# Patient Record
Sex: MALE | Race: WHITE | Employment: OTHER | URBAN - METROPOLITAN AREA
[De-identification: names, ages, dates, MRNs, and addresses within clinical notes are randomized per-mention and may not be internally consistent; named-entity substitution may affect disease eponyms.]

---

## 2017-01-03 ENCOUNTER — OFFICE VISIT (OUTPATIENT)
Dept: ENT CLINIC | Age: 67
End: 2017-01-03

## 2017-01-03 DIAGNOSIS — Z98.890 STATUS POST NASAL SURGERY: Primary | ICD-10-CM

## 2017-01-03 PROCEDURE — 99024 POSTOP FOLLOW-UP VISIT: CPT | Performed by: OTOLARYNGOLOGY

## 2017-01-06 ENCOUNTER — OFFICE VISIT (OUTPATIENT)
Dept: ENT CLINIC | Age: 67
End: 2017-01-06

## 2017-01-06 DIAGNOSIS — Z98.890 STATUS POST NASAL SURGERY: Primary | ICD-10-CM

## 2017-01-06 PROCEDURE — 99024 POSTOP FOLLOW-UP VISIT: CPT | Performed by: OTOLARYNGOLOGY

## 2017-01-10 ENCOUNTER — OFFICE VISIT (OUTPATIENT)
Dept: ENT CLINIC | Age: 67
End: 2017-01-10

## 2017-01-10 DIAGNOSIS — Z98.890 STATUS POST NASAL SURGERY: Primary | ICD-10-CM

## 2017-01-10 PROCEDURE — 99024 POSTOP FOLLOW-UP VISIT: CPT | Performed by: OTOLARYNGOLOGY

## 2017-01-27 ENCOUNTER — OFFICE VISIT (OUTPATIENT)
Dept: ENT CLINIC | Age: 67
End: 2017-01-27

## 2017-01-27 VITALS
HEIGHT: 76 IN | TEMPERATURE: 98.1 F | SYSTOLIC BLOOD PRESSURE: 104 MMHG | BODY MASS INDEX: 28.98 KG/M2 | WEIGHT: 238 LBS | DIASTOLIC BLOOD PRESSURE: 62 MMHG

## 2017-01-27 DIAGNOSIS — Z98.890 STATUS POST NASAL SURGERY: Primary | ICD-10-CM

## 2017-01-27 PROCEDURE — 99024 POSTOP FOLLOW-UP VISIT: CPT | Performed by: OTOLARYNGOLOGY

## 2017-06-16 ENCOUNTER — INITIAL CONSULT (OUTPATIENT)
Dept: SURGERY | Age: 67
End: 2017-06-16

## 2017-06-16 VITALS
TEMPERATURE: 97.4 F | DIASTOLIC BLOOD PRESSURE: 62 MMHG | WEIGHT: 248 LBS | RESPIRATION RATE: 20 BRPM | SYSTOLIC BLOOD PRESSURE: 107 MMHG | OXYGEN SATURATION: 99 % | BODY MASS INDEX: 30.2 KG/M2 | HEART RATE: 86 BPM | HEIGHT: 76 IN

## 2017-06-16 DIAGNOSIS — I87.303 CHRONIC VENOUS HYPERTENSION, BILATERAL: Primary | ICD-10-CM

## 2017-06-16 DIAGNOSIS — I87.323 CHRONIC VENOUS HYPERTENSION WITH INFLAMMATION INVOLVING BOTH SIDES: ICD-10-CM

## 2017-06-16 DIAGNOSIS — E11.8 TYPE 2 DIABETES MELLITUS WITH COMPLICATION, UNSPECIFIED LONG TERM INSULIN USE STATUS: ICD-10-CM

## 2017-06-16 DIAGNOSIS — E78.5 HYPERLIPIDEMIA, UNSPECIFIED HYPERLIPIDEMIA TYPE: ICD-10-CM

## 2017-06-16 DIAGNOSIS — I70.213 ATHEROSCLEROSIS OF NATIVE ARTERY OF BOTH LOWER EXTREMITIES WITH INTERMITTENT CLAUDICATION (HCC): ICD-10-CM

## 2017-06-16 DIAGNOSIS — I10 ESSENTIAL HYPERTENSION: ICD-10-CM

## 2017-06-16 DIAGNOSIS — I70.219 ATHEROSCLEROTIC PVD WITH INTERMITTENT CLAUDICATION (HCC): ICD-10-CM

## 2017-06-16 DIAGNOSIS — I87.2 VENOUS INSUFFICIENCY (CHRONIC) (PERIPHERAL): ICD-10-CM

## 2017-06-16 PROCEDURE — G8427 DOCREV CUR MEDS BY ELIG CLIN: HCPCS | Performed by: SURGERY

## 2017-06-16 PROCEDURE — G8417 CALC BMI ABV UP PARAM F/U: HCPCS | Performed by: SURGERY

## 2017-06-16 PROCEDURE — 99204 OFFICE O/P NEW MOD 45 MIN: CPT | Performed by: SURGERY

## 2017-06-16 PROCEDURE — 4040F PNEUMOC VAC/ADMIN/RCVD: CPT | Performed by: SURGERY

## 2017-06-16 PROCEDURE — 3017F COLORECTAL CA SCREEN DOC REV: CPT | Performed by: SURGERY

## 2017-06-16 PROCEDURE — G8598 ASA/ANTIPLAT THER USED: HCPCS | Performed by: SURGERY

## 2017-06-16 PROCEDURE — 1036F TOBACCO NON-USER: CPT | Performed by: SURGERY

## 2017-06-16 ASSESSMENT — ENCOUNTER SYMPTOMS
RESPIRATORY NEGATIVE: 1
GASTROINTESTINAL NEGATIVE: 1
ALLERGIC/IMMUNOLOGIC NEGATIVE: 1

## 2017-06-30 ENCOUNTER — PROCEDURE VISIT (OUTPATIENT)
Dept: SURGERY | Age: 67
End: 2017-06-30

## 2017-06-30 ENCOUNTER — OFFICE VISIT (OUTPATIENT)
Dept: SURGERY | Age: 67
End: 2017-06-30

## 2017-06-30 VITALS
HEIGHT: 76 IN | WEIGHT: 247 LBS | DIASTOLIC BLOOD PRESSURE: 51 MMHG | BODY MASS INDEX: 30.08 KG/M2 | HEART RATE: 67 BPM | SYSTOLIC BLOOD PRESSURE: 115 MMHG

## 2017-06-30 DIAGNOSIS — I70.213 ATHEROSCLEROSIS OF NATIVE ARTERY OF BOTH LOWER EXTREMITIES WITH INTERMITTENT CLAUDICATION (HCC): ICD-10-CM

## 2017-06-30 DIAGNOSIS — E11.8 TYPE 2 DIABETES MELLITUS WITH COMPLICATION, UNSPECIFIED LONG TERM INSULIN USE STATUS: ICD-10-CM

## 2017-06-30 DIAGNOSIS — I87.2 VENOUS INSUFFICIENCY (CHRONIC) (PERIPHERAL): ICD-10-CM

## 2017-06-30 DIAGNOSIS — I70.219 ATHEROSCLEROTIC PVD WITH INTERMITTENT CLAUDICATION (HCC): ICD-10-CM

## 2017-06-30 DIAGNOSIS — E78.5 HYPERLIPIDEMIA, UNSPECIFIED HYPERLIPIDEMIA TYPE: ICD-10-CM

## 2017-06-30 DIAGNOSIS — I87.323 CHRONIC VENOUS HYPERTENSION WITH INFLAMMATION INVOLVING BOTH SIDES: Primary | ICD-10-CM

## 2017-06-30 DIAGNOSIS — I87.323 CHRONIC VENOUS HYPERTENSION WITH INFLAMMATION INVOLVING BOTH SIDES: ICD-10-CM

## 2017-06-30 DIAGNOSIS — I87.303 CHRONIC VENOUS HYPERTENSION, BILATERAL: ICD-10-CM

## 2017-06-30 DIAGNOSIS — I10 ESSENTIAL HYPERTENSION: ICD-10-CM

## 2017-06-30 PROCEDURE — 99213 OFFICE O/P EST LOW 20 MIN: CPT | Performed by: SURGERY

## 2017-06-30 PROCEDURE — 1036F TOBACCO NON-USER: CPT | Performed by: SURGERY

## 2017-06-30 PROCEDURE — 1123F ACP DISCUSS/DSCN MKR DOCD: CPT | Performed by: SURGERY

## 2017-06-30 PROCEDURE — 93970 EXTREMITY STUDY: CPT | Performed by: SURGERY

## 2017-06-30 PROCEDURE — 4040F PNEUMOC VAC/ADMIN/RCVD: CPT | Performed by: SURGERY

## 2017-06-30 PROCEDURE — G8417 CALC BMI ABV UP PARAM F/U: HCPCS | Performed by: SURGERY

## 2017-06-30 PROCEDURE — 3017F COLORECTAL CA SCREEN DOC REV: CPT | Performed by: SURGERY

## 2017-06-30 PROCEDURE — 3046F HEMOGLOBIN A1C LEVEL >9.0%: CPT | Performed by: SURGERY

## 2017-06-30 PROCEDURE — G8427 DOCREV CUR MEDS BY ELIG CLIN: HCPCS | Performed by: SURGERY

## 2017-06-30 PROCEDURE — G8598 ASA/ANTIPLAT THER USED: HCPCS | Performed by: SURGERY

## 2017-06-30 PROCEDURE — 93925 LOWER EXTREMITY STUDY: CPT | Performed by: SURGERY

## 2017-06-30 ASSESSMENT — ENCOUNTER SYMPTOMS
RESPIRATORY NEGATIVE: 1
ALLERGIC/IMMUNOLOGIC NEGATIVE: 1
GASTROINTESTINAL NEGATIVE: 1

## 2018-07-23 ENCOUNTER — HOSPITAL ENCOUNTER (INPATIENT)
Age: 68
LOS: 1 days | Discharge: HOME HEALTH | DRG: 065 | End: 2018-07-25
Attending: EMERGENCY MEDICINE | Admitting: INTERNAL MEDICINE
Payer: MEDICARE

## 2018-07-23 ENCOUNTER — APPOINTMENT (OUTPATIENT)
Dept: CT IMAGING | Age: 68
DRG: 065 | End: 2018-07-23
Payer: MEDICARE

## 2018-07-23 DIAGNOSIS — R29.898 WEAKNESS OF HAND: ICD-10-CM

## 2018-07-23 DIAGNOSIS — I63.9 ACUTE CVA (CEREBROVASCULAR ACCIDENT) (HCC): ICD-10-CM

## 2018-07-23 DIAGNOSIS — R47.1 DYSARTHRIA: Primary | ICD-10-CM

## 2018-07-23 PROBLEM — G45.9 TIA (TRANSIENT ISCHEMIC ATTACK): Status: ACTIVE | Noted: 2018-07-23

## 2018-07-23 PROBLEM — I65.21 STENOSIS OF RIGHT CAROTID ARTERY: Status: ACTIVE | Noted: 2018-07-23

## 2018-07-23 LAB
BASE EXCESS VENOUS: 7 (ref -3–3)
BASOPHILS ABSOLUTE: 0 K/UL (ref 0–0.2)
BASOPHILS RELATIVE PERCENT: 0.6 %
CALCIUM IONIZED: 1.21 MMOL/L (ref 1.12–1.32)
CO2: 35 MMOL/L (ref 21–32)
EOSINOPHILS ABSOLUTE: 0.3 K/UL (ref 0–0.6)
EOSINOPHILS RELATIVE PERCENT: 3.7 %
GFR AFRICAN AMERICAN: >60
GFR NON-AFRICAN AMERICAN: >60
GLUCOSE BLD-MCNC: 188 MG/DL (ref 70–99)
GLUCOSE BLD-MCNC: 194 MG/DL (ref 70–99)
HCO3 VENOUS: 32.2 MMOL/L (ref 23–29)
HCT VFR BLD CALC: 41.9 % (ref 40.5–52.5)
HEMOGLOBIN: 14.2 G/DL (ref 13.5–17.5)
LACTATE: 1.25 MMOL/L (ref 0.4–2)
LYMPHOCYTES ABSOLUTE: 1.6 K/UL (ref 1–5.1)
LYMPHOCYTES RELATIVE PERCENT: 19.4 %
MCH RBC QN AUTO: 30.2 PG (ref 26–34)
MCHC RBC AUTO-ENTMCNC: 33.9 G/DL (ref 31–36)
MCV RBC AUTO: 89.2 FL (ref 80–100)
MONOCYTES ABSOLUTE: 0.7 K/UL (ref 0–1.3)
MONOCYTES RELATIVE PERCENT: 9.2 %
NEUTROPHILS ABSOLUTE: 5.4 K/UL (ref 1.7–7.7)
NEUTROPHILS RELATIVE PERCENT: 67.1 %
O2 SAT, VEN: 54 %
PCO2, VEN: 53.3 MM HG (ref 40–50)
PDW BLD-RTO: 13.4 % (ref 12.4–15.4)
PERFORMED ON: ABNORMAL
PERFORMED ON: NORMAL
PH VENOUS: 7.39 (ref 7.35–7.45)
PLATELET # BLD: 198 K/UL (ref 135–450)
PMV BLD AUTO: 8 FL (ref 5–10.5)
PO2, VEN: 29 MM HG
POC ANION GAP: 6 (ref 10–20)
POC BUN: 19 MG/DL (ref 7–18)
POC CHLORIDE: 100 MMOL/L (ref 99–110)
POC CREATININE: 1.1 MG/DL (ref 0.8–1.3)
POC POTASSIUM: 3.8 MMOL/L (ref 3.5–5.1)
POC SAMPLE TYPE: ABNORMAL
POC SAMPLE TYPE: ABNORMAL
POC SAMPLE TYPE: NORMAL
POC SODIUM: 141 MMOL/L (ref 136–145)
POC TROPONIN I: 0 NG/ML (ref 0–0.1)
RBC # BLD: 4.7 M/UL (ref 4.2–5.9)
TCO2 CALC VENOUS: 34 MMOL/L
WBC # BLD: 8 K/UL (ref 4–11)

## 2018-07-23 PROCEDURE — G0378 HOSPITAL OBSERVATION PER HR: HCPCS

## 2018-07-23 PROCEDURE — 83605 ASSAY OF LACTIC ACID: CPT

## 2018-07-23 PROCEDURE — 70450 CT HEAD/BRAIN W/O DYE: CPT

## 2018-07-23 PROCEDURE — 2580000003 HC RX 258: Performed by: INTERNAL MEDICINE

## 2018-07-23 PROCEDURE — 85025 COMPLETE CBC W/AUTO DIFF WBC: CPT

## 2018-07-23 PROCEDURE — 99285 EMERGENCY DEPT VISIT HI MDM: CPT

## 2018-07-23 PROCEDURE — 96372 THER/PROPH/DIAG INJ SC/IM: CPT

## 2018-07-23 PROCEDURE — 6360000002 HC RX W HCPCS: Performed by: INTERNAL MEDICINE

## 2018-07-23 PROCEDURE — 70498 CT ANGIOGRAPHY NECK: CPT

## 2018-07-23 PROCEDURE — 93005 ELECTROCARDIOGRAM TRACING: CPT | Performed by: EMERGENCY MEDICINE

## 2018-07-23 PROCEDURE — 84484 ASSAY OF TROPONIN QUANT: CPT

## 2018-07-23 PROCEDURE — 70496 CT ANGIOGRAPHY HEAD: CPT

## 2018-07-23 PROCEDURE — 6360000004 HC RX CONTRAST MEDICATION: Performed by: EMERGENCY MEDICINE

## 2018-07-23 PROCEDURE — 6370000000 HC RX 637 (ALT 250 FOR IP): Performed by: INTERNAL MEDICINE

## 2018-07-23 PROCEDURE — 36415 COLL VENOUS BLD VENIPUNCTURE: CPT

## 2018-07-23 PROCEDURE — 82803 BLOOD GASES ANY COMBINATION: CPT

## 2018-07-23 PROCEDURE — 80047 BASIC METABLC PNL IONIZED CA: CPT

## 2018-07-23 RX ORDER — TAMSULOSIN HYDROCHLORIDE 0.4 MG/1
0.4 CAPSULE ORAL NIGHTLY
COMMUNITY

## 2018-07-23 RX ORDER — SODIUM CHLORIDE 0.9 % (FLUSH) 0.9 %
10 SYRINGE (ML) INJECTION EVERY 12 HOURS SCHEDULED
Status: DISCONTINUED | OUTPATIENT
Start: 2018-07-23 | End: 2018-07-25 | Stop reason: HOSPADM

## 2018-07-23 RX ORDER — FLUTICASONE PROPIONATE 50 MCG
1 SPRAY, SUSPENSION (ML) NASAL DAILY
COMMUNITY

## 2018-07-23 RX ORDER — AMLODIPINE BESYLATE 5 MG/1
5 TABLET ORAL DAILY
Status: DISCONTINUED | OUTPATIENT
Start: 2018-07-24 | End: 2018-07-25 | Stop reason: HOSPADM

## 2018-07-23 RX ORDER — NAPROXEN 500 MG/1
250 TABLET ORAL 2 TIMES DAILY WITH MEALS
Status: ON HOLD | COMMUNITY
End: 2018-07-24 | Stop reason: HOSPADM

## 2018-07-23 RX ORDER — PIOGLITAZONEHYDROCHLORIDE 15 MG/1
15 TABLET ORAL DAILY
Status: DISCONTINUED | OUTPATIENT
Start: 2018-07-23 | End: 2018-07-25 | Stop reason: HOSPADM

## 2018-07-23 RX ORDER — SODIUM CHLORIDE 0.9 % (FLUSH) 0.9 %
10 SYRINGE (ML) INJECTION PRN
Status: DISCONTINUED | OUTPATIENT
Start: 2018-07-23 | End: 2018-07-25 | Stop reason: HOSPADM

## 2018-07-23 RX ORDER — LABETALOL HYDROCHLORIDE 5 MG/ML
10 INJECTION, SOLUTION INTRAVENOUS EVERY 10 MIN PRN
Status: DISCONTINUED | OUTPATIENT
Start: 2018-07-23 | End: 2018-07-25 | Stop reason: HOSPADM

## 2018-07-23 RX ORDER — BUTALBITAL, ACETAMINOPHEN AND CAFFEINE 50; 325; 40 MG/1; MG/1; MG/1
1 TABLET ORAL EVERY 4 HOURS PRN
Status: DISCONTINUED | OUTPATIENT
Start: 2018-07-23 | End: 2018-07-23

## 2018-07-23 RX ORDER — ASPIRIN 81 MG/1
81 TABLET ORAL DAILY
Status: DISCONTINUED | OUTPATIENT
Start: 2018-07-23 | End: 2018-07-25 | Stop reason: HOSPADM

## 2018-07-23 RX ORDER — HYDROCHLOROTHIAZIDE 25 MG/1
12.5 TABLET ORAL DAILY
Status: DISCONTINUED | OUTPATIENT
Start: 2018-07-24 | End: 2018-07-25 | Stop reason: HOSPADM

## 2018-07-23 RX ORDER — AZELASTINE 1 MG/ML
2 SPRAY, METERED NASAL 2 TIMES DAILY
COMMUNITY

## 2018-07-23 RX ORDER — METHOCARBAMOL 750 MG/1
750 TABLET, FILM COATED ORAL 3 TIMES DAILY
COMMUNITY
End: 2019-10-25 | Stop reason: ALTCHOICE

## 2018-07-23 RX ORDER — GUAIFENESIN 600 MG/1
1200 TABLET, EXTENDED RELEASE ORAL DAILY
COMMUNITY

## 2018-07-23 RX ORDER — LOSARTAN POTASSIUM 100 MG/1
100 TABLET ORAL DAILY
Status: DISCONTINUED | OUTPATIENT
Start: 2018-07-24 | End: 2018-07-25 | Stop reason: HOSPADM

## 2018-07-23 RX ORDER — AMLODIPINE BESYLATE 10 MG/1
10 TABLET ORAL DAILY
COMMUNITY

## 2018-07-23 RX ORDER — ALBUTEROL SULFATE 90 UG/1
1 AEROSOL, METERED RESPIRATORY (INHALATION) EVERY 4 HOURS PRN
COMMUNITY

## 2018-07-23 RX ORDER — ONDANSETRON 2 MG/ML
4 INJECTION INTRAMUSCULAR; INTRAVENOUS EVERY 6 HOURS PRN
Status: DISCONTINUED | OUTPATIENT
Start: 2018-07-23 | End: 2018-07-25 | Stop reason: HOSPADM

## 2018-07-23 RX ORDER — DIAZEPAM 2 MG/1
2 TABLET ORAL
Status: COMPLETED | OUTPATIENT
Start: 2018-07-23 | End: 2018-07-24

## 2018-07-23 RX ORDER — LORATADINE 10 MG/1
10 CAPSULE, LIQUID FILLED ORAL DAILY
COMMUNITY
End: 2019-10-25 | Stop reason: ALTCHOICE

## 2018-07-23 RX ORDER — M-VIT,TX,IRON,MINS/CALC/FOLIC 27MG-0.4MG
1 TABLET ORAL DAILY
Status: DISCONTINUED | OUTPATIENT
Start: 2018-07-23 | End: 2018-07-25 | Stop reason: HOSPADM

## 2018-07-23 RX ORDER — GLIPIZIDE 5 MG/1
5 TABLET ORAL
COMMUNITY
End: 2019-10-25 | Stop reason: ALTCHOICE

## 2018-07-23 RX ORDER — IRBESARTAN AND HYDROCHLOROTHIAZIDE 300; 12.5 MG/1; MG/1
1 TABLET, FILM COATED ORAL DAILY
Status: DISCONTINUED | OUTPATIENT
Start: 2018-07-23 | End: 2018-07-23

## 2018-07-23 RX ORDER — IRBESARTAN AND HYDROCHLOROTHIAZIDE 300; 12.5 MG/1; MG/1
1 TABLET, FILM COATED ORAL DAILY
COMMUNITY

## 2018-07-23 RX ORDER — PANTOPRAZOLE SODIUM 40 MG/1
40 TABLET, DELAYED RELEASE ORAL
Status: DISCONTINUED | OUTPATIENT
Start: 2018-07-24 | End: 2018-07-25 | Stop reason: HOSPADM

## 2018-07-23 RX ORDER — TAMSULOSIN HYDROCHLORIDE 0.4 MG/1
0.4 CAPSULE ORAL NIGHTLY
Status: DISCONTINUED | OUTPATIENT
Start: 2018-07-23 | End: 2018-07-25 | Stop reason: HOSPADM

## 2018-07-23 RX ORDER — ATORVASTATIN CALCIUM 40 MG/1
40 TABLET, FILM COATED ORAL NIGHTLY
Status: DISCONTINUED | OUTPATIENT
Start: 2018-07-23 | End: 2018-07-24

## 2018-07-23 RX ADMIN — ATORVASTATIN CALCIUM 40 MG: 40 TABLET, FILM COATED ORAL at 22:06

## 2018-07-23 RX ADMIN — TAMSULOSIN HYDROCHLORIDE 0.4 MG: 0.4 CAPSULE ORAL at 22:06

## 2018-07-23 RX ADMIN — IOPAMIDOL 80 ML: 755 INJECTION, SOLUTION INTRAVENOUS at 15:08

## 2018-07-23 RX ADMIN — MULTIPLE VITAMINS W/ MINERALS TAB 1 TABLET: TAB at 22:20

## 2018-07-23 RX ADMIN — ASPIRIN 81 MG: 81 TABLET, COATED ORAL at 22:21

## 2018-07-23 RX ADMIN — ENOXAPARIN SODIUM 40 MG: 40 INJECTION SUBCUTANEOUS at 22:06

## 2018-07-23 RX ADMIN — Medication 10 ML: at 22:21

## 2018-07-23 NOTE — ED TRIAGE NOTES
Pt reports that he has had intermittent headaches and left side weakness and slurred speech worsening over the last 3 days. Pt states that he has not felt like he was at his baseline for 10 days. Pt is alert and oriented. Skin warm and dry. Pt placed in room and RN notified of patient.

## 2018-07-23 NOTE — ED PROVIDER NOTES
file       DISPOSITION Admitted 07/23/2018 05:11:48 PM       Severo Sours, MD  Resident  07/23/18 0712

## 2018-07-24 ENCOUNTER — APPOINTMENT (OUTPATIENT)
Dept: MRI IMAGING | Age: 68
DRG: 065 | End: 2018-07-24
Payer: MEDICARE

## 2018-07-24 LAB
CHOLESTEROL, TOTAL: 139 MG/DL (ref 0–199)
GLUCOSE BLD-MCNC: 123 MG/DL (ref 70–99)
GLUCOSE BLD-MCNC: 154 MG/DL (ref 70–99)
GLUCOSE BLD-MCNC: 177 MG/DL (ref 70–99)
GLUCOSE BLD-MCNC: 200 MG/DL (ref 70–99)
GLUCOSE BLD-MCNC: 210 MG/DL (ref 70–99)
HDLC SERPL-MCNC: 27 MG/DL (ref 40–60)
LDL CHOLESTEROL CALCULATED: 65 MG/DL
LV EF: 55 %
LVEF MODALITY: NORMAL
PERFORMED ON: ABNORMAL
TRIGL SERPL-MCNC: 237 MG/DL (ref 0–150)
VLDLC SERPL CALC-MCNC: 47 MG/DL

## 2018-07-24 PROCEDURE — 36415 COLL VENOUS BLD VENIPUNCTURE: CPT

## 2018-07-24 PROCEDURE — 6370000000 HC RX 637 (ALT 250 FOR IP): Performed by: INTERNAL MEDICINE

## 2018-07-24 PROCEDURE — G8978 MOBILITY CURRENT STATUS: HCPCS

## 2018-07-24 PROCEDURE — G8987 SELF CARE CURRENT STATUS: HCPCS

## 2018-07-24 PROCEDURE — 80061 LIPID PANEL: CPT

## 2018-07-24 PROCEDURE — 6370000000 HC RX 637 (ALT 250 FOR IP): Performed by: NURSE PRACTITIONER

## 2018-07-24 PROCEDURE — 97535 SELF CARE MNGMENT TRAINING: CPT

## 2018-07-24 PROCEDURE — G8997 SWALLOW GOAL STATUS: HCPCS

## 2018-07-24 PROCEDURE — 97166 OT EVAL MOD COMPLEX 45 MIN: CPT

## 2018-07-24 PROCEDURE — 97530 THERAPEUTIC ACTIVITIES: CPT

## 2018-07-24 PROCEDURE — 6360000004 HC RX CONTRAST MEDICATION: Performed by: INTERNAL MEDICINE

## 2018-07-24 PROCEDURE — 70551 MRI BRAIN STEM W/O DYE: CPT

## 2018-07-24 PROCEDURE — G8979 MOBILITY GOAL STATUS: HCPCS

## 2018-07-24 PROCEDURE — C8929 TTE W OR WO FOL WCON,DOPPLER: HCPCS

## 2018-07-24 PROCEDURE — G8996 SWALLOW CURRENT STATUS: HCPCS

## 2018-07-24 PROCEDURE — 96372 THER/PROPH/DIAG INJ SC/IM: CPT

## 2018-07-24 PROCEDURE — 97162 PT EVAL MOD COMPLEX 30 MIN: CPT

## 2018-07-24 PROCEDURE — 97116 GAIT TRAINING THERAPY: CPT

## 2018-07-24 PROCEDURE — 2580000003 HC RX 258: Performed by: INTERNAL MEDICINE

## 2018-07-24 PROCEDURE — 92526 ORAL FUNCTION THERAPY: CPT

## 2018-07-24 PROCEDURE — G8988 SELF CARE GOAL STATUS: HCPCS

## 2018-07-24 PROCEDURE — 6360000002 HC RX W HCPCS: Performed by: INTERNAL MEDICINE

## 2018-07-24 PROCEDURE — G0378 HOSPITAL OBSERVATION PER HR: HCPCS

## 2018-07-24 PROCEDURE — 96374 THER/PROPH/DIAG INJ IV PUSH: CPT

## 2018-07-24 PROCEDURE — 92610 EVALUATE SWALLOWING FUNCTION: CPT

## 2018-07-24 RX ORDER — DIAZEPAM 5 MG/ML
5 INJECTION, SOLUTION INTRAMUSCULAR; INTRAVENOUS
Status: DISCONTINUED | OUTPATIENT
Start: 2018-07-24 | End: 2018-07-24 | Stop reason: SDUPTHER

## 2018-07-24 RX ORDER — DEXTROSE MONOHYDRATE 25 G/50ML
12.5 INJECTION, SOLUTION INTRAVENOUS PRN
Status: DISCONTINUED | OUTPATIENT
Start: 2018-07-24 | End: 2018-07-25 | Stop reason: HOSPADM

## 2018-07-24 RX ORDER — LORAZEPAM 2 MG/ML
2 INJECTION INTRAMUSCULAR
Status: DISCONTINUED | OUTPATIENT
Start: 2018-07-24 | End: 2018-07-24

## 2018-07-24 RX ORDER — NICOTINE POLACRILEX 4 MG
15 LOZENGE BUCCAL PRN
Status: DISCONTINUED | OUTPATIENT
Start: 2018-07-24 | End: 2018-07-25 | Stop reason: HOSPADM

## 2018-07-24 RX ORDER — DIAZEPAM 5 MG/1
5 TABLET ORAL
Status: COMPLETED | OUTPATIENT
Start: 2018-07-24 | End: 2018-07-24

## 2018-07-24 RX ORDER — ATORVASTATIN CALCIUM 80 MG/1
80 TABLET, FILM COATED ORAL NIGHTLY
Status: DISCONTINUED | OUTPATIENT
Start: 2018-07-24 | End: 2018-07-25 | Stop reason: HOSPADM

## 2018-07-24 RX ORDER — DEXTROSE MONOHYDRATE 50 MG/ML
100 INJECTION, SOLUTION INTRAVENOUS PRN
Status: DISCONTINUED | OUTPATIENT
Start: 2018-07-24 | End: 2018-07-25 | Stop reason: HOSPADM

## 2018-07-24 RX ORDER — ATORVASTATIN CALCIUM 80 MG/1
80 TABLET, FILM COATED ORAL NIGHTLY
Qty: 30 TABLET | Refills: 3 | Status: SHIPPED | OUTPATIENT
Start: 2018-07-24

## 2018-07-24 RX ORDER — DIAZEPAM 5 MG/ML
5 INJECTION, SOLUTION INTRAMUSCULAR; INTRAVENOUS
Status: DISCONTINUED | OUTPATIENT
Start: 2018-07-24 | End: 2018-07-24

## 2018-07-24 RX ORDER — LORAZEPAM 2 MG/ML
1 INJECTION INTRAMUSCULAR
Status: COMPLETED | OUTPATIENT
Start: 2018-07-24 | End: 2018-07-24

## 2018-07-24 RX ADMIN — INSULIN LISPRO 1 UNITS: 100 INJECTION, SOLUTION INTRAVENOUS; SUBCUTANEOUS at 00:52

## 2018-07-24 RX ADMIN — PIOGLITAZONE 15 MG: 15 TABLET ORAL at 09:57

## 2018-07-24 RX ADMIN — PANTOPRAZOLE SODIUM 40 MG: 40 TABLET, DELAYED RELEASE ORAL at 05:11

## 2018-07-24 RX ADMIN — DIAZEPAM 5 MG: 5 TABLET ORAL at 18:12

## 2018-07-24 RX ADMIN — Medication 10 ML: at 22:24

## 2018-07-24 RX ADMIN — LORAZEPAM 1 MG: 2 INJECTION INTRAMUSCULAR; INTRAVENOUS at 18:19

## 2018-07-24 RX ADMIN — INSULIN LISPRO 2 UNITS: 100 INJECTION, SOLUTION INTRAVENOUS; SUBCUTANEOUS at 18:14

## 2018-07-24 RX ADMIN — DIAZEPAM 2 MG: 2 TABLET ORAL at 07:13

## 2018-07-24 RX ADMIN — AMLODIPINE BESYLATE 5 MG: 5 TABLET ORAL at 09:51

## 2018-07-24 RX ADMIN — INSULIN LISPRO 2 UNITS: 100 INJECTION, SOLUTION INTRAVENOUS; SUBCUTANEOUS at 15:10

## 2018-07-24 RX ADMIN — ENOXAPARIN SODIUM 40 MG: 40 INJECTION SUBCUTANEOUS at 09:50

## 2018-07-24 RX ADMIN — HYDROCHLOROTHIAZIDE 12.5 MG: 25 TABLET ORAL at 09:50

## 2018-07-24 RX ADMIN — TAMSULOSIN HYDROCHLORIDE 0.4 MG: 0.4 CAPSULE ORAL at 22:23

## 2018-07-24 RX ADMIN — INSULIN LISPRO 1 UNITS: 100 INJECTION, SOLUTION INTRAVENOUS; SUBCUTANEOUS at 22:26

## 2018-07-24 RX ADMIN — ASPIRIN 81 MG: 81 TABLET, COATED ORAL at 09:51

## 2018-07-24 RX ADMIN — Medication 10 ML: at 09:53

## 2018-07-24 RX ADMIN — LOSARTAN POTASSIUM 100 MG: 100 TABLET ORAL at 09:50

## 2018-07-24 RX ADMIN — ATORVASTATIN CALCIUM 80 MG: 80 TABLET, FILM COATED ORAL at 22:23

## 2018-07-24 RX ADMIN — PERFLUTREN 2.2 MG: 6.52 INJECTION, SUSPENSION INTRAVENOUS at 14:50

## 2018-07-24 RX ADMIN — MULTIPLE VITAMINS W/ MINERALS TAB 1 TABLET: TAB at 09:51

## 2018-07-24 ASSESSMENT — PAIN SCALES - GENERAL
PAINLEVEL_OUTOF10: 0

## 2018-07-24 NOTE — PROGRESS NOTES
Staff from MRI called and stated pt is not relaxed enough to take MRI. Hospitalist on call was notified via [employer]. Awaiting response.

## 2018-07-24 NOTE — PROGRESS NOTES
Caregiver Present: Yes (wife and sister in law)  Diagnosis: TIA vs CVA  Subjective  Subjective: pt in bed, agreeable to eval. States he has been having difficulty speaking, R UE/LE weakness has been improving. states he had blurred vision but it has since resolved  Pain Assessment  Patient Currently in Pain: No    Social/Functional History  Social/Functional History  Lives With: Spouse  Type of Home: House  Home Layout: Two level, Laundry in basement, Able to Live on Main level with bedroom/bathroom  Home Access: Stairs to enter without rails (3 )  Bathroom Shower/Tub: Tub/Shower unit  Bathroom Toilet: Standard  Home Equipment:  (able to borrow cane)  ADL Assistance: Independent  Homemaking Assistance: Independent (pt does laundry; wife cooks; shares cleaning with wife)  Ambulation Assistance: Independent  Transfer Assistance: Independent  Active : Yes  Occupation: Retired  Type of occupation: payroll  Leisure & Hobbies: computer, golf  IADL Comments: babysits his 4 grandchildren daily (youngest is 6 months old)  Additional Comments: just prior to admission pt reports 6 near falls due to new R sided weakness, dizziness, veers to right       Objective   Vision: Impaired  Vision Exceptions: Wears glasses for reading  Hearing: Within functional limits    Orientation  Overall Orientation Status: Within Normal Limits     Balance  Sitting Balance: Independent  Standing Balance: Contact guard assistance (-sba)  Standing Balance  Time: 5 min  Activity: bathroom mobility, toileting, grooming  Sit to stand: Stand by assistance  Stand to sit: Stand by assistance  Functional Mobility  Functional - Mobility Device: No device  Activity: To/from bathroom  Assist Level: Stand by assistance  Functional Mobility Comments: had one LOB when approaching sink for ADLs which pt able to self correct with cga; noted decreased proprioception R LE    Toilet Transfers  Toilet - Technique: Ambulating  Equipment Used: Standard toilet (using grab bar)  Toilet Transfer: Stand by assistance (decreased eccentric control R LE during stand>Sit using grab bar for support)  Toilet Transfers Comments: has counter support by toilet at home    ADL  Grooming: Modified independent  (brush teeth, wash hands with increased time to open/manipulate ADL containers with R UE)  UE Dressing: Independent (change gown)  LE Dressing: Stand by assistance (don shorts, socks (sba for balance))  Toileting: Supervision (stance at commode to urinate, clothing mgmt)  Tone RUE  RUE Tone: Normotonic  Tone LUE  LUE Tone: Normotonic    Coordination  Movements Are Fluid And Coordinated: No  Coordination and Movement description: Fine motor impairments;Gross motor impairments;Right UE  Quality of Movement Other  Comment: impaired finger to nose testing, thumb to digit opposition R UE compared to L; decreased R UE proprioception     Bed mobility  Supine to Sit: Independent  Transfers  Sit to stand: Stand by assistance  Stand to sit: Stand by assistance     Vision - Basic Assessment  Visual History: No significant visual history  Patient Visual Report: No visual complaint reported. (able to read sign across room without difficulty)  Oculo Motor Control: WNL  Cognition  Overall Cognitive Status: WNL        Sensation  Overall Sensation Status: Impaired (neuropathy both feet; WNL BUEs)        LUE AROM (degrees)  LUE AROM : WNL  RUE AROM (degrees)  RUE AROM : WNL  LUE Strength  Gross LUE Strength: WNL (5/5)  L Hand Grasp: 5/5  RUE Strength  Gross RUE Strength: WFL (4/5)  R Hand Grasp: 4/5     Hand Dominance  Hand Dominance: Left         Treatment consisted of:  pt education on safe transfers, fall precautions, activity promotion, modified ADLs to increase indpendence/safety  Educated pt and wife on safe tub transfers with wife supervision, DME recommendations.  Discussed functional activities to address coordination and strength in R UE    Assessment   Performance deficits / Impairments: Decreased functional mobility ; Decreased ADL status; Decreased balance;Decreased coordination;Decreased fine motor control;Decreased strength  Assessment: pt presents with mild deficits in strength and coordination in R UE/LE requiring cga-sba for dynamic standing balance during ADLS and mobility and increased time to complete ADLs 2/2 R UE deficits. Pt's wife able to provide 24hr assist at home. pt would benefit from ongoing OT tx on outpatient basis to further address his deficits  Treatment Diagnosis: decreased ADLs, functional mobility, R sided motor control and strength  Prognosis: Good  Decision Making: Medium Complexity  Patient Education: OT role, d/c rec, functional use of R UE, activity promotion, DME rec- verb understanding  REQUIRES OT FOLLOW UP: Yes  Activity Tolerance  Activity Tolerance: Patient Tolerated treatment well  Safety Devices  Safety Devices in place: Yes (recommend assist of 1 and gait belt for safe pt mobility)  Type of devices: Nurse notified; Left in chair;Call light within reach; Chair alarm in place (wife present)         Plan   Plan  Times per week: 5-7x  Times per day: Daily  Current Treatment Recommendations: Strengthening, Balance Training, Functional Mobility Training, Safety Education & Training, Self-Care / ADL, Neuromuscular Re-education    G-Code  OT G-codes  Functional Limitation: Self care  Self Care Current Status (): At least 20 percent but less than 40 percent impaired, limited or restricted  Self Care Goal Status ():  At least 1 percent but less than 20 percent impaired, limited or restricted  OutComes Score                                           AM-PAC Score        AM-[facility] Inpatient Daily Activity Raw Score: 21  AM-PAC Inpatient ADL T-Scale Score : 44.27  ADL Inpatient CMS 0-100% Score: 32.79  ADL Inpatient CMS G-Code Modifier : CJ    Goals  Short term goals  Time Frame for Short term goals: by discharge  Short term goal 1: supervision standing balance during

## 2018-07-24 NOTE — PLAN OF CARE
Problem: Nutrition  Intervention: Swallowing evaluation  Intervention: Speech Evaluation/treatment  SLP completed evaluation. Please refer to notes in EMR.

## 2018-07-24 NOTE — PROGRESS NOTES
respiratory effort. Clear to auscultation, bilaterally without Rales/Wheezes/Rhonchi. Cardiovascular: Regular rate and rhythm with normal S1/S2 without murmurs, rubs or gallops. Abdomen: Soft, non-tender, non-distended with normal bowel sounds. Musculoskeletal: Notable for subtle weakness in the right arm and right leg. Minimal pronator drift, right greater than left. Mild right-sided facial droop, barely noticeable. Skin: Skin color, texture, turgor normal.  No rashes or lesions. Neurologic:  Neurovascularly intact without any focal sensory/motor deficits. Cranial nerves: II-XII intact, grossly non-focal.  Psychiatric: Alert and oriented, thought content appropriate, normal insight  Capillary Refill: Brisk,< 3 seconds   Peripheral Pulses: +2 palpable, equal bilaterally       Labs:   Recent Labs      07/23/18   1421   WBC  8.0   HGB  14.2   HCT  41.9   PLT  198     Recent Labs      07/23/18   1405   CO2  35*   CREATININE  1.1     No results for input(s): AST, ALT, BILIDIR, BILITOT, ALKPHOS in the last 72 hours. No results for input(s): INR in the last 72 hours. Recent Labs      07/23/18   1403   TROPONINI  0.00       Urinalysis:    No results found for: Delmon Samra, BACTERIA, RBCUA, BLOODU, SPECGRAV, GLUCOSEU    Radiology:  CTA HEAD W CONTRAST   Final Result      CTA NECK W CONTRAST   Final Result      CT HEAD WO CONTRAST   Final Result      MRI BRAIN WO CONTRAST    (Results Pending)           Assessment/Plan:    Active Hospital Problems    Diagnosis    Acute CVA (cerebrovascular accident) ([facility] [address] [I63.9]     Priority: High    Type 2 diabetes mellitus ([facility] [address] [E11.9]     Priority: High    Hyperlipidemia [E78.5]     Priority: High    Stenosis of right carotid artery [I65.21]    Hypertension [I10]        Continue aspirin, statin. Continue neuro checks. Echo Results reviewed and discussed with patient. MRI brain pending. We will attempt to increase Valium dose.   Appreciate neurology

## 2018-07-24 NOTE — PROGRESS NOTES
Pt will negotiate 3 steps with CGA  Patient Goals   Patient goals : return home       Therapy Time   Individual Concurrent Group Co-treatment   Time In 4899         Time Out 1243         Minutes 40                 Timed Code Treatment Minutes:  25    Total Treatment Minutes:  40    If patient is discharged prior to next treatment, this note will serve as the discharge summary.   Jean-Claude Dangelo, PT, DPT  [phone]

## 2018-07-24 NOTE — CONSULTS
and mild (less than 30% diameter) stenosis on the left. Assessment: 70year old man with PMH HTN, HLD, Type II who presented with right arm and leg weakness for three days. Plan:  -Obtain MRI of the brain wo contrast to eval for stroke  -CTA head and neck with some mod-severe ICA stenosis on the right  -Echocardiogram with bubble  -Nursing bedside swallow before PO   -ASA 81mg PO daily  -Atorvastatin 80mg PO QHS  -SCDs for DVT prophylaxis  -PT/OT: eval and treat, PMR consult if rehab appropriate   -ST: eval and treat and dysphagia screen  -Allow BP to autoregulate for first 24 hours after stroke and treat BP >220/110 with labetalol   -Q4H neuro checks  -Q4H vital signs  -Check lipid panel and hemoglobin A1C  -Telemetry     tPA given: No, out of time window  DVT prophylaxis: SQ lovenox   Dysphagia Screening: Ordered  PT/OT Rehab: Ordered  Smoking Cessation Counseling: N/A  Stroke Education: Yes  Antithrombotic by end of day 2: 81mg ASA PO daily   Atherosclerotic Stroke or TIA: LDL goal < 70mg/dl or 50% reduction in LDL from baseline.   Symptomatic atherosclerotic cardiovascular disease and ? 73yo: high-intensity statin 80mg atorvastatin PO nightly       RONDA Hou-CNP  Neurology  [phone]

## 2018-07-24 NOTE — H&P
smokeless tobacco.  ETOH:   reports that he drinks alcohol. Family History:      Reviewed in detail and Positive as follows:    Family History   Problem Relation Age of Onset    Heart Disease Father     High Blood Pressure Father     Depression Mother     Diabetes Mother        REVIEW OF SYSTEMS:   10 point ROS obtained. Pertinent positives as noted in the HPI. All other systems reviewed and negative. PHYSICAL EXAM:    BP (!) 177/73   Pulse 69   Temp 98.6 °F (37 °C) (Oral)   Resp 18   Ht 6' 4\" (1.93 m)   Wt 243 lb 13.3 oz (110.6 kg)   SpO2 95%   BMI 29.68 kg/m²       General appearance:  No apparent distress, appears stated age and cooperative. HEENT:  Normal cephalic, atraumatic without obvious deformity. Pupils equal, round, and reactive to light. Extra ocular muscles intact. Conjunctivae/corneas clear. Neck: Supple, with full range of motion. No jugular venous distention. Trachea midline. Respiratory:  Normal respiratory effort. Clear to auscultation, bilaterally without Rales/Wheezes/Rhonchi. Cardiovascular:  Regular rate and rhythm with normal S1/S2 without murmurs, rubs or gallops. Abdomen: Soft, non-tender, non-distended with normal bowel sounds. Musculoskeletal:  No clubbing, cyanosis or edema bilaterally. Full range of motion without deformity. Skin: Skin color, texture, turgor normal.  No rashes or lesions. Neurologic:  Notable for very subtle weakness in the right arm and right leg. Minimal pronator drift, right greater than left. Mild right-sided facial droop, barely noticeable.   Psychiatric:  Alert and oriented, thought content appropriate, normal insight  Capillary Refill: Brisk,< 3 seconds   Peripheral Pulses: +2 palpable, equal bilaterally       Labs:     Recent Labs      07/23/18   1421   WBC  8.0   HGB  14.2   HCT  41.9   PLT  198     Recent Labs      07/23/18   1405   CO2  35*   CREATININE  1.1     No results for input(s): AST, ALT, BILIDIR, BILITOT, ALKPHOS in the

## 2018-07-24 NOTE — PROGRESS NOTES
reach.       Doris Qiu, [place], [facility]- [address] [facility]  Pg # [phone]  This document will serve as a discharge summary if pt discharge before next treatment   session

## 2018-07-24 NOTE — PLAN OF CARE
Problem: Neurological  Intervention: OT Evaluation/treatment  Pt will increase independence with functional transfers and ADLs.

## 2018-07-25 ENCOUNTER — APPOINTMENT (OUTPATIENT)
Dept: GENERAL RADIOLOGY | Age: 68
DRG: 065 | End: 2018-07-25
Payer: MEDICARE

## 2018-07-25 VITALS
OXYGEN SATURATION: 96 % | TEMPERATURE: 98.5 F | BODY MASS INDEX: 29.69 KG/M2 | DIASTOLIC BLOOD PRESSURE: 77 MMHG | HEART RATE: 71 BPM | RESPIRATION RATE: 18 BRPM | WEIGHT: 243.83 LBS | SYSTOLIC BLOOD PRESSURE: 153 MMHG | HEIGHT: 76 IN

## 2018-07-25 PROBLEM — I63.9 STROKE OF UNKNOWN ETIOLOGY (HCC): Status: ACTIVE | Noted: 2018-07-25

## 2018-07-25 LAB
GLUCOSE BLD-MCNC: 131 MG/DL (ref 70–99)
GLUCOSE BLD-MCNC: 176 MG/DL (ref 70–99)
PERFORMED ON: ABNORMAL
PERFORMED ON: ABNORMAL

## 2018-07-25 PROCEDURE — 6370000000 HC RX 637 (ALT 250 FOR IP): Performed by: INTERNAL MEDICINE

## 2018-07-25 PROCEDURE — 97116 GAIT TRAINING THERAPY: CPT

## 2018-07-25 PROCEDURE — 97535 SELF CARE MNGMENT TRAINING: CPT

## 2018-07-25 PROCEDURE — 97530 THERAPEUTIC ACTIVITIES: CPT

## 2018-07-25 PROCEDURE — 92611 MOTION FLUOROSCOPY/SWALLOW: CPT

## 2018-07-25 PROCEDURE — 92526 ORAL FUNCTION THERAPY: CPT

## 2018-07-25 PROCEDURE — 97110 THERAPEUTIC EXERCISES: CPT

## 2018-07-25 PROCEDURE — 96372 THER/PROPH/DIAG INJ SC/IM: CPT

## 2018-07-25 PROCEDURE — 2580000003 HC RX 258: Performed by: INTERNAL MEDICINE

## 2018-07-25 PROCEDURE — 74230 X-RAY XM SWLNG FUNCJ C+: CPT

## 2018-07-25 PROCEDURE — G0378 HOSPITAL OBSERVATION PER HR: HCPCS

## 2018-07-25 PROCEDURE — G8997 SWALLOW GOAL STATUS: HCPCS

## 2018-07-25 PROCEDURE — 6360000002 HC RX W HCPCS: Performed by: INTERNAL MEDICINE

## 2018-07-25 PROCEDURE — G8998 SWALLOW D/C STATUS: HCPCS

## 2018-07-25 PROCEDURE — G8996 SWALLOW CURRENT STATUS: HCPCS

## 2018-07-25 PROCEDURE — 1200000000 HC SEMI PRIVATE

## 2018-07-25 RX ADMIN — ENOXAPARIN SODIUM 40 MG: 40 INJECTION SUBCUTANEOUS at 08:59

## 2018-07-25 RX ADMIN — INSULIN LISPRO 1 UNITS: 100 INJECTION, SOLUTION INTRAVENOUS; SUBCUTANEOUS at 13:23

## 2018-07-25 RX ADMIN — PANTOPRAZOLE SODIUM 40 MG: 40 TABLET, DELAYED RELEASE ORAL at 05:59

## 2018-07-25 RX ADMIN — LOSARTAN POTASSIUM 100 MG: 100 TABLET ORAL at 09:00

## 2018-07-25 RX ADMIN — PIOGLITAZONE 15 MG: 15 TABLET ORAL at 08:59

## 2018-07-25 RX ADMIN — AMLODIPINE BESYLATE 5 MG: 5 TABLET ORAL at 09:04

## 2018-07-25 RX ADMIN — Medication 10 ML: at 09:00

## 2018-07-25 RX ADMIN — MULTIPLE VITAMINS W/ MINERALS TAB 1 TABLET: TAB at 09:00

## 2018-07-25 RX ADMIN — ASPIRIN 81 MG: 81 TABLET, COATED ORAL at 09:00

## 2018-07-25 RX ADMIN — HYDROCHLOROTHIAZIDE 12.5 MG: 25 TABLET ORAL at 08:59

## 2018-07-25 ASSESSMENT — PAIN SCALES - GENERAL
PAINLEVEL_OUTOF10: 0

## 2018-07-25 NOTE — PROGRESS NOTES
Spoke with therapist who recommends regular diet with thin liquids. Dr. Regla Bermudez was notified via [employer].

## 2018-07-25 NOTE — PROCEDURES
INSTRUMENTAL SWALLOW REPORT  MODIFIED BARIUM SWALLOW    NAME: August Mason   : 1950  MRN: [id number]       Date of Eval: 2018     Ordering Physician: Dr. Joseph Waldron  Radiologist: Dr. Dino Dao     Referring Diagnosis(es): Referring Diagnosis: CVA    Past Medical History:  has a past medical history of Asthma; BPH; Cataract; Depression; GERD (gastroesophageal reflux disease); Hyperlipidemia; Hypertension; and Type 2 diabetes mellitus ([facility] [address] Past Surgical History:  has a past surgical history that includes Lumbar disc surgery; Nasal septum surgery; Spine surgery (); knee surgery (); eye surgery; Colonoscopy; and other surgical history (N/A, 2016). Current Diet Solid Consistency: Regular  Current Diet Liquid Consistency: Thin    Type of Study: Initial MBS     MRI Brain (18)  1. Small patchy acute ischemic insults in the left ventral melba. No associated hemorrhage. 2. Tiny remote lacunar infarct in the left corona radiata. 3. Mild chronic small vessel ischemic white matter disease.        CT of head 18  1.  No discrete findings for acute intracranial abnormality if   there is concern for stroke, follow-up study or further evaluation   with MRI imaging recommended.       2.  Subtle low-attenuation likely reflecting remote ischemic   change with small remote lacunar infarct in the inferior left   basal ganglia region.       2.  Mild bilateral ethmoid and sphenoid sinus disease as   described. Recent CXR/CT of Chest: none    Patient Complaints/Reason for Referral:  August Mason was referred for a MBS to assess the efficiency of his/her swallow function, assess for aspiration, and to make recommendations regarding safe dietary consistencies, effective compensatory strategies, and safe eating environment.   Patient complaints: throat clearing with PO and without PO    Onset of problem: 18    Behavior/Cognition/Vision/Hearing:  Behavior/Cognition: Alert; Cooperative;Pleasant mood  Vision: Impaired  Vision Exceptions: Wears glasses for reading  Hearing: Within functional limits    Impressions:   Pt demonstrates adequate oral stage of swallow. Pt demonstrates transient penetration with thin liquids (via cup and via straw) that clears with the swallow. He has no pharyngeal or laryngeal residue. There is no penetration with nectar consistency liquids. Pt deemed appropriate for regular diet with thin liquids with adherence to General Aspiration Precautions (Upright position for all PO, Eat slowly, Small bites/sips). Treatment Dx and ICD 10: oropharyngeal dysphagia (R13.12)  Patient Position: Lateral and Patient Degrees: 90    Consistencies Administered: Thin cup; Thin straw;Nectar cup;Puree; Soft solid    Compensatory Swallowing Strategies Attempted: Upright as possible for all oral intake;Small bites/sips;Eat/Feed slowly  Oral: WFL  Pharyngeal: Transient penetration with thin liquids via cup and via straw, clears completely with the swallow. Dysphagia Outcome Severity Scale: Level 6: Within functional limits/Modified independence  Penetration-Aspiration Scale (PAS): 2 - Material enters the airway, remains above the vocal folds, and is ejected from the airway    Recommended Diet:  Solid consistency: Regular  Liquid consistency: Thin  Medication administration: PO    Safe Swallow Protocol:  Upright as possible for all oral intake  Small bites/sips  Eat/Feed slowly    Recommendations/Treatment  Requires SLP Intervention: Yes  D/C Recommendations: Outpatient tx is indicated    Recommended Exercises:   Therapeutic Interventions: Patient/Family education    Referral To: Speech Evaluation    Education: Images and recommendations were reviewed with yudithtient following this exam.   Patient Education: SLP educated pt re: MBS procedure, assessment results, and diet recommendations.   Patient Education Response: Verbalizes understanding    Prognosis  Prognosis for safe

## 2018-07-25 NOTE — PROGRESS NOTES
Occupational Therapy  Facility/Department: [place] [address] 4 PCU  Daily Treatment Note  NAME: Armando Bronson  : 1950  MRN: [id number]    Date of Service: 2018    Discharge Recommendations:  Armando Bronson scored a 21/24 on the AM-PAC ADL Inpatient form. Current research shows that an AM-PAC score of 18 or greater is typically associated with a discharge to the patient's home setting. Based on the patients AM-PAC score and their current ADL deficits, it is recommended that the patient have 2-3 sessions per week of Occupational Therapy at d/c to increase the patients independence. HOME HEALTH CARE: LEVEL 3 SAFETY     - Initial home health evaluation to occur within 24-48 hours, in patient home   - Therapy evaluations in home within 24-48 hours of discharge; including DME and home safety   - Frontload therapy 5 days, then 3x a week   - Therapy to evaluate if patient has [address] needs for personal care   -  evaluation within 24-48 hours, includes evaluation of resources and insurance to determine [place], [place], LTC, and Medicaid options        OT Equipment Recommendations  Equipment Needed: Yes  Mobility Devices: ADL Assistive Devices  ADL Assistive Devices: Shower Chair with back; Toileting - Raised Toilet Seat with arms    Patient Diagnosis(es): The primary encounter diagnosis was Dysarthria. Diagnoses of Weakness of hand and Acute CVA (cerebrovascular accident) [facility]) were also pertinent to this visit. has a past medical history of Asthma; BPH; Cataract; Depression; GERD (gastroesophageal reflux disease); Hyperlipidemia; Hypertension; and Type 2 diabetes mellitus ([facility] [address] has a past surgical history that includes Lumbar disc surgery; Nasal septum surgery; Spine surgery (); knee surgery (); eye surgery; Colonoscopy; and other surgical history (N/A, 2016).     Restrictions  Position Activity Restriction  Other position/activity restrictions: no activity restrictions technique)  Transfers  Sit to stand: Contact guard assistance  Stand to sit: Stand by assistance        Coordination  Fine Motor: provided pt with red sponge for /pinch strengthening and in hand manipulation HEP. education provided on bilateral slow AROM of B UE with focus on motor control of R UE. discussed cooridination activities for pt to complete at home           Assessment   Performance deficits / Impairments: Decreased functional mobility ; Decreased ADL status; Decreased balance;Decreased coordination;Decreased fine motor control;Decreased strength  Assessment: pt required cga for safe transfers and mobility using RW this date- limited by balance deficits and R LE weakness. pt receptive to education on HEP for R UE strengthening and DME recommendations. pt would benefit  from ongoing OT tx to maximize functional return of R UE strength/coordination. wife plans to provide 24hr assist at home, agreeable to home therapy- pt would benefit from home safety eval  Treatment Diagnosis: decreased ADLs, functional mobility, R sided motor control and strength  Prognosis: Good  Patient Education: R UE HEP, safe mobility using RW, DME rec- verb understanding  REQUIRES OT FOLLOW UP: Yes  Activity Tolerance  Activity Tolerance: Patient Tolerated treatment well  Safety Devices  Safety Devices in place: Yes (recommend RW, gait belt and assist of 1 for safe mobility- white board updated)  Type of devices: Nurse notified;Call light within reach; Bed alarm in place; Left in bed (wife present)          Plan   Plan  Times per week: 5-7x  Times per day: Daily  Current Treatment Recommendations: Strengthening, Balance Training, Functional Mobility Training, Safety Education & Training, Self-Care / ADL, Neuromuscular Re-education  G-Code     OutComes Score                                           AM-PAC Score        AM-PAC Inpatient Daily Activity Raw Score: 21  AM-PAC Inpatient ADL T-Scale Score : 44.27  ADL Inpatient CMS 0-100% Score: 32.79  ADL Inpatient CMS G-Code Modifier : CJ    Goals  Short term goals  Time Frame for Short term goals: by discharge  Short term goal 1: supervision standing balance during ADL/simple IADL task- not met  Short term goal 2: independent HEP for R UE strengthening/coordination- goal met, ongoing  Short term goal 3: SBA simulated tub transfer- not met  Patient Goals   Patient goals : return home, agreeable to home therapy       Therapy Time   Individual Concurrent Group Co-treatment   Time In 1445         Time Out 1530         Minutes 45         Timed Code Treatment Minutes:   45  Total Treatment Minutes:  39     If patient is d/c prior to next treatment session, this note will serve as the discharge summary    Tanja Ornelas, OT

## 2018-07-25 NOTE — PROGRESS NOTES
Uneventfully removed saline lock, site is unremarkable. Bandage to site. Reviewed discharge instructions with pt and his wife, who verbalized understanding and willingness to comply. Patient will have therapy and front wheeled walker is at pt's bedside. Pt is aware that prescriptions are at their outpatient pharmacy.

## 2018-07-25 NOTE — PROGRESS NOTES
Speech Language Pathology  Facility/Department: [place] [address] PCU  Dysphagia Daily Treatment Note    NAME: Morteza Kuhn  : 1950  MRN: [id number]    Patient Diagnosis(es):   Patient Active Problem List    Diagnosis Date Noted    Type 2 diabetes mellitus ([facility] 2010     Priority: High    Hyperlipidemia 2010     Priority: High    Stroke of unknown [facility] ([facility] 2018    Acute CVA (cerebrovascular accident) ([facility] 2018    Stenosis of right carotid artery 2018    Atherosclerotic PVD with intermittent claudication ([facility] 2017    Chronic venous hypertension with inflammation involving both sides 2017    Nasal obstruction     Hypertrophy of nasal turbinates     Memory difficulties 2012    Asthma 2011    Neuropathy in diabetes ([facility] 2011    Hypertension 2011    Depression 2010    Allergic rhinitis 2010    BPH (benign prostatic hyperplasia) 2010     Allergies: Allergies   Allergen Reactions    Latex Rash    Amoxicillin Nausea Only     Light headed    Amoxil [Amoxicillin Trihydrate]      rash    Adhesive Tape Rash    Cefuroxime Rash     Onset Date: 18    MRI Brain (18)  1. Small patchy acute ischemic insults in the left ventral melba. No associated hemorrhage. 2. Tiny remote lacunar infarct in the left corona radiata. 3. Mild chronic small vessel ischemic white matter disease.      CT of head 18  1.  No discrete findings for acute intracranial abnormality if   there is concern for stroke, follow-up study or further evaluation   with MRI imaging recommended.       2.  Subtle low-attenuation likely reflecting remote ischemic   change with small remote lacunar infarct in the inferior left   basal ganglia region.       2.  Mild bilateral ethmoid and sphenoid sinus disease as   described.      Pain:  Pain Assessment  Patient Currently in Pain: Denies  Pain Assessment: 0-10  Pain Level: 0    Chart reviewed. Medical Diagnosis:  TIA  Treatment Diagnosis:  Oral-pharyngeal Dysphagia (R13.12)    BSE Impression (7/24/18): Pt reports onset of difficulty with talking and walking 2-3 days ago. When asked pt if he was having any trouble swallowing, pt stated \"sometimes it doesn't feel right\". Lungs are clear per chart. Pt presents with mild dysarthria, but pt utilizing strategies to aid with intelligibility. Oral- pt with very slight right labial droop at rest. [facility]. Pharyngeal- pt with frequent throat clearing following all consistencies, which pt and wife state is his baseline. Pt able to initiate swallow in a timely manner. Voice clear after all consistencies- no coughing, only throat clearing, following all consistencies. MBS results: none    Current Diet:  Regular Texture, Thin Liquids    Treatment:  Pt seen bedside to address the following goals:  1- The patient will tolerate recommended diet without observed clinical signs of aspiration  7/25/18:  Pt demonstrates immediate and delayed throat clearing with thin liquids. Pt requires increased time and added moisture to facilitate swallow of dry cracker. (Pt also demonstrates difficulty with oral management of the eucharistic host provided by .)  Pt and wife report frequent throat clearing is typical for patient due to sinus congestion and drainage; however on this date, clinical s/s aspiration and dysphagia appear to increase in frequency and severity with PO. Recommend instrumental swallow assessment to r/o aspiration. Continue goal.    2- The pt/family will demonstrate understanding of swallowing recommendations and concerns. 7/24-  The pt and wife were educated to purpose of the visit, anatomy and physiology of the swallow, concerns for aspiration, swallowing strategies, diet recommendations and possibility of liquids being changed to nectar thick if s/s aspiration emerge. The pt stated comprehension.  con't goal  7/25/18:  SLP educated pt and wife re: role of SLP, anatomy and physiology of swallow, s/s aspiration, risks of aspiration, concern for dysphagia with a brainstem infarct, recommendation for MBS procedure. Pt and wfie verbalized comprehension. Continue goal.    In addition, pt continues to demonstrate mild dysarthria with intelligible speech at the conversation level. Recommend complete speech evaluation. Patient/Family/Caregiver Education:  see above    Compensatory Strategies:  Upright as possible for all oral intake  Small sips as a precaution      Plan:    Instrumental assessment (MBS)  Continued dysphagia treatment with goals per plan of care. Speech evaluation (to be completed at the next level of care if pt discharges today)  Diet recommendations:  TBD based on MBS  DC recommendation: pt will likely need continued Speech Therapy for speech and swallowing   Treatment: 15 minutes  D/W nursing, Dereje Dahl  Needs met prior to leaving room, call button in reach. Electronically Signed by:  Frandy Don., [id number] [facility], [place], [facility]  Speech-Language Pathologist  [place]. [address]  Pager #[phone]    If patient is discharged prior to next treatment, this note will serve as the discharge summary.

## 2018-07-25 NOTE — CARE COORDINATION
[facility]    Spoke with patient regarding [facility] services. Patient aware and agreeable to services. Faxed orders to [facility].     Rosina Chisholm LPN  Care Transition Nurse  [address]  [phone]

## 2018-07-25 NOTE — PROGRESS NOTES
Interval History:  No acute events overnight.      Current Medications:    Current Facility-Administered Medications:     insulin lispro (HUMALOG) injection pen 0-6 Units, 0-6 Units, Subcutaneous, TID WC, Lianet Keenan MD, 2 Units at 07/24/18 1814    insulin lispro (HUMALOG) injection pen 0-3 Units, 0-3 Units, Subcutaneous, Nightly, Lianet Keenan MD, 1 Units at 07/24/18 2226    glucose (GLUTOSE) 40 % oral gel 15 g, 15 g, Oral, PRN, Lianet Keenan MD    dextrose 50 % solution 12.5 g, 12.5 g, Intravenous, PRN, Lianet Keenan MD    glucagon (rDNA) injection 1 mg, 1 mg, Intramuscular, PRN, Lianet Keenan MD    dextrose 5 % solution, 100 mL/hr, Intravenous, PRN, Lianet Keenan MD    atorvastatin (LIPITOR) tablet 80 mg, 80 mg, Oral, Nightly, Rk Rodriguez, APRN - CNP, 80 mg at 07/24/18 2223    aspirin EC tablet 81 mg, 81 mg, Oral, Daily, Sumanth Murcia MD, 81 mg at 07/24/18 0951    pioglitazone (ACTOS) tablet 15 mg, 15 mg, Oral, Daily, Sumanth Murcia MD, 15 mg at 07/24/18 0957    amLODIPine (NORVASC) tablet 5 mg, 5 mg, Oral, Daily, Sumanth Murcia MD, 5 mg at 07/24/18 0951    tamsulosin (FLOMAX) capsule 0.4 mg, 0.4 mg, Oral, Nightly, Sumanth Murcia MD, 0.4 mg at 07/24/18 2223    pantoprazole (PROTONIX) tablet 40 mg, 40 mg, Oral, QAM AC, Sumanth Murcia MD, 40 mg at 07/25/18 0559    therapeutic multivitamin-minerals 1 tablet, 1 tablet, Oral, Daily, Sumanth Murcia MD, 1 tablet at 07/24/18 0951    sodium chloride flush 0.9 % injection 10 mL, 10 mL, Intravenous, 2 times per day, Sumanth Murcia MD, 10 mL at 07/24/18 2224    sodium chloride flush 0.9 % injection 10 mL, 10 mL, Intravenous, PRN, Sumanth Murcia MD    magnesium hydroxide (MILK OF MAGNESIA) 400 MG/5ML suspension 30 mL, 30 mL, Oral, Daily PRN, Sumanth Murcia MD    ondansetron (ZOFRAN) injection 4 mg, 4 mg, Intravenous, Q6H PRN, Sumanth Murcia MD    enoxaparin (LOVENOX) injection 40 mg, 40 mg, Subcutaneous, Daily, Rosita Arizmendi MD, 40 mg at 07/24/18 2880   labetalol (NORMODYNE;TRANDATE) injection 10 mg, 10 mg, Intravenous, Q10 Min PRN, Sumanth Murcia MD    losartan (COZAAR) tablet 100 mg, 100 mg, Oral, Daily, 100 mg at 07/24/18 0950 **AND** hydrochlorothiazide (HYDRODIURIL) tablet 12.5 mg, 12.5 mg, Oral, Daily, Sumanth Murcia MD, 12.5 mg at 07/24/18 0950      Physical Exam  Constitutional  BP (!) 177/83   Pulse 69   Temp 98 °F (36.7 °C) (Oral)   Resp 18   Ht 6' 4\" (1.93 m)   Wt 243 lb 13.3 oz (110.6 kg)   SpO2 95%   BMI 29.68 kg/m²     General Alert, no distress, well-nourished  Eyes: fundoscopic exam revealed no hemorrhage   Cardiovascular: Rate regular. No murmurs  Psychiatric: cooperative with examination, no  psychotic behavior noted. Neurologic  Mental status:   orientation to person and place   General fund of knowledge grossly intact   Memory grossly intact   Attention intact as able to attend well to the exam     Language fluent in conversation   Comprehension intact; follows simple commands  Cranial nerves:   CN2: Visual Fields full w/o extinction on confrontational testing,   CN 3,4,6: pupils equal and reactive to light, extraocular muscles intact,  CN5: facial sensation symmetric   CN7:face symmetric without dysarthria,   CN8: hearing symmetric to finger rub   CN9: palate elevated symmetrically  CN11: trap full strength on shoulder shrug  CN12: tongue midline with protrusion  Motor Exam:  Mild weakness in RUE, decreased fine motor and pronator drift. Hip flexion 4 on right   Motor Exam:    R  L    Deltoid 4 5   Biceps 4+ 5   Triceps 4+ 5   Interossei 5 5        R  L    Hip flexion  4  5   Knee flexion  5 5   Knee extension  5 5   Ankle dorsiflexion  5 5   Ankle plantar flexion  5 5        Sensory: light touch intact and symmetric in all 4 extremities.   No sensory extinction on double simultaneous stimulation  Cerebellar/coordination: finger nose finger normal without ataxia  Gait: normal gait, normal tandem          Images:   MRI brain:  Area of

## 2018-07-25 NOTE — PROGRESS NOTES
restrictions noted  Subjective   General  Chart Reviewed: Yes  Additional Pertinent Hx: Admit 7/23 with R side weakness and numbness and slurred speech;  CTA remarkable for severe right ICA stenosis and moderate left ICA stenosis; neuro consult; PMHx: asthma, depression, HLD, HTN, DM, knee surgery, eye surgery, spine surgery  Referring Practitioner: Renuka Jones MD  Subjective  Subjective: Pt found ambulating in clement with wife and use of RW. Slightly unsteady when turning to look back. Pt agreeable to PT session.   Pain Screening  Patient Currently in Pain: Denies       Orientation  Orientation  Overall Orientation Status: Within Normal Limits  Objective   Bed mobility  Sit to Supine: Supervision  Scooting: Supervision (cues for technique)  Transfers  Sit to Stand: Contact guard assistance  Stand to sit: Contact guard assistance (cues to keep walker close and for hand placement)  Ambulation  Ambulation?: Yes  Ambulation 1  Assistance: Contact guard assistance  Quality of Gait: increased lateral sway at times, inconsistent step length, able to clear toes on R foot ~75% of the time, multiple episodes of unsteadiness particularly with turning walker, needs cues for safety with walker during turns  Distance: 120 ft including 180 deg turn and 360 deg turn; also ambulated 10 ft x 2 with RW and CGA into bathroom with cues for use of walker  Stairs/Curb  Stairs?: Yes (negotiated 4 steps with rail (to simulate use of cane) with CGA, step to pattern when ascending and descending, R LE buckling with descending but no LOB)     Balance  Sitting - Static: Good  Sitting - Dynamic: Good  Standing - Static: Fair  Standing - Dynamic: Fair (CGA with RW)  Exercises  Comments: educated pt/wife on LE ex including APs (40-50 reps to increase endurance), LAQ and seated step taps to improve motor control of R LE                        Assessment   Body structures, Functions, Activity limitations: Decreased functional mobility   Assessment: Pt remains below his functional baseline. Requires use of RW for balance. R LE strength, endurance, and motor control deficits noted again. Rec continued PT. Would benefit from home PT initially - pt/wife aware and in agreement. Rec rolling walker. Social work notified. Treatment Diagnosis: impaired gait, decreased R LE strength and motor control  Prognosis: Good  Patient Education: role of PT, use of call light, d/c planning; pt demonstrates understanding. REQUIRES PT FOLLOW UP: Yes     G-Code     OutComes Score                                                    AM-PAC Score  AM-PAC Inpatient Mobility Raw Score : 18  AM-PAC Inpatient T-Scale Score : 43.63  Mobility Inpatient CMS 0-100% Score: 46.58  Mobility Inpatient CMS G-Code Modifier : CK          Goals  Short term goals  Time Frame for Short term goals: discharge  Short term goal 1: Pt will transfer sit <--> stand with supervision  ongoing 7/25  Short term goal 2: Pt will ambulate  150 ft with LRAD and SBA  ongoing 7/25  Short term goal 3: Pt will negotiate 3 steps with CGA  MET 7/25  Patient Goals   Patient goals : return home    Plan    Plan  Times per week: 5-7  Current Treatment Recommendations: Strengthening, Balance Training, Functional Mobility Training, Transfer Training, Endurance Training, Gait Training, Stair training, Patient/Caregiver Education & Training, Equipment Evaluation, Education, & procurement  Safety Devices  Type of devices: Call light within reach, Nurse notified, Gait belt, Bed alarm in place, Left in bed     Therapy Time   Individual Concurrent Group Co-treatment   Time In 1437         Time Out 1530         Minutes 53                 Timed Code Treatment Minutes:  53    Total Treatment Minutes:  53    If patient is discharged prior to next treatment, this note will serve as the discharge summary.   Mirela Bolivar, PT, DPT  [id number]

## 2018-07-26 LAB
EKG ATRIAL RATE: 75 BPM
EKG DIAGNOSIS: NORMAL
EKG P AXIS: 58 DEGREES
EKG P-R INTERVAL: 188 MS
EKG Q-T INTERVAL: 380 MS
EKG QRS DURATION: 84 MS
EKG QTC CALCULATION (BAZETT): 424 MS
EKG R AXIS: -5 DEGREES
EKG T AXIS: 53 DEGREES
EKG VENTRICULAR RATE: 75 BPM

## 2018-07-26 NOTE — DISCHARGE SUMMARY
Hospital Medicine Discharge Summary    Patient ID: Morteza Kuhn      Patient's PCP: Andrae Cerna MD    Admit Date: 7/23/2018     Discharge Date: 7/25/2018    Admitting Physician: Yarelis Vaughan MD     Discharge Physician: Yarelis Vaughan MD     Discharge Diagnoses:    Principal Problem:    Acute CVA (cerebrovascular accident) [facility])  Active Problems:    Type 2 diabetes mellitus ([facility]    Hyperlipidemia    Hypertension    Stenosis of right carotid artery    [facility]  Resolved Problems:    * No resolved hospital problems. *        The patient was seen and examined on day of discharge and this discharge summary is in conjunction with any daily progress note from day of discharge. Hospital Course:     Morteza Kuhn is a 70 y.o. male who was admitted for acute onset of right sided arm and leg weakness, with slurred speech and mild facial droop. He presented out of window for tPA. Initial CT head was within normal limits. CTA demonstrated R ICA > 50% stenosis, L ICA less than 50% stenosis. MRI brain was positive for small patchy acute ischemic insults in L ventral melba, and tiny remote lacunar infarct. Pt was seen by neurology, started on high intensity statin and daily low dose aspirin, he did well on PT, OT and speech evaluations and was discharged home with home health in stable condition          Exam:     BP (!) 153/77   Pulse 71   Temp 98.5 °F (36.9 °C) (Oral)   Resp 18   Ht 6' 4\" (1.93 m)   Wt 243 lb 13.3 oz (110.6 kg)   SpO2 96%   BMI 29.68 kg/m²       General appearance:  No apparent distress, appears stated age and cooperative. HEENT:  Normal cephalic, atraumatic without obvious deformity. Pupils equal, round, and reactive to light. Extra ocular muscles intact. Conjunctivae/corneas clear. Neck: Supple, with full range of motion. No jugular venous distention. Trachea midline. Respiratory:  Normal respiratory effort.  Clear to auscultation, bilaterally without IV  3-D reconstruction    HISTORY: Same    Thin section scans from skull base to cranial vertex with IV contrast. 3-D reconstruction at separate workstation with maximum intensity projection    Imaging through the brain shows no enhancing mass. Ventricles midline and normal in size. Mastoid air cell complexes and paranasal sinuses clear    Calcific plaques are located at the supraclinoid segment of each internal carotid artery, associated with mild (less than 30% diameter) stenosis bilaterally. Anterior, middle and posterior cerebral arteries show no large branch occlusion or high-grade   stenosis. With raw data axial images, both distal vertebral arteries and basilar artery are patent. 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 No aneurysm greater than 4 mm. IMPRESSION:    Calcific plaques at the supraclinoid segment of each distal internal carotid artery, each associated with mild (less than 30% diameter) stenosis. Consults:     IP CONSULT TO HOSPITALIST  IP CONSULT TO NEUROLOGY  IP CONSULT TO SOCIAL WORK  IP CONSULT TO HOME CARE NEEDS  IP CONSULT TO HOME CARE NEEDS    Disposition:  home     Condition at Discharge: Stable    Discharge Instructions/Follow-up:      Discuss with your primary care physician and neurologist about need for carotid endarterectomy      Stephanie Jordan MD  [facility] [place] [address]  [phone]    In 1 week      Gold Graf MD  [address] [address]   [facility]. [place]   [phone]    In 2 weeks      [place] [address]            Code Status:  Prior     Activity: activity as tolerated    Diet: cardiac diet      Discharge Medications:     Discharge Medication List as of 7/25/2018 10:40 AM           Details   atorvastatin (LIPITOR) 80 MG tablet Take 1 tablet by mouth nightly, Disp-30 tablet, R-3Normal              Details   metFORMIN

## 2018-07-29 ENCOUNTER — HOSPITAL ENCOUNTER (INPATIENT)
Age: 68
LOS: 1 days | Discharge: NURSING, REHAB OR LONG-TERM CARE | DRG: 057 | End: 2018-07-30
Attending: EMERGENCY MEDICINE | Admitting: INTERNAL MEDICINE
Payer: MEDICARE

## 2018-07-29 ENCOUNTER — APPOINTMENT (OUTPATIENT)
Dept: CT IMAGING | Age: 68
DRG: 057 | End: 2018-07-29
Payer: MEDICARE

## 2018-07-29 ENCOUNTER — APPOINTMENT (OUTPATIENT)
Dept: GENERAL RADIOLOGY | Age: 68
DRG: 057 | End: 2018-07-29
Payer: MEDICARE

## 2018-07-29 DIAGNOSIS — R53.1 RIGHT SIDED WEAKNESS: Primary | ICD-10-CM

## 2018-07-29 PROBLEM — G81.91 RIGHT HEMIPLEGIA (HCC): Status: ACTIVE | Noted: 2018-07-29

## 2018-07-29 PROBLEM — I69.993 CVA, OLD, ATAXIA: Status: ACTIVE | Noted: 2018-07-29

## 2018-07-29 PROBLEM — R53.81 DEBILITY: Status: ACTIVE | Noted: 2018-07-29

## 2018-07-29 LAB
ANION GAP SERPL CALCULATED.3IONS-SCNC: 10 MMOL/L (ref 3–16)
BASOPHILS ABSOLUTE: 0 K/UL (ref 0–0.2)
BASOPHILS RELATIVE PERCENT: 0.5 %
BILIRUBIN URINE: NEGATIVE MG/DL
BLOOD, URINE: NEGATIVE
BUN BLDV-MCNC: 24 MG/DL (ref 7–20)
CALCIUM SERPL-MCNC: 9.8 MG/DL (ref 8.3–10.6)
CHLORIDE BLD-SCNC: 102 MMOL/L (ref 99–110)
CLARITY: ABNORMAL
CO2: 30 MMOL/L (ref 21–32)
COLOR: ABNORMAL
CREAT SERPL-MCNC: 0.9 MG/DL (ref 0.8–1.3)
EOSINOPHILS ABSOLUTE: 0.3 K/UL (ref 0–0.6)
EOSINOPHILS RELATIVE PERCENT: 4 %
EPITHELIAL CELLS, UA: NORMAL /HPF
GFR AFRICAN AMERICAN: >60
GFR NON-AFRICAN AMERICAN: >60
GLUCOSE BLD-MCNC: 157 MG/DL (ref 70–99)
GLUCOSE URINE: NEGATIVE MG/DL
HCT VFR BLD CALC: 40.6 % (ref 40.5–52.5)
HEMOGLOBIN: 13.8 G/DL (ref 13.5–17.5)
KETONES, URINE: NEGATIVE MG/DL
LEUKOCYTE ESTERASE, URINE: NEGATIVE
LYMPHOCYTES ABSOLUTE: 1.4 K/UL (ref 1–5.1)
LYMPHOCYTES RELATIVE PERCENT: 19 %
MCH RBC QN AUTO: 30.3 PG (ref 26–34)
MCHC RBC AUTO-ENTMCNC: 34 G/DL (ref 31–36)
MCV RBC AUTO: 89.3 FL (ref 80–100)
MICROSCOPIC EXAMINATION: YES
MONOCYTES ABSOLUTE: 0.6 K/UL (ref 0–1.3)
MONOCYTES RELATIVE PERCENT: 8.1 %
NEUTROPHILS ABSOLUTE: 5.2 K/UL (ref 1.7–7.7)
NEUTROPHILS RELATIVE PERCENT: 68.4 %
NITRITE, URINE: NEGATIVE
PDW BLD-RTO: 13.5 % (ref 12.4–15.4)
PH UA: 5.5
PLATELET # BLD: 176 K/UL (ref 135–450)
PMV BLD AUTO: 7.7 FL (ref 5–10.5)
POTASSIUM SERPL-SCNC: 4.3 MMOL/L (ref 3.5–5.1)
PROTEIN UA: ABNORMAL MG/DL
RBC # BLD: 4.55 M/UL (ref 4.2–5.9)
RBC UA: NORMAL /HPF (ref 0–2)
SODIUM BLD-SCNC: 142 MMOL/L (ref 136–145)
SPECIFIC GRAVITY UA: 1.02
TROPONIN: <0.01 NG/ML
UROBILINOGEN, URINE: 0.2 E.U./DL
WBC # BLD: 7.6 K/UL (ref 4–11)
WBC UA: NORMAL /HPF (ref 0–5)

## 2018-07-29 PROCEDURE — 81001 URINALYSIS AUTO W/SCOPE: CPT

## 2018-07-29 PROCEDURE — 71046 X-RAY EXAM CHEST 2 VIEWS: CPT

## 2018-07-29 PROCEDURE — 93005 ELECTROCARDIOGRAM TRACING: CPT | Performed by: PHYSICIAN ASSISTANT

## 2018-07-29 PROCEDURE — 80048 BASIC METABOLIC PNL TOTAL CA: CPT

## 2018-07-29 PROCEDURE — 84484 ASSAY OF TROPONIN QUANT: CPT

## 2018-07-29 PROCEDURE — 96360 HYDRATION IV INFUSION INIT: CPT

## 2018-07-29 PROCEDURE — 6370000000 HC RX 637 (ALT 250 FOR IP): Performed by: INTERNAL MEDICINE

## 2018-07-29 PROCEDURE — 99285 EMERGENCY DEPT VISIT HI MDM: CPT

## 2018-07-29 PROCEDURE — 1200000000 HC SEMI PRIVATE

## 2018-07-29 PROCEDURE — 6360000002 HC RX W HCPCS: Performed by: INTERNAL MEDICINE

## 2018-07-29 PROCEDURE — 2580000003 HC RX 258: Performed by: PHYSICIAN ASSISTANT

## 2018-07-29 PROCEDURE — 2580000003 HC RX 258: Performed by: INTERNAL MEDICINE

## 2018-07-29 PROCEDURE — 94760 N-INVAS EAR/PLS OXIMETRY 1: CPT

## 2018-07-29 PROCEDURE — 85025 COMPLETE CBC W/AUTO DIFF WBC: CPT

## 2018-07-29 PROCEDURE — 94664 DEMO&/EVAL PT USE INHALER: CPT

## 2018-07-29 PROCEDURE — 70450 CT HEAD/BRAIN W/O DYE: CPT

## 2018-07-29 RX ORDER — HYDROCHLOROTHIAZIDE 25 MG/1
12.5 TABLET ORAL DAILY
Status: DISCONTINUED | OUTPATIENT
Start: 2018-07-30 | End: 2018-07-30 | Stop reason: HOSPADM

## 2018-07-29 RX ORDER — FLUTICASONE PROPIONATE 50 MCG
1 SPRAY, SUSPENSION (ML) NASAL DAILY
Status: DISCONTINUED | OUTPATIENT
Start: 2018-07-29 | End: 2018-07-30 | Stop reason: HOSPADM

## 2018-07-29 RX ORDER — ATORVASTATIN CALCIUM 80 MG/1
80 TABLET, FILM COATED ORAL NIGHTLY
Status: DISCONTINUED | OUTPATIENT
Start: 2018-07-29 | End: 2018-07-30 | Stop reason: HOSPADM

## 2018-07-29 RX ORDER — LOSARTAN POTASSIUM 100 MG/1
100 TABLET ORAL DAILY
Status: DISCONTINUED | OUTPATIENT
Start: 2018-07-30 | End: 2018-07-30 | Stop reason: HOSPADM

## 2018-07-29 RX ORDER — SODIUM CHLORIDE 0.9 % (FLUSH) 0.9 %
10 SYRINGE (ML) INJECTION EVERY 12 HOURS SCHEDULED
Status: DISCONTINUED | OUTPATIENT
Start: 2018-07-29 | End: 2018-07-30 | Stop reason: HOSPADM

## 2018-07-29 RX ORDER — ONDANSETRON 2 MG/ML
4 INJECTION INTRAMUSCULAR; INTRAVENOUS EVERY 6 HOURS PRN
Status: DISCONTINUED | OUTPATIENT
Start: 2018-07-29 | End: 2018-07-30 | Stop reason: HOSPADM

## 2018-07-29 RX ORDER — GLIPIZIDE 5 MG/1
5 TABLET ORAL
Status: DISCONTINUED | OUTPATIENT
Start: 2018-07-30 | End: 2018-07-30 | Stop reason: HOSPADM

## 2018-07-29 RX ORDER — ALBUTEROL SULFATE 90 UG/1
1 AEROSOL, METERED RESPIRATORY (INHALATION) EVERY 4 HOURS PRN
Status: DISCONTINUED | OUTPATIENT
Start: 2018-07-29 | End: 2018-07-30 | Stop reason: HOSPADM

## 2018-07-29 RX ORDER — 0.9 % SODIUM CHLORIDE 0.9 %
1000 INTRAVENOUS SOLUTION INTRAVENOUS ONCE
Status: COMPLETED | OUTPATIENT
Start: 2018-07-29 | End: 2018-07-29

## 2018-07-29 RX ORDER — METFORMIN HYDROCHLORIDE 500 MG/1
2000 TABLET, EXTENDED RELEASE ORAL
Status: DISCONTINUED | OUTPATIENT
Start: 2018-07-29 | End: 2018-07-30 | Stop reason: HOSPADM

## 2018-07-29 RX ORDER — AZELASTINE 1 MG/ML
2 SPRAY, METERED NASAL 2 TIMES DAILY
Status: DISCONTINUED | OUTPATIENT
Start: 2018-07-29 | End: 2018-07-30 | Stop reason: HOSPADM

## 2018-07-29 RX ORDER — AMLODIPINE BESYLATE 10 MG/1
10 TABLET ORAL DAILY
Status: DISCONTINUED | OUTPATIENT
Start: 2018-07-29 | End: 2018-07-30 | Stop reason: HOSPADM

## 2018-07-29 RX ORDER — GUAIFENESIN 600 MG/1
1200 TABLET, EXTENDED RELEASE ORAL DAILY
Status: DISCONTINUED | OUTPATIENT
Start: 2018-07-29 | End: 2018-07-30 | Stop reason: HOSPADM

## 2018-07-29 RX ORDER — TAMSULOSIN HYDROCHLORIDE 0.4 MG/1
0.4 CAPSULE ORAL NIGHTLY
Status: DISCONTINUED | OUTPATIENT
Start: 2018-07-29 | End: 2018-07-30 | Stop reason: HOSPADM

## 2018-07-29 RX ORDER — SODIUM CHLORIDE 0.9 % (FLUSH) 0.9 %
10 SYRINGE (ML) INJECTION PRN
Status: DISCONTINUED | OUTPATIENT
Start: 2018-07-29 | End: 2018-07-30 | Stop reason: HOSPADM

## 2018-07-29 RX ORDER — METHOCARBAMOL 750 MG/1
750 TABLET, FILM COATED ORAL 3 TIMES DAILY
Status: DISCONTINUED | OUTPATIENT
Start: 2018-07-29 | End: 2018-07-30 | Stop reason: HOSPADM

## 2018-07-29 RX ORDER — ASPIRIN 81 MG/1
81 TABLET ORAL DAILY
Status: DISCONTINUED | OUTPATIENT
Start: 2018-07-29 | End: 2018-07-30 | Stop reason: HOSPADM

## 2018-07-29 RX ORDER — PIOGLITAZONEHYDROCHLORIDE 15 MG/1
15 TABLET ORAL DAILY
Status: DISCONTINUED | OUTPATIENT
Start: 2018-07-29 | End: 2018-07-30 | Stop reason: HOSPADM

## 2018-07-29 RX ORDER — IRBESARTAN AND HYDROCHLOROTHIAZIDE 300; 12.5 MG/1; MG/1
1 TABLET, FILM COATED ORAL DAILY
Status: DISCONTINUED | OUTPATIENT
Start: 2018-07-29 | End: 2018-07-29

## 2018-07-29 RX ADMIN — ENOXAPARIN SODIUM 40 MG: 40 INJECTION SUBCUTANEOUS at 19:56

## 2018-07-29 RX ADMIN — SODIUM CHLORIDE 1000 ML: 0.9 INJECTION, SOLUTION INTRAVENOUS at 12:59

## 2018-07-29 RX ADMIN — METFORMIN HYDROCHLORIDE 2000 MG: 500 TABLET, EXTENDED RELEASE ORAL at 19:56

## 2018-07-29 RX ADMIN — Medication 10 ML: at 21:25

## 2018-07-29 RX ADMIN — METHOCARBAMOL 750 MG: 750 TABLET ORAL at 21:25

## 2018-07-29 RX ADMIN — TAMSULOSIN HYDROCHLORIDE 0.4 MG: 0.4 CAPSULE ORAL at 21:25

## 2018-07-29 RX ADMIN — ATORVASTATIN CALCIUM 80 MG: 80 TABLET, FILM COATED ORAL at 21:25

## 2018-07-29 ASSESSMENT — ENCOUNTER SYMPTOMS
NAUSEA: 0
ABDOMINAL PAIN: 0
CONSTIPATION: 0
COLOR CHANGE: 0
CHEST TIGHTNESS: 0
CHOKING: 0
VOMITING: 0
COUGH: 0
DIARRHEA: 0
SHORTNESS OF BREATH: 0

## 2018-07-29 ASSESSMENT — PAIN SCALES - GENERAL
PAINLEVEL_OUTOF10: 0
PAINLEVEL_OUTOF10: 0

## 2018-07-29 NOTE — H&P
Hospital Medicine History & Physical      PCP: Susie Page MD    Date of Admission: 7/29/2018    Date of Service: Pt seen/examined on 07/29/18 5:47 PM   and Admitted to Inpatient with expected LOS greater than two midnights due to medical therapy. Chief Complaint:  Recent CVA, Increasing right sided weakness, inability to take care for himself, need placement to IP rehab. Pt was discharged from [facility] on 7/25. AS per [place] eliud  \"Sanket Montes De Oca is a 70 y.o. male who was admitted for acute onset of right sided arm and leg weakness, with slurred speech and mild facial droop. He presented out of window for tPA. Initial CT head was within normal limits. CTA demonstrated R ICA > 50% stenosis, L ICA less than 50% stenosis. MRI brain was positive for small patchy acute ischemic insults in L ventral melba, and tiny remote lacunar infarct. Pt was seen by neurology, started on high intensity statin and daily low dose aspirin, he did well on PT, OT and speech evaluations and was discharged home with home health in stable condition'. Pt presented from home because family was not able to provide what he needed. There was risk of fall and wife was not to manage him. Pt has recent CVA with right hemiplegia. Pt think that his weakness on right side is getting worse, he was able to walk with rolling walker when he was discharged last week, now he cant do so. Pt was tearful. Denies any fever, chill, headache, blurry vision. CT scan was done in ER and was negative for new change. UA was negative. CBC,BMP stable. POC troponin was negative.        Past Medical History:        Diagnosis Date    Asthma     BPH     Cataract 1999    Removed 1999 & 2000    [facility]erebral [facility] occlusion [facility]     Depression     GERD (gastroesophageal reflux disease)     Hyperlipidemia     Hypertension     Type 2 diabetes mellitus ([facility]        Past Surgical History:        Procedure Laterality daily. Yes Historical Provider, MD       Allergies:  Latex; Amoxicillin; Amoxil [amoxicillin trihydrate]; Adhesive tape; and Cefuroxime    Social History:  The patient currently lives at home with his wife. TOBACCO:   reports that he has never smoked. He has never used smokeless tobacco.  ETOH:   reports that he drinks alcohol. Family History:  Reviewed in detail and negative for DM, Early CAD, Cancer, CVA. Positive as follows:    Family History   Problem Relation Age of Onset    Heart Disease Father     High Blood Pressure Father     Depression Mother     Diabetes Mother          REVIEW OF SYSTEMS:  CONSTITUTIONAL:  Neg   Recent weight changes,fatigue,fever,chills or night sweats  EYES:  Neg  blurriness,tearing,itching or acute change in vision  EARS:  Neg   hearing loss,tinnitus,vertigo,discharge or earache. NOSE:  Neg  rhinorrhea,sneezing,itching,allergy or epistaxis  MOUTH/THROAT:  Neg  bleeding gums,hoarseness or sore throat. RESPIRATORY:   Neg SOB,wheeze,cough,sputum,hemoptysis or brnochitis  CARDIOVASCULAR   Neg : chest pain,palpitations,dyspnea on exertion,orthopnea,paroxysmal nocturnal dyspnea or edema  GASTROINTESTINAL:  Neg   Appetite changes,nausea,vomiting,or diarrhea,indigestion,dysphagia,change in bowel movements, or abdominal pain. GENITOURINARY:  Neg  Urinary frequency,hesitancy,urgency,polyuria,dysuria,hematuria,nocturia,or incontinence. HEMATOLOGIC/LYMPHATIC:  Neg  Anemia,bleeding tendency  MUSCULOSKELETAL:    Neg myalgias,bone pain,joint pain,swelling or stiffness and has had  change in gait. NEUROLOGICAL:  Neg  Loss of Consciousness,memeory loss,forgetfulness,periods of confusion,difficulty concentrating,seizures,decline in intellect,nervousness,insomina,aphasia or dysarthria. +Right sided weakness. SKIN :  Neg  skin or hair changes,and has no itching,rashes,sores. PSYCHIATRIC:  Neg depression,personality changes,anxiety.   ENDOCRINE:  Neg polydipsia,polyuria,abnormal weight or extra-axial hemorrhage    Previous infarct left anterior melba noted on MRI examination with maturation measuring 11 x 8 mm. No midline shift mass effect or edema. Impression:       Infarct left melba noted on previous MRI examination July 24, 2018. No acute hemorrhage. Recent Labs      07/29/18   1159   TROPONINI  <0.01       U/A:    Lab Results   Component Value Date    COLORU Not Entered 07/29/2018    WBCUA 0-2 07/29/2018    RBCUA 0-2 07/29/2018    CLARITYU Not Entered 07/29/2018    SPECGRAV 1.025 07/29/2018    LEUKOCYTESUR Negative 07/29/2018    BLOODU Negative 07/29/2018    GLUCOSEU Negative 07/29/2018       ABG  No results found for: FWA3ALS, BEART, O1NYZVNI, PHART, THGBART, CHI2JAE, PO2ART, OWG7LTU        Active Hospital Problems    Diagnosis Date Noted    Type 2 diabetes mellitus ([facility] [E11.9] 06/09/2010     Priority: High    Hyperlipidemia [E78.5] 06/09/2010     Priority: High    CVA, old, ataxia [I69.993] 07/29/2018    Debility [R53.81] 07/29/2018    Stenosis of right carotid artery [I65.21] 07/23/2018    Hypertension [I10] 01/21/2011    BPH (benign prostatic hyperplasia) [N40.0] 06/09/2010         ASSESSMENT/PLAN:    Principal Problem:    Right hemiplegia ([facility] Recent CVA. H/o carotid artery stenosis. MRI was done last week. CT head today show no new change. Continue ASA/STatin. Active Problems:    Type 2 diabetes mellitus (HCC)continue home regimen. Hyperlipidemia. Statin. CVA, old, ataxia. PT/OT    Debility. PT/OT consulted. Fall precaution. BPH (benign prostatic hyperplasia)Flomax. Hypertension. Continue home medication. BP is under control upon arrival.     Stenosis of right carotid artery. Will need f/u vascular surgery as OP. Fall precaution. F/u PT/OT. IP rehab placement if appropriate. DVT Prophylaxis: Lovenox  Diet: DIET CARDIAC;  Code Status: Full Code. Advance directive discussed. Pt has a living will.  His son make decision on his behalf if

## 2018-07-29 NOTE — PLAN OF CARE
Problem: Falls - Risk of:  Goal: Will remain free from falls  Will remain free from falls   Outcome: Ongoing  Patient is A&O x4, VSS. Patient is up x1 with a walker. Patient has remained free of falls. Patient is currently in bed with the bed alarm. Bed is in the lowest position and wheels are locked. Patient calls out accordingly. Call light and bed side table are within reach. Will continue to monitor and assess.

## 2018-07-29 NOTE — ED NOTES
pto ed for c/o increased right side weakness. Pt has recent Hx of cva with right side affected.      Adriana Silva RN  07/29/18 0491

## 2018-07-30 ENCOUNTER — HOSPITAL ENCOUNTER (INPATIENT)
Age: 68
LOS: 15 days | Discharge: HOME | DRG: 057 | End: 2018-08-14
Attending: PHYSICAL MEDICINE & REHABILITATION | Admitting: PHYSICAL MEDICINE & REHABILITATION
Payer: MEDICARE

## 2018-07-30 VITALS
RESPIRATION RATE: 16 BRPM | SYSTOLIC BLOOD PRESSURE: 159 MMHG | HEART RATE: 75 BPM | HEIGHT: 76 IN | TEMPERATURE: 98.7 F | OXYGEN SATURATION: 93 % | DIASTOLIC BLOOD PRESSURE: 67 MMHG

## 2018-07-30 PROBLEM — I63.9 ACUTE ISCHEMIC STROKE (HCC): Status: ACTIVE | Noted: 2018-07-30

## 2018-07-30 LAB
ANION GAP SERPL CALCULATED.3IONS-SCNC: 10 MMOL/L (ref 3–16)
BUN BLDV-MCNC: 21 MG/DL (ref 7–20)
CALCIUM SERPL-MCNC: 9.7 MG/DL (ref 8.3–10.6)
CHLORIDE BLD-SCNC: 101 MMOL/L (ref 99–110)
CO2: 27 MMOL/L (ref 21–32)
CREAT SERPL-MCNC: 0.8 MG/DL (ref 0.8–1.3)
GFR AFRICAN AMERICAN: >60
GFR NON-AFRICAN AMERICAN: >60
GLUCOSE BLD-MCNC: 133 MG/DL (ref 70–99)
GLUCOSE BLD-MCNC: 98 MG/DL (ref 70–99)
HCT VFR BLD CALC: 39.8 % (ref 40.5–52.5)
HEMOGLOBIN: 13.4 G/DL (ref 13.5–17.5)
MAGNESIUM: 1.7 MG/DL (ref 1.8–2.4)
MCH RBC QN AUTO: 30.1 PG (ref 26–34)
MCHC RBC AUTO-ENTMCNC: 33.8 G/DL (ref 31–36)
MCV RBC AUTO: 89.3 FL (ref 80–100)
PDW BLD-RTO: 13.6 % (ref 12.4–15.4)
PERFORMED ON: ABNORMAL
PLATELET # BLD: 165 K/UL (ref 135–450)
PMV BLD AUTO: 8.3 FL (ref 5–10.5)
POTASSIUM REFLEX MAGNESIUM: 3.5 MMOL/L (ref 3.5–5.1)
RBC # BLD: 4.45 M/UL (ref 4.2–5.9)
SODIUM BLD-SCNC: 138 MMOL/L (ref 136–145)
WBC # BLD: 7.8 K/UL (ref 4–11)

## 2018-07-30 PROCEDURE — 97116 GAIT TRAINING THERAPY: CPT

## 2018-07-30 PROCEDURE — G8978 MOBILITY CURRENT STATUS: HCPCS

## 2018-07-30 PROCEDURE — 97535 SELF CARE MNGMENT TRAINING: CPT

## 2018-07-30 PROCEDURE — 36592 COLLECT BLOOD FROM PICC: CPT

## 2018-07-30 PROCEDURE — 1280000000 HC REHAB R&B

## 2018-07-30 PROCEDURE — G8987 SELF CARE CURRENT STATUS: HCPCS

## 2018-07-30 PROCEDURE — 97166 OT EVAL MOD COMPLEX 45 MIN: CPT

## 2018-07-30 PROCEDURE — 6370000000 HC RX 637 (ALT 250 FOR IP): Performed by: PHYSICAL MEDICINE & REHABILITATION

## 2018-07-30 PROCEDURE — 6370000000 HC RX 637 (ALT 250 FOR IP): Performed by: INTERNAL MEDICINE

## 2018-07-30 PROCEDURE — 36591 DRAW BLOOD OFF VENOUS DEVICE: CPT

## 2018-07-30 PROCEDURE — 6360000002 HC RX W HCPCS: Performed by: INTERNAL MEDICINE

## 2018-07-30 PROCEDURE — 83735 ASSAY OF MAGNESIUM: CPT

## 2018-07-30 PROCEDURE — G8988 SELF CARE GOAL STATUS: HCPCS

## 2018-07-30 PROCEDURE — G8979 MOBILITY GOAL STATUS: HCPCS

## 2018-07-30 PROCEDURE — 97162 PT EVAL MOD COMPLEX 30 MIN: CPT

## 2018-07-30 PROCEDURE — 97530 THERAPEUTIC ACTIVITIES: CPT

## 2018-07-30 PROCEDURE — 94760 N-INVAS EAR/PLS OXIMETRY 1: CPT

## 2018-07-30 PROCEDURE — 2580000003 HC RX 258: Performed by: PHYSICAL MEDICINE & REHABILITATION

## 2018-07-30 PROCEDURE — 80048 BASIC METABOLIC PNL TOTAL CA: CPT

## 2018-07-30 PROCEDURE — 94150 VITAL CAPACITY TEST: CPT

## 2018-07-30 PROCEDURE — 2580000003 HC RX 258: Performed by: INTERNAL MEDICINE

## 2018-07-30 PROCEDURE — 85027 COMPLETE CBC AUTOMATED: CPT

## 2018-07-30 PROCEDURE — 36415 COLL VENOUS BLD VENIPUNCTURE: CPT

## 2018-07-30 RX ORDER — TRAZODONE HYDROCHLORIDE 50 MG/1
50 TABLET ORAL NIGHTLY PRN
Status: CANCELLED | OUTPATIENT
Start: 2018-07-30

## 2018-07-30 RX ORDER — DOCUSATE SODIUM 100 MG/1
100 CAPSULE, LIQUID FILLED ORAL 2 TIMES DAILY
Status: CANCELLED | OUTPATIENT
Start: 2018-07-30

## 2018-07-30 RX ORDER — ASPIRIN 81 MG/1
81 TABLET ORAL DAILY
Status: DISCONTINUED | OUTPATIENT
Start: 2018-07-31 | End: 2018-08-14 | Stop reason: HOSPADM

## 2018-07-30 RX ORDER — SODIUM CHLORIDE 0.9 % (FLUSH) 0.9 %
10 SYRINGE (ML) INJECTION EVERY 12 HOURS SCHEDULED
Status: CANCELLED | OUTPATIENT
Start: 2018-07-30

## 2018-07-30 RX ORDER — ATORVASTATIN CALCIUM 80 MG/1
80 TABLET, FILM COATED ORAL NIGHTLY
Status: DISCONTINUED | OUTPATIENT
Start: 2018-07-30 | End: 2018-08-14 | Stop reason: HOSPADM

## 2018-07-30 RX ORDER — LOSARTAN POTASSIUM 100 MG/1
100 TABLET ORAL DAILY
Status: CANCELLED | OUTPATIENT
Start: 2018-07-31

## 2018-07-30 RX ORDER — FLUTICASONE PROPIONATE 50 MCG
1 SPRAY, SUSPENSION (ML) NASAL DAILY
Status: CANCELLED | OUTPATIENT
Start: 2018-07-31

## 2018-07-30 RX ORDER — GLIPIZIDE 5 MG/1
5 TABLET ORAL
Status: CANCELLED | OUTPATIENT
Start: 2018-07-31

## 2018-07-30 RX ORDER — DOCUSATE SODIUM 100 MG/1
100 CAPSULE, LIQUID FILLED ORAL 2 TIMES DAILY
Status: DISCONTINUED | OUTPATIENT
Start: 2018-07-30 | End: 2018-07-30

## 2018-07-30 RX ORDER — SODIUM CHLORIDE 0.9 % (FLUSH) 0.9 %
10 SYRINGE (ML) INJECTION PRN
Status: DISCONTINUED | OUTPATIENT
Start: 2018-07-30 | End: 2018-08-14 | Stop reason: HOSPADM

## 2018-07-30 RX ORDER — AMLODIPINE BESYLATE 10 MG/1
10 TABLET ORAL DAILY
Status: CANCELLED | OUTPATIENT
Start: 2018-07-31

## 2018-07-30 RX ORDER — ASPIRIN 81 MG/1
81 TABLET ORAL DAILY
Status: CANCELLED | OUTPATIENT
Start: 2018-07-31

## 2018-07-30 RX ORDER — ACETAMINOPHEN 325 MG/1
650 TABLET ORAL EVERY 4 HOURS PRN
Status: CANCELLED | OUTPATIENT
Start: 2018-07-30

## 2018-07-30 RX ORDER — GLIPIZIDE 5 MG/1
5 TABLET ORAL
Status: DISCONTINUED | OUTPATIENT
Start: 2018-07-31 | End: 2018-08-14 | Stop reason: HOSPADM

## 2018-07-30 RX ORDER — PIOGLITAZONEHYDROCHLORIDE 15 MG/1
15 TABLET ORAL DAILY
Status: DISCONTINUED | OUTPATIENT
Start: 2018-07-31 | End: 2018-08-14 | Stop reason: HOSPADM

## 2018-07-30 RX ORDER — AMLODIPINE BESYLATE 10 MG/1
10 TABLET ORAL DAILY
Status: DISCONTINUED | OUTPATIENT
Start: 2018-07-31 | End: 2018-08-14 | Stop reason: HOSPADM

## 2018-07-30 RX ORDER — METFORMIN HYDROCHLORIDE 500 MG/1
2000 TABLET, EXTENDED RELEASE ORAL
Status: CANCELLED | OUTPATIENT
Start: 2018-07-30

## 2018-07-30 RX ORDER — AZELASTINE 1 MG/ML
2 SPRAY, METERED NASAL 2 TIMES DAILY
Status: CANCELLED | OUTPATIENT
Start: 2018-07-30

## 2018-07-30 RX ORDER — PIOGLITAZONEHYDROCHLORIDE 15 MG/1
15 TABLET ORAL DAILY
Status: CANCELLED | OUTPATIENT
Start: 2018-07-31

## 2018-07-30 RX ORDER — ALBUTEROL SULFATE 90 UG/1
1 AEROSOL, METERED RESPIRATORY (INHALATION) EVERY 4 HOURS PRN
Status: DISCONTINUED | OUTPATIENT
Start: 2018-07-30 | End: 2018-08-14 | Stop reason: HOSPADM

## 2018-07-30 RX ORDER — ATORVASTATIN CALCIUM 80 MG/1
80 TABLET, FILM COATED ORAL NIGHTLY
Status: CANCELLED | OUTPATIENT
Start: 2018-07-30

## 2018-07-30 RX ORDER — METHOCARBAMOL 750 MG/1
750 TABLET, FILM COATED ORAL 3 TIMES DAILY
Status: CANCELLED | OUTPATIENT
Start: 2018-07-30

## 2018-07-30 RX ORDER — GUAIFENESIN 600 MG/1
1200 TABLET, EXTENDED RELEASE ORAL DAILY
Status: CANCELLED | OUTPATIENT
Start: 2018-07-31

## 2018-07-30 RX ORDER — GUAIFENESIN 600 MG/1
1200 TABLET, EXTENDED RELEASE ORAL DAILY
Status: DISCONTINUED | OUTPATIENT
Start: 2018-07-31 | End: 2018-08-14 | Stop reason: HOSPADM

## 2018-07-30 RX ORDER — ALBUTEROL SULFATE 90 UG/1
1 AEROSOL, METERED RESPIRATORY (INHALATION) EVERY 4 HOURS PRN
Status: CANCELLED | OUTPATIENT
Start: 2018-07-30

## 2018-07-30 RX ORDER — LOSARTAN POTASSIUM 100 MG/1
100 TABLET ORAL DAILY
Status: DISCONTINUED | OUTPATIENT
Start: 2018-07-31 | End: 2018-07-31

## 2018-07-30 RX ORDER — TAMSULOSIN HYDROCHLORIDE 0.4 MG/1
0.4 CAPSULE ORAL NIGHTLY
Status: CANCELLED | OUTPATIENT
Start: 2018-07-30

## 2018-07-30 RX ORDER — HYDROCHLOROTHIAZIDE 25 MG/1
12.5 TABLET ORAL DAILY
Status: CANCELLED | OUTPATIENT
Start: 2018-07-31

## 2018-07-30 RX ORDER — ACETAMINOPHEN 325 MG/1
650 TABLET ORAL EVERY 4 HOURS PRN
Status: DISCONTINUED | OUTPATIENT
Start: 2018-07-30 | End: 2018-08-14 | Stop reason: HOSPADM

## 2018-07-30 RX ORDER — TRAZODONE HYDROCHLORIDE 50 MG/1
50 TABLET ORAL NIGHTLY PRN
Status: DISCONTINUED | OUTPATIENT
Start: 2018-07-30 | End: 2018-08-14 | Stop reason: HOSPADM

## 2018-07-30 RX ORDER — SENNA AND DOCUSATE SODIUM 50; 8.6 MG/1; MG/1
2 TABLET, FILM COATED ORAL 2 TIMES DAILY
Status: DISCONTINUED | OUTPATIENT
Start: 2018-07-30 | End: 2018-08-14 | Stop reason: HOSPADM

## 2018-07-30 RX ORDER — METHOCARBAMOL 750 MG/1
750 TABLET, FILM COATED ORAL 3 TIMES DAILY
Status: DISCONTINUED | OUTPATIENT
Start: 2018-07-30 | End: 2018-08-14 | Stop reason: HOSPADM

## 2018-07-30 RX ORDER — FLUTICASONE PROPIONATE 50 MCG
1 SPRAY, SUSPENSION (ML) NASAL DAILY
Status: DISCONTINUED | OUTPATIENT
Start: 2018-07-31 | End: 2018-08-14 | Stop reason: HOSPADM

## 2018-07-30 RX ORDER — AZELASTINE 1 MG/ML
2 SPRAY, METERED NASAL 2 TIMES DAILY
Status: DISCONTINUED | OUTPATIENT
Start: 2018-07-30 | End: 2018-08-14 | Stop reason: HOSPADM

## 2018-07-30 RX ORDER — TAMSULOSIN HYDROCHLORIDE 0.4 MG/1
0.4 CAPSULE ORAL NIGHTLY
Status: DISCONTINUED | OUTPATIENT
Start: 2018-07-30 | End: 2018-08-14 | Stop reason: HOSPADM

## 2018-07-30 RX ORDER — ONDANSETRON 4 MG/1
8 TABLET, ORALLY DISINTEGRATING ORAL EVERY 8 HOURS PRN
Status: CANCELLED | OUTPATIENT
Start: 2018-07-30

## 2018-07-30 RX ORDER — SODIUM CHLORIDE 0.9 % (FLUSH) 0.9 %
10 SYRINGE (ML) INJECTION PRN
Status: CANCELLED | OUTPATIENT
Start: 2018-07-30

## 2018-07-30 RX ORDER — SODIUM CHLORIDE 0.9 % (FLUSH) 0.9 %
10 SYRINGE (ML) INJECTION EVERY 12 HOURS SCHEDULED
Status: DISCONTINUED | OUTPATIENT
Start: 2018-07-30 | End: 2018-07-31

## 2018-07-30 RX ORDER — HYDROCHLOROTHIAZIDE 25 MG/1
12.5 TABLET ORAL DAILY
Status: DISCONTINUED | OUTPATIENT
Start: 2018-07-31 | End: 2018-07-31

## 2018-07-30 RX ORDER — ONDANSETRON 8 MG/1
8 TABLET, ORALLY DISINTEGRATING ORAL EVERY 8 HOURS PRN
Status: DISCONTINUED | OUTPATIENT
Start: 2018-07-30 | End: 2018-08-14 | Stop reason: HOSPADM

## 2018-07-30 RX ADMIN — LOSARTAN POTASSIUM 100 MG: 100 TABLET ORAL at 09:11

## 2018-07-30 RX ADMIN — Medication 10 ML: at 22:47

## 2018-07-30 RX ADMIN — METHOCARBAMOL 750 MG: 750 TABLET ORAL at 22:41

## 2018-07-30 RX ADMIN — PIOGLITAZONE 15 MG: 15 TABLET ORAL at 09:09

## 2018-07-30 RX ADMIN — TAMSULOSIN HYDROCHLORIDE 0.4 MG: 0.4 CAPSULE ORAL at 22:41

## 2018-07-30 RX ADMIN — GLIPIZIDE 5 MG: 5 TABLET ORAL at 09:10

## 2018-07-30 RX ADMIN — AMLODIPINE BESYLATE 10 MG: 10 TABLET ORAL at 09:10

## 2018-07-30 RX ADMIN — METHOCARBAMOL 750 MG: 750 TABLET ORAL at 09:10

## 2018-07-30 RX ADMIN — GUAIFENESIN 1200 MG: 600 TABLET, EXTENDED RELEASE ORAL at 09:11

## 2018-07-30 RX ADMIN — METFORMIN HYDROCHLORIDE 2000 MG: 500 TABLET, EXTENDED RELEASE ORAL at 22:42

## 2018-07-30 RX ADMIN — ATORVASTATIN CALCIUM 80 MG: 80 TABLET, FILM COATED ORAL at 22:41

## 2018-07-30 RX ADMIN — HYDROCHLOROTHIAZIDE 12.5 MG: 25 TABLET ORAL at 09:09

## 2018-07-30 RX ADMIN — ASPIRIN 81 MG: 81 TABLET, COATED ORAL at 09:10

## 2018-07-30 RX ADMIN — FLUTICASONE PROPIONATE 1 SPRAY: 50 SPRAY, METERED NASAL at 09:08

## 2018-07-30 RX ADMIN — ENOXAPARIN SODIUM 40 MG: 40 INJECTION SUBCUTANEOUS at 09:09

## 2018-07-30 RX ADMIN — Medication 10 ML: at 10:20

## 2018-07-30 RX ADMIN — METHOCARBAMOL 750 MG: 750 TABLET ORAL at 15:44

## 2018-07-30 ASSESSMENT — PAIN SCALES - GENERAL
PAINLEVEL_OUTOF10: 0

## 2018-07-30 NOTE — PROGRESS NOTES
Pt admitted to room 3103. Pt is a/ox4. Oriented to room and call light. VSS, no distress noted. Bed locked in lowest position, non skid footwear provided, pt up in chair with alarm on. Bed extender added to bed due to pt's height of 6'4\". Admission/assessment in progress.

## 2018-07-30 NOTE — PROGRESS NOTES
Occupational Therapy   Occupational Therapy Initial Assessment and Treatment  Date: 2018   Patient Name: Asim Cooper  MRN: [id number]     : 1950    Date of Service: 2018    Discharge Recommendations: Asim Cooper scored a 16/24 on the AM-PAC ADL Inpatient form. Current research shows that an AM-PAC score of 17 or less is typically not associated with a discharge to the patient's home setting. Based on the patients AM-PAC score and their current ADL deficits, it is recommended that the patient have 5-7 sessions per week of Occupational Therapy at d/c to increase the patients independence. OT Equipment Recommendations  Equipment Needed: No (defer)      Patient Diagnosis(es): The encounter diagnosis was Right sided weakness. has a past medical history of Asthma; BPH; Cataract; Cerebral artery occlusion with cerebral infarction ([address] Depression; GERD (gastroesophageal reflux disease); Hyperlipidemia; Hypertension; and Type 2 diabetes mellitus ([address] has a past surgical history that includes Lumbar disc surgery; Nasal septum surgery; Spine surgery (); knee surgery (); eye surgery; Colonoscopy; and other surgical history (N/A, 2016). Treatment Diagnosis: Impaired ADLs/transfers and mobility       Restrictions  Position Activity Restriction  Other position/activity restrictions: Up with assist    Subjective   General  Chart Reviewed: Yes  Additional Pertinent Hx: 70 y.o. M admitted  with right sided weakness and facial droop. Head CT= Infarct left melba from scan on , no acute hemorrhage. CXR (-) for acute findings. Recent admit ~6 days prior for acute CVA, was d/c'ed home. PMHx= BPH, cataract, cerebral artery occlusion, depression, HTN, DM  Family / Caregiver Present: Yes (wife)  Diagnosis: CVA, old   Subjective  Subjective: Pt sitting up in chair on entry; agreeable to OT. Pt pleasant and cooperative with therapy.  Motivated to regain functional independence. Pain Assessment  Patient Currently in Pain: Denies  Pain Assessment: 0-10  Pain Level: 0      Social/Functional History  Social/Functional History  Lives With: Spouse  Type of Home: House  Home Layout: Two level, Able to Live on Main level with bedroom/bathroom  Home Access: Stairs to enter without rails  Entrance Stairs - Number of Steps: 3  Bathroom Shower/Tub: Tub/Shower unit (tried to use a borrowed shower chair, but tub too narrow)  Bathroom Toilet: Standard (uses towel bar for leverage; Instructed pt on safety of not using towel bar. )  Home Equipment: Rolling walker, Cane  ADL Assistance: Independent  Homemaking Assistance: Independent  Ambulation Assistance: Independent  Transfer Assistance: Independent  Active : Yes  Mode of Transportation: Car  Occupation: Retired  Type of occupation: Worked in [address] [place] [address] Road: Enjoys playing with 4 grandchildren, and playing with electronics  Additional Comments: Pt states that prior to CVA ~1week ago he was completely independent. Since then he has been at home getting around with RW and needing assistance for all mobility and ambulation. Pt states he has had multiple near falls, and feels like his symptoms are worsening. Objective  Treatment included functional transfer training, ADLs, and patient education.      Vision: Impaired  Vision Exceptions: Wears glasses for reading  Hearing: Within functional limits    Orientation  Overall Orientation Status: Within Functional Limits     Balance  Sitting Balance: Supervision (unsupported sitting on commode)  Standing Balance: Minimal assistance  Standing Balance  Activity: functional mobility in room/restroom, standing balance for toileting, LB dressing, grooming; sit/stand transfers and toilet transfer  Sit to stand: Contact guard assistance (light tactile assist at R hand to place on walker handle )  Stand to sit: Contact guard assistance  Functional Mobility  Functional - Mobility Device: Rolling Walker  Activity: To/from bathroom  Assist Level: Minimal assistance (Moderate verbal cueing for walker sequencing- i.e. right foot forward, etc. )  Functional Mobility Comments: Cues to increase step length, douglas on RLE to decrease chances for tripping  Toilet Transfers  Toilet - Technique: Ambulating  Equipment Used: Standard toilet (grab bar on L side)  Toilet Transfer: Minimal assistance  ADL  Grooming: Minimal assistance; Independent;Verbal cueing (vc for thoroughness when drying hands, CGA-min A for balance while standing at sink to wash hands)  UE Dressing: Minimal assistance (to don clean gown )  LE Dressing: Dependent/Total (to don shorts- pt's wife completed before pt able to attempt, assist to pull up over hips)  Toileting: Contact guard assistance           Transfers  Sit to stand: Contact guard assistance (light tactile assist at R hand to place on walker handle )  Stand to sit: Contact guard assistance     Cognition  Overall Cognitive Status: WFL        Sensation  Overall Sensation Status: WFL        LUE AROM (degrees)  LUE AROM : WFL  RUE AROM (degrees)  RUE General AROM: Pt able to achieve ~30 deg shoulder abduction and flexion before attempts at compensation. Elbow flexion AROM ~45 deg  LUE Strength  Gross LUE Strength: WFL  L Hand Grasp: 5/5  RUE Strength  R Hand Grasp: 4/5         Assessment   Performance deficits / Impairments: Decreased functional mobility ; Decreased ADL status; Decreased strength;Decreased ROM; Decreased balance;Decreased endurance;Decreased coordination  Assessment: Functional independence is decreased from baseline. Prior to admit for CVA ~1 week ago, pt was independent w/ all ADLs, transfers, and mobility. Pt is currently limited by weakness in RUE/RLE, requiring increased assistance for all functional tasks/mobility. Pt has good potential to meet therapy goals and would benefit from further inpt OT at d/c. Plans are for ARU at d/c.    Treatment Diagnosis:

## 2018-07-30 NOTE — PROGRESS NOTES
Subjective  Subjective: Pt states that when home therapy came out to work with him they suggested he return to the hospital for placement 2/2 the difficulty he was having at home. Pain Screening  Patient Currently in Pain: Denies  Vital Signs  Patient Currently in Pain: Denies       Orientation  Orientation  Overall Orientation Status: Within Normal Limits    Social/Functional History  Social/Functional History  Lives With: Spouse  Type of Home: House  Home Layout: Two level, Able to Live on Main level with bedroom/bathroom  Home Access: Stairs to enter without rails  Entrance Stairs - Number of Steps: 3  Bathroom Shower/Tub: Tub/Shower unit (tried to use a borrowed shower chair, but tub too narrow)  Bathroom Toilet: Standard (uses towel bar for leverage; Instructed pt on safety of not using towel bar. )  Home Equipment: Rolling walker, Cane  ADL Assistance: Independent  Homemaking Assistance: Independent  Ambulation Assistance: Independent  Transfer Assistance: Independent  Active : Yes  Mode of Transportation: Car  Occupation: Retired  Type of occupation: Worked in [address] [place] [address] Road: Enjoys playing with 4 grandchildren, and playing with electronics  Additional Comments: Pt states that prior to CVA ~1week ago he was completely independent. Since then he has been at home getting around with RW and needing assistance for all mobility and ambulation. Pt states he has had multiple near falls, and feels like his symptoms are worsening. Objective          AROM RLE (degrees)  RLE AROM: WFL  AROM LLE (degrees)  LLE AROM : WFL  Strength RLE  Strength RLE: Exception  R Hip Flexion: 2-/5  R Knee Flexion: 2-/5  R Knee Extension: 3/5  R Ankle Dorsiflexion: 1/5  R Ankle Plantar flexion: 3/5  Strength LLE  Strength LLE: WFL     Sensation  Overall Sensation Status: WFL  Bed mobility  Supine to Sit: Contact guard assistance (HOB flat with the use of side rail; Increased time needed to complete transfer. )  Transfers  Sit to Stand: Contact guard assistance (from bed and recliner; VC needed for hand placement)  Stand to sit: Contact guard assistance (VC for hand placement)  Ambulation  Ambulation?: Yes  Ambulation 1  Surface: level tile  Device: Standard Walker  Assistance: Minimal assistance  Quality of Gait: step to pattern with R foot drag 2/2 decreased DF strength. Pt with multiple LOB that required MIN A to correct  Distance: 15', 20'  Comments: VC for increased B foot clearance, upright posture with fwd gaze, walker positioning/sequencing and safety. Stairs/Curb  Stairs?: No     Balance  Sitting - Static: Good  Sitting - Dynamic: Good  Standing - Static: Fair;-  Standing - Dynamic: Poor  Comments: Pt with multiple LOB during standing dynamic reaching tasks requiring MIN A to correct. Assessment   Body structures, Functions, Activity limitations: Decreased functional mobility ; Decreased endurance;Decreased balance;Decreased strength;Decreased vision/visual deficit  Assessment: Pt is a 69 yo male who is currently functioning below his baseline following a CVA. Pt initially d/c home following CVA, but having increased difficulty as the time went on. Pt stated that he had multiple near falls where his wife had to physically assist him to prevent a fall, and he feels like he is getting weaker. Pt would benefit from continued therapy to increase his independence during all functional mobility. Treatment Diagnosis: Difficulty with functional mobility 2/2 CVA  Prognosis: Excellent  Decision Making: Medium Complexity  Patient Education: Pt educated on PT POC, goals for hospital stay, and d/c recommendations. Pt verbalized understanding.    REQUIRES PT FOLLOW UP: Yes  Activity Tolerance  Activity Tolerance: Patient limited by fatigue;Patient limited by endurance         Plan   Plan  Times per week: 5-7  Current Treatment Recommendations: Strengthening, Balance Training, Endurance Training, Functional Mobility Training, Transfer Training, Gait Training, Stair training, Patient/Caregiver Education & Training, Neuromuscular Re-education  Safety Devices  Type of devices: Left in chair, Call light within reach, Nurse notified, Chair alarm in place    G-Code     OutComes Score                                           AM-PAC Score  AM-PAC Inpatient Mobility Raw Score : 17  AM-PAC Inpatient T-Scale Score : 42.13  Mobility Inpatient CMS 0-100% Score: 50.57  Mobility Inpatient CMS G-Code Modifier : CK          Goals  Short term goals  Time Frame for Short term goals: by d/c  Short term goal 1: Pt will complete bed mobility independently  Short term goal 2: Pt will transfer sit <> stand MOD I  Short term goal 3: Pt will ambulate 150' with LRAD and supervision  Patient Goals   Patient goals : Go to rehab to get stronger       Therapy Time   Individual Concurrent Group Co-treatment   Time In 0935         Time Out 1005         Minutes 30                 Kinsey Melendez PT    If pt d/c'd prior to next treatment, this note serves as a discharge note.

## 2018-07-30 NOTE — DISCHARGE INSTR - DIET

## 2018-07-30 NOTE — PROGRESS NOTES
4 Eyes Admission Assessment     I agree as the admission nurse that 2 RN's have performed a thorough Head to Toe Skin Assessment on the patient. ALL assessment sites listed below have been assessed on admission. Areas assessed by both nurses: Durga Gamboa and carlton  [x]   Head, Face, and Ears   [x]   Shoulders, Back, and Chest  [x]   Arms, Elbows, and Hands   [x]   Coccyx, Sacrum, and Ischum  [x]   Legs, Feet, and Heels *red blanchable area to toes*      Does the Patient have Skin Breakdown? No         Buzz Prevention initiated:  Yes   Wound Care Orders initiated:  N/A      WOC nurse consulted for Pressure Injury (Stage 3,4, Unstageable, DTI, NWPT, and Complex wounds):  NA      Nurse 1 eSignature: Electronically signed by Hannah Avila RN on 7/30/18 at 7:02 PM    **SHARE this note so that the co-signing nurse is able to place an eSignature**    Nurse 2 eSignature: {Esignature:[id number]}

## 2018-07-30 NOTE — CONSULTS
Patient: Norah Moore  [id number]  Date: 7/30/2018      Chief Complaint: Recent CVA with worsening right sided weakness     History of Present Illness/Hospital Course:  Mr. Charlene Avelar is a 70yo male who with PMH of recent CVA (7/23/18), HTN, HLD, DM II, Asthma, and BPH who presented to the ED after 7/29/18 with worsening right sided weakness after being discharged home from [facility] on 7/25 after sustaining an ischemic stroke that left him with mild right sided hemiparesis (UE > LE) and slight dysarthria. Pt was home for 3 days without issue, but early in the day 7/29 he developed worsening right sided weakness to the point that walk with the walker he was discharged with or move his right hand enough to take care of himself. CT head w/o yesterday after admission redemonstrated left melba infarct that was noted on MRI from 7/24, but no other acute pathology. Pt denies any lateralized numbness. Prior to stroke on 7/23/18 patient was highly active at home, living with his wife, without any disabilities or inability to perform ADLs. When discharged on 7/25/18 pt had some right sided weakness, but was able to ambulate with walker supplied by hospital.     PT/OT were consulted and evaluated Mr. Charlene Avelar with recommendation for 5/7 sessions of therapy per week with goal of increasing patient's independence. Mr. Charlene Avelar states that he is very motivated would like inpt rehabilitation to gain functional independence at home.        has a past medical history of Asthma; BPH; Cataract; Cerebral artery occlusion with cerebral infarction ([address] Depression; GERD (gastroesophageal reflux disease); Hyperlipidemia; Hypertension; and Type 2 diabetes mellitus ([address] has a past surgical history that includes Lumbar disc surgery; Nasal septum surgery; Spine surgery (2005); knee surgery (2014); eye surgery; Colonoscopy; and other surgical history (N/A, 12/28/2016). reports that he has never smoked.  He has never used smokeless tobacco. He reports that he drinks alcohol. He reports that he does not use drugs. family history includes Depression in his mother; Diabetes in his mother; Heart Disease in his father; High Blood Pressure in his father. REVIEW OF SYSTEMS:   CONSTITUTIONAL: negative for fevers, chills, diaphoresis, activity change, appetite change, fatigue, night sweats and unexpected weight change. EYES: negative for blurred vision, eye discharge, visual disturbance and icterus  HEENT: negative for hearing loss, tinnitus, ear drainage, sinus pressure, nasal congestion, epistaxis and snoring  RESPIRATORY: Negative for hemoptysis, cough, sputum production  CARDIOVASCULAR: negative for chest pain, palpitations, exertional chest pressure/discomfort, edema, syncope  GASTROINTESTINAL: negative for nausea, vomiting, diarrhea, constipation, blood in stool and abdominal pain  GENITOURINARY: negative for frequency, dysuria, urinary incontinence, decreased urine volume, and hematuria  HEMATOLOGIC/LYMPHATIC: negative for easy bruising, bleeding and lymphadenopathy  ALLERGIC/IMMUNOLOGIC: negative for recurrent infections, angioedema, anaphylaxis and drug reactions  ENDOCRINE: negative for weight changes and diabetic symptoms including polyuria, polydipsia and polyphagia  MUSCULOSKELETAL: negative for pain, joint swelling, decreased range of motion and muscle weakness  NEUROLOGICAL: Endorses slurred speech and right sided weakness (UE > LE). Negative for headaches, or numbness  PSYCHIATRIC/BEHAVIORAL: negative for hallucinations, behavioral problems, confusion and agitation.      Physical Examination:  Vitals: Patient Vitals for the past 24 hrs:   BP Temp Temp src Pulse Resp SpO2 Height   07/30/18 1454 (!) 159/67 98.7 °F (37.1 °C) Oral 75 16 93 % -   07/30/18 1056 (!) 163/77 98.5 °F (36.9 °C) Oral 82 16 97 % -   07/30/18 0800 (!) 145/70 97.2 °F (36.2 °C) Axillary 82 16 - -   07/30/18 0243 130/77 98.7 °F (37.1 °C) Oral 74 16 96 mass effect or edema.           Impression       Infarct left melba noted on previous MRI examination July 24, 2018.       No acute hemorrhage. Assessment:  1. Right hemiplegia 2/2 recent CVA   2. HTN   3. DM II        Recommendations: Will discuss with Bebe Hamman A. Charlet Pain, MD with plan to follow. Patient seen and examined in conjunction with resident; agree with above. Good candidate for ARU; can admit today.     Sarah Love, DO  Internal Medicine, PGY1  Pager: [phone]     7/30/2018, 4:23 PM

## 2018-07-30 NOTE — PROGRESS NOTES
unassisted = 0    Respiratory Pattern: Regular Pattern; RR 8-20 = 0    Breath Sounds: Clear = 0    Sputum   ,  ,    Cough: Strong, spontaneous, non-productive = 0    Vital Signs   /70   Pulse 74   Temp 98.7 °F (37.1 °C) (Oral)   Resp 18   Ht 6' 4\" (1.93 m)   SpO2 95%   SPO2 (COPD values may differ): Greater than or equal to 92% on room air = 0    Peak Flow (asthma only): not applicable = 0    RSI: 0-4 = See once and convert to home regimen or discontinue        Plan       Goals: medication delivery    Patient/caregiver was educated on the proper method of use for Respiratory Care Devices:  Yes      Level of patient/caregiver understanding able to:   [] Verbalize understanding   [] Demonstrate understanding       [] Teach back        [] Needs reinforcement       []  No available caregiver               []  Other:     Response to education:  Excellent     Is patient being placed on Home Treatment Regimen? Yes     Does the patient have everything they need prior to discharge? Yes     Comments: admits with cva    Plan of Care: mdi albuterol prn    Electronically signed by Hoang Willams RCP on 7/29/2018 at 9:25 PM    Respiratory Protocol Guidelines     1. Assessment and treatment by Respiratory Therapy will be initiated for medication and therapeutic interventions upon initiation of aerosolized medication. 2. Physician will be contacted for respiratory rate (RR) greater than 35 breaths per minute. Therapy will be held for heart rate (HR) greater than 140 beats per minute, pending direction from physician. 3. Bronchodilators will be administered via Metered Dose Inhaler (MDI) with spacer when the following criteria are met:  a. Alert and cooperative     b. HR < 140 bpm  c. RR < 30 bpm                d. Can demonstrate a 2-3 second inspiratory hold  4. Bronchodilators will be administered via Hand Held Nebulizer OPAL [facility]) to patients when ANY of the following criteria are met  a.  Incognizant or uncooperative

## 2018-07-30 NOTE — DISCHARGE SUMMARY
[facility]    Patient Demographics    Patient. Malena Ghotra  Date of Birth. 1950  MRN. [id number]     Primary care provider. Lian Dao MD  (Tel: [phone])    Admit date: 7/29/2018    Discharge date (blank if same as Note Date): Note Date: 7/30/2018     Reason for Hospitalization. Chief Complaint   Patient presents with    Extremity Weakness           Principal Problem:    Right hemiplegia (HCC)  Active Problems:    BPH (benign prostatic hyperplasia)    Type 2 diabetes mellitus (HCC)    Hyperlipidemia    Hypertension    Stenosis of right carotid artery    CVA, old, ataxia    Debility         Problems and results from this hospitalization that need follow up. 1. Above    Significant test results and incidental findings. 1. CT of the head shows no acute process; does show infarct left melba as noted previously on MRI    Invasive procedures and treatments. 1. None     Hospital Course. Patient made it to the hospital after doing poorly at home. Patient was recently in the hospital found to have a stroke. Was discharged home but patient reports he declined. He was unable to do the activity that he was doing prior to discharge. He was unstable. His wife could not continue to assist him. He was brought back to the emergency room and admitted to the hospital.  Rehab has evaluated him and has accepted him for transfer to the rehab unit. Patient appears medically stable. Patient will be discharged to rehab today    Condition at discharge:  Stable    Consults. IP CONSULT TO HOSPITALIST    Physical examination on discharge day. BP (!) 163/77   Pulse 82   Temp 98.5 °F (36.9 °C) (Oral)   Resp 16   Ht 6' 4\" (1.93 m)   SpO2 97%   General appearance. Alert. Looks comfortable. HEENT. Moist mucus membranes. Cardiovascular. Regular rate and rhythm, normal S1, S2. No murmur.

## 2018-07-31 LAB
A/G RATIO: 1.1 (ref 1.1–2.2)
ALBUMIN SERPL-MCNC: 3.9 G/DL (ref 3.4–5)
ALP BLD-CCNC: 59 U/L (ref 40–129)
ALT SERPL-CCNC: 23 U/L (ref 10–40)
ANION GAP SERPL CALCULATED.3IONS-SCNC: 10 MMOL/L (ref 3–16)
AST SERPL-CCNC: 21 U/L (ref 15–37)
BILIRUB SERPL-MCNC: 0.7 MG/DL (ref 0–1)
BUN BLDV-MCNC: 19 MG/DL (ref 7–20)
CALCIUM SERPL-MCNC: 10 MG/DL (ref 8.3–10.6)
CHLORIDE BLD-SCNC: 101 MMOL/L (ref 99–110)
CO2: 28 MMOL/L (ref 21–32)
CREAT SERPL-MCNC: 0.7 MG/DL (ref 0.8–1.3)
GFR AFRICAN AMERICAN: >60
GFR NON-AFRICAN AMERICAN: >60
GLOBULIN: 3.6 G/DL
GLUCOSE BLD-MCNC: 111 MG/DL (ref 70–99)
HCT VFR BLD CALC: 40.9 % (ref 40.5–52.5)
HEMOGLOBIN: 13.6 G/DL (ref 13.5–17.5)
MCH RBC QN AUTO: 29.6 PG (ref 26–34)
MCHC RBC AUTO-ENTMCNC: 33.3 G/DL (ref 31–36)
MCV RBC AUTO: 88.9 FL (ref 80–100)
PDW BLD-RTO: 13.3 % (ref 12.4–15.4)
PLATELET # BLD: 186 K/UL (ref 135–450)
PMV BLD AUTO: 8.2 FL (ref 5–10.5)
POTASSIUM REFLEX MAGNESIUM: 3.8 MMOL/L (ref 3.5–5.1)
RBC # BLD: 4.6 M/UL (ref 4.2–5.9)
SODIUM BLD-SCNC: 139 MMOL/L (ref 136–145)
TOTAL PROTEIN: 7.5 G/DL (ref 6.4–8.2)
WBC # BLD: 7.7 K/UL (ref 4–11)

## 2018-07-31 PROCEDURE — 97530 THERAPEUTIC ACTIVITIES: CPT | Performed by: PHYSICAL THERAPIST

## 2018-07-31 PROCEDURE — 1280000000 HC REHAB R&B

## 2018-07-31 PROCEDURE — 85027 COMPLETE CBC AUTOMATED: CPT

## 2018-07-31 PROCEDURE — 97116 GAIT TRAINING THERAPY: CPT | Performed by: PHYSICAL THERAPIST

## 2018-07-31 PROCEDURE — 92610 EVALUATE SWALLOWING FUNCTION: CPT

## 2018-07-31 PROCEDURE — 6360000002 HC RX W HCPCS: Performed by: PHYSICAL MEDICINE & REHABILITATION

## 2018-07-31 PROCEDURE — 3E0U33Z INTRODUCTION OF ANTI-INFLAMMATORY INTO JOINTS, PERCUTANEOUS APPROACH: ICD-10-PCS | Performed by: PHYSICAL MEDICINE & REHABILITATION

## 2018-07-31 PROCEDURE — 6370000000 HC RX 637 (ALT 250 FOR IP): Performed by: PHYSICAL MEDICINE & REHABILITATION

## 2018-07-31 PROCEDURE — 97535 SELF CARE MNGMENT TRAINING: CPT

## 2018-07-31 PROCEDURE — 92526 ORAL FUNCTION THERAPY: CPT

## 2018-07-31 PROCEDURE — G8999 MOTOR SPEECH CURRENT STATUS: HCPCS

## 2018-07-31 PROCEDURE — 2500000003 HC RX 250 WO HCPCS

## 2018-07-31 PROCEDURE — 97162 PT EVAL MOD COMPLEX 30 MIN: CPT | Performed by: PHYSICAL THERAPIST

## 2018-07-31 PROCEDURE — 2500000003 HC RX 250 WO HCPCS: Performed by: PHYSICAL MEDICINE & REHABILITATION

## 2018-07-31 PROCEDURE — 92523 SPEECH SOUND LANG COMPREHEN: CPT

## 2018-07-31 PROCEDURE — 97166 OT EVAL MOD COMPLEX 45 MIN: CPT

## 2018-07-31 PROCEDURE — G9186 MOTOR SPEECH GOAL STATUS: HCPCS

## 2018-07-31 PROCEDURE — 36415 COLL VENOUS BLD VENIPUNCTURE: CPT

## 2018-07-31 PROCEDURE — 80053 COMPREHEN METABOLIC PANEL: CPT

## 2018-07-31 RX ORDER — LOSARTAN POTASSIUM 100 MG/1
100 TABLET ORAL DAILY
Status: DISCONTINUED | OUTPATIENT
Start: 2018-08-01 | End: 2018-08-14 | Stop reason: HOSPADM

## 2018-07-31 RX ORDER — HYDROCHLOROTHIAZIDE 25 MG/1
25 TABLET ORAL DAILY
Status: DISCONTINUED | OUTPATIENT
Start: 2018-08-01 | End: 2018-08-14 | Stop reason: HOSPADM

## 2018-07-31 RX ORDER — LIDOCAINE HYDROCHLORIDE 10 MG/ML
5 INJECTION, SOLUTION EPIDURAL; INFILTRATION; INTRACAUDAL; PERINEURAL ONCE
Status: COMPLETED | OUTPATIENT
Start: 2018-07-31 | End: 2018-07-31

## 2018-07-31 RX ORDER — TRIAMCINOLONE ACETONIDE 40 MG/ML
80 INJECTION, SUSPENSION INTRA-ARTICULAR; INTRAMUSCULAR ONCE
Status: COMPLETED | OUTPATIENT
Start: 2018-07-31 | End: 2018-07-31

## 2018-07-31 RX ORDER — BACLOFEN 10 MG/1
10 TABLET ORAL NIGHTLY
Status: DISCONTINUED | OUTPATIENT
Start: 2018-07-31 | End: 2018-08-14 | Stop reason: HOSPADM

## 2018-07-31 RX ORDER — LIDOCAINE HYDROCHLORIDE 10 MG/ML
INJECTION, SOLUTION EPIDURAL; INFILTRATION; INTRACAUDAL; PERINEURAL
Status: COMPLETED
Start: 2018-07-31 | End: 2018-07-31

## 2018-07-31 RX ADMIN — METHOCARBAMOL 750 MG: 750 TABLET ORAL at 21:12

## 2018-07-31 RX ADMIN — METHOCARBAMOL 750 MG: 750 TABLET ORAL at 09:50

## 2018-07-31 RX ADMIN — METHOCARBAMOL 750 MG: 750 TABLET ORAL at 13:07

## 2018-07-31 RX ADMIN — ASPIRIN 81 MG: 81 TABLET, COATED ORAL at 09:49

## 2018-07-31 RX ADMIN — ACETAMINOPHEN 650 MG: 325 TABLET, FILM COATED ORAL at 11:57

## 2018-07-31 RX ADMIN — STANDARDIZED SENNA CONCENTRATE AND DOCUSATE SODIUM 2 TABLET: 8.6; 5 TABLET, FILM COATED ORAL at 21:12

## 2018-07-31 RX ADMIN — BACLOFEN 10 MG: 10 TABLET ORAL at 21:12

## 2018-07-31 RX ADMIN — GUAIFENESIN 1200 MG: 600 TABLET, EXTENDED RELEASE ORAL at 09:48

## 2018-07-31 RX ADMIN — LIDOCAINE HYDROCHLORIDE 5 ML: 10 INJECTION, SOLUTION EPIDURAL; INFILTRATION; INTRACAUDAL; PERINEURAL at 14:00

## 2018-07-31 RX ADMIN — TAMSULOSIN HYDROCHLORIDE 0.4 MG: 0.4 CAPSULE ORAL at 21:13

## 2018-07-31 RX ADMIN — HYDROCHLOROTHIAZIDE 12.5 MG: 25 TABLET ORAL at 09:49

## 2018-07-31 RX ADMIN — TRIAMCINOLONE ACETONIDE 80 MG: 40 INJECTION, SUSPENSION INTRA-ARTICULAR; INTRAMUSCULAR at 14:00

## 2018-07-31 RX ADMIN — AMLODIPINE BESYLATE 10 MG: 10 TABLET ORAL at 09:49

## 2018-07-31 RX ADMIN — GLIPIZIDE 5 MG: 5 TABLET ORAL at 09:50

## 2018-07-31 RX ADMIN — ENOXAPARIN SODIUM 40 MG: 40 INJECTION SUBCUTANEOUS at 09:47

## 2018-07-31 RX ADMIN — LIDOCAINE HYDROCHLORIDE: 10 INJECTION, SOLUTION EPIDURAL; INFILTRATION; INTRACAUDAL; PERINEURAL at 14:00

## 2018-07-31 RX ADMIN — PIOGLITAZONE 15 MG: 15 TABLET ORAL at 09:54

## 2018-07-31 RX ADMIN — LOSARTAN POTASSIUM 100 MG: 100 TABLET ORAL at 09:50

## 2018-07-31 RX ADMIN — ATORVASTATIN CALCIUM 80 MG: 80 TABLET, FILM COATED ORAL at 21:12

## 2018-07-31 RX ADMIN — METFORMIN HYDROCHLORIDE 2000 MG: 500 TABLET, EXTENDED RELEASE ORAL at 16:52

## 2018-07-31 ASSESSMENT — PAIN DESCRIPTION - DESCRIPTORS
DESCRIPTORS: ACHING
DESCRIPTORS: DULL
DESCRIPTORS: DULL
DESCRIPTORS: ACHING;DULL
DESCRIPTORS: DULL

## 2018-07-31 ASSESSMENT — PAIN DESCRIPTION - PAIN TYPE
TYPE: ACUTE PAIN

## 2018-07-31 ASSESSMENT — PAIN DESCRIPTION - PROGRESSION
CLINICAL_PROGRESSION: GRADUALLY WORSENING
CLINICAL_PROGRESSION: NOT CHANGED
CLINICAL_PROGRESSION: GRADUALLY WORSENING
CLINICAL_PROGRESSION: NOT CHANGED
CLINICAL_PROGRESSION: NOT CHANGED

## 2018-07-31 ASSESSMENT — PAIN DESCRIPTION - FREQUENCY
FREQUENCY: CONTINUOUS
FREQUENCY: INTERMITTENT
FREQUENCY: INTERMITTENT
FREQUENCY: CONTINUOUS
FREQUENCY: CONTINUOUS

## 2018-07-31 ASSESSMENT — PAIN DESCRIPTION - LOCATION
LOCATION: KNEE
LOCATION: BACK
LOCATION: KNEE

## 2018-07-31 ASSESSMENT — PAIN DESCRIPTION - ONSET
ONSET: PROGRESSIVE
ONSET: OTHER (COMMENT)
ONSET: ON-GOING
ONSET: PROGRESSIVE

## 2018-07-31 ASSESSMENT — PAIN SCALES - GENERAL
PAINLEVEL_OUTOF10: 8
PAINLEVEL_OUTOF10: 8
PAINLEVEL_OUTOF10: 3
PAINLEVEL_OUTOF10: 3
PAINLEVEL_OUTOF10: 6
PAINLEVEL_OUTOF10: 0
PAINLEVEL_OUTOF10: 0

## 2018-07-31 ASSESSMENT — PAIN DESCRIPTION - ORIENTATION
ORIENTATION: RIGHT;MID
ORIENTATION: RIGHT

## 2018-07-31 NOTE — PROGRESS NOTES
Motor: Exceptions to [facility]  Labial ROM: Reduced right  Labial Symmetry: Abnormal symmetry right  Lingual ROM: Reduced right  Lingual Symmetry: Abnormal symmetry right (tongue deviates to R)    Auditory Comprehension  Comprehension: Within Functional Limits (complex yes/no questions: 100%)    Expression  Primary Mode of Expression: Verbal    Verbal Expression  Verbal Expression: Within functional limits    Written Expression  Dominant Hand: Left  Written Expression: To be tested in therapy sessions as needed. Motor Speech  Motor Speech: Exceptions to [facility]  Intelligibility: Mild  Dysarthria : Mild (>/= 90% intelligibile in conversation; has difficulty with multi-syllabic words and slurrs speech as he progressively talks more)    Pragmatics/Social Functioning  Pragmatics: Within functional limits    Cognition:      Orientation  Overall Orientation Status: Within Normal Limits    Attention  Attention: Exceptions to [facility] (pt reports he has attention deficis that affect his ability to complete tasks)  Divided Attention: Mild  Selective Attention: Mild    Memory  Memory: Exceptions to [facility]  Daily Routines: To be assessed in therapy  Long-term Memory: To be assessed in therapy  People Encountered: To be assessed in therapy  Prospective Memory: To be assessed in therapy  Short-term Memory: Moderate  Working Memory: Moderate    Problem Solving  Problem Solving: Exceptions to [facility]  Complex Functional Tasks: Mild  Managing Finances: To be assessed in therapy  Managing Medications: To be assessed in therapy  Performing Discharge Planning: To be assessed in therapy  Simple Functional Tasks: To be assessed in therapy  Verbal Reasoning Skills: Mild    Numeric Reasoning  Numeric Reasoning: Exceptions to [facility]   Money Management: To be assessed in therapy  Time: To be assessed in therapy    Abstract Reasoning  Abstract Reasoning: Exceptions to [facility]  Convergent Thinking: To be assessed in therapy  Divergent Thinking:  To be assessed in

## 2018-07-31 NOTE — PROGRESS NOTES
Occupational Therapy   Occupational Therapy Initial Assessment/Daily Treatment Note  Date: 2018   Patient Name: Dotty Phoenix  MRN: [id number]     : 1950    Date of Service: 2018    Discharge Recommendations:  Outpatient OT, Home with assist PRN  OT Equipment Recommendations  Equipment Needed: Yes  Other: shower chair - TBD      Patient Diagnosis(es): There were no encounter diagnoses. has a past medical history of Asthma; BPH; Cataract; Cerebral artery occlusion with cerebral infarction ([facility] [address] Depression; GERD (gastroesophageal reflux disease); Hyperlipidemia; Hypertension; and Type 2 diabetes mellitus ([facility] [address] has a past surgical history that includes Lumbar disc surgery; Nasal septum surgery; Spine surgery (); knee surgery (); eye surgery; Colonoscopy; and other surgical history (N/A, 2016). Treatment Diagnosis: Impaired ADLs/transfers and functional mobility, decrease RUE AROM, strength and coordination      Restrictions  Position Activity Restriction  Other position/activity restrictions: Up with assist    Subjective   General  Chart Reviewed: Yes  Additional Pertinent Hx: 70 y.o. M admitted  with right sided weakness and facial droop. Head CT= Infarct left melba from scan on , no acute hemorrhage. CXR (-) for acute findings. Recent admit ~6 days prior for acute CVA, was d/c'ed home. PMHx= BPH, cataract, cerebral artery occlusion, depression, HTN, DM.  Pt admitted to ARU on   Family / Caregiver Present: No  Referring Practitioner: Dr. Charo Prado  Diagnosis: CVA  Subjective  Subjective: Pt up in chair on arrival, agreeable to OT evaluation  Pain Assessment  Patient Currently in Pain: Yes  Pain Assessment: 0-10  Pain Level: 8  Pain Type: Acute pain  Pain Location: Knee  Pain Orientation: Right  Pain Descriptors: Dull  Pain Frequency: Continuous  Clinical Progression: Not changed  Patient's Stated Pain Goal: No pain  Effect of Pain on Daily Activities: pt not lifting foot as well as morning therapy session  Pain Intervention(s):  (pt reported had Tylenol. MD in after session to give injection)  Response to Pain Intervention: Patient Satisfied     Social/Functional History  Social/Functional History  Lives With: Spouse  Type of Home: House  Home Layout: Two level, Able to Live on Main level with bedroom/bathroom  Home Access: Stairs to enter without rails  Entrance Stairs - Number of Steps: 3  Bathroom Shower/Tub: Tub/Shower unit (tried various shower chairs, however, tub is too narrow)  Bathroom Toilet: Standard  Home Equipment: Rolling walker, Cane  ADL Assistance: Independent  Homemaking Assistance: Independent  Ambulation Assistance: Independent  Transfer Assistance: Independent  Active : Yes  Mode of Transportation: Car  Education: college  Occupation: Retired  Type of occupation: Worked in [address] [place] [address] Road: Enjoys playing with 4 grandchildren, and playing with electronics, golfing  Additional Comments: Pt states that prior to CVA ~1week ago he was completely independent. Since then he has been at home getting around with RW and needing assistance for all mobility and ambulation. Pt states he has had multiple near falls, and feels like his symptoms are worsening. Objective   Vision: Impaired  Vision Exceptions: Wears glasses for reading  Hearing: Within functional limits    Orientation  Overall Orientation Status: Within Functional Limits     Balance  Standing Balance: Moderate assistance - min A  Standing Balance  Sit to stand: Moderate assistance - min A  Stand to sit: Moderate assistance - min A  **Posterior lean requiring mod assist to maintain balance during dynamic activity**    Functional Mobility  Functional - Mobility Device: Rolling Walker  Activity: To/from bathroom  Assist Level:  Moderate assistance  **Fatigued from PT session requiring increase assist**    Toilet Transfers  Toilet - Technique: Ambulating  Equipment Used: Standard independence with ADLs, IADLs, functional mobility and community mobility. Pt to benefit from skilled OT in IRF setting to address above impairments to facilitate return to PLOF which was independent. Treatment Diagnosis: Impaired ADLs/transfers and functional mobility, decrease RUE AROM, strength and coordination  Prognosis: Good  Decision Making: Medium Complexity  Patient Education: role of OT in ARU setting - verb understanding  REQUIRES OT FOLLOW UP: Yes  Activity Tolerance  Activity Tolerance: Patient Tolerated treatment well  Safety Devices  Safety Devices in place: Not Applicable (left EOB with MD present to give injection. RN made aware and on the way to patient's room)         Plan   Plan  Times per week: 5x per week x75 minutes  Times per day: Daily  Plan weeks: 2-3 weeks  Current Treatment Recommendations: Strengthening, ROM, Balance Training, Functional Mobility Training, Safety Education & Training, Pain Management, Endurance Training, Neuromuscular Re-education, Patient/Caregiver Education & Training, Equipment Evaluation, Education, & procurement, Modalities (comment), Manual Therapy:  STM, Home Management Training, Self-Care / ADL, Cognitive/Perceptual Training    Goals  Short term goals  Time Frame for Short term goals: 2 weeks  Short term goal 1: Pt will be min A with toilet transfer to demo improved standing balance and R side strength  Short term goal 2: Pt will be CGA for toileting to demo improved dynamic standing balance and functional use of R hand  Short term goal 3: Pt will min A for LE dressing to demo improved use of yogesh-technique strategies.   Short term goal 4: Pt will be SBA/SPV for UE dressing to demo improved use of yogesh-technique strategies and functional use of RUE  Short term goal 5: Pt will be SBA for grooming routine in stance to demo improved dynamic standing balance/tolerance  Long term goals  Time Frame for Long term goals : 3 weeks  Long term goal 1: Pt will be MOD I with

## 2018-07-31 NOTE — PROGRESS NOTES
Pattern: Regular Pattern; RR 8-20 = 0    Breath Sounds: Clear = 0    Sputum   ,  ,    Cough: Strong, spontaneous, non-productive = 0    Vital Signs   BP (!) 149/76   Pulse 73   Temp 97.5 °F (36.4 °C) (Oral)   Resp 16   SpO2 96%   SPO2 (COPD values may differ): Greater than or equal to 92% on room air = 0    Peak Flow (asthma only): not applicable = 0    RSI: 0-4 = See once and convert to home regimen or discontinue        Plan       Goals: medication delivery and improve oxygenation    Patient/caregiver was educated on the proper method of use for Respiratory Care Devices:  Yes      Level of patient/caregiver understanding able to:   [] Verbalize understanding   [] Demonstrate understanding       [] Teach back        [] Needs reinforcement       []  No available caregiver               []  Other:     Response to education:  Excellent     Is patient being placed on Home Treatment Regimen? Yes     Does the patient have everything they need prior to discharge? Yes     Comments: admits for cva    Plan of Care: mdi albuterol prn    Electronically signed by Catherine Sebastian RCP on 7/31/2018 at 2:31 AM    Respiratory Protocol Guidelines     1. Assessment and treatment by Respiratory Therapy will be initiated for medication and therapeutic interventions upon initiation of aerosolized medication. 2. Physician will be contacted for respiratory rate (RR) greater than 35 breaths per minute. Therapy will be held for heart rate (HR) greater than 140 beats per minute, pending direction from physician. 3. Bronchodilators will be administered via Metered Dose Inhaler (MDI) with spacer when the following criteria are met:  a. Alert and cooperative     b. HR < 140 bpm  c. RR < 30 bpm                d. Can demonstrate a 2-3 second inspiratory hold  4. Bronchodilators will be administered via Hand Held Nebulizer [facility]) to patients when ANY of the following criteria are met  a. Incognizant or uncooperative          b.  Patients treated with HHN at Home        c. Unable to demonstrate proper use of MDI with spacer     d. RR > 30 bpm   5. Bronchodilators will be delivered via Metered Dose Inhaler (MDI), HHN, Aerogen to intubated patients on mechanical ventilation. 6. Inhalation medication orders will be delivered and/or substituted as outlined below. Aerosolized Medications Ordering and Administration Guidelines:    1. All Medications will be ordered by a physician, and their frequency and/or modality will be adjusted as defined by the patients Respiratory Severity Index (RSI) score. 2. If the patient does not have documented COPD, consider discontinuing anticholinergics when RSI is less than 9.  3. If the bronchospasm worsens (increased RSI), then the bronchodilator frequency can be increased to a maximum of every 4 hours. If greater than every 4 hours is required, the physician will be contacted. 4. If the bronchospasm improves, the frequency of the bronchodilator can be decreased, based on the patient's RSI, but not less than home treatment regimen frequency. 5. Bronchodilator(s) will be discontinued if patient has a RSI less than 9 and has received no scheduled or as needed treatment for 72  Hrs. Patients Ordered on a Mucolytic Agent:    1. Must always be administered with a bronchodilator. 2. Discontinue if patient experiences worsened bronchospasm, or secretions have lessened to the point that the patient is able to clear them with a cough. Anti-inflammatory and Combination Medications:    1. If the patient lacks prior history of lung disease, is not using inhaled anti-inflammatory medication at home, and lacks wheezing by examination or by history for at least 24 hours, contact physician for possible discontinuation.

## 2018-07-31 NOTE — PROGRESS NOTES
Speech Language Pathology  Facility/Department: [facility] ACUTE REHAB UNIT   BEDSIDE SWALLOW EVALUATION    NAME: Anni Chan  : 1950  MRN: [id number]    ADMISSION DATE: 2018  ADMITTING DIAGNOSIS: has Depression; Allergic rhinitis; BPH (benign prostatic hyperplasia); Type 2 diabetes mellitus ([facility] [address] Hyperlipidemia; Hypertension; Neuropathy in diabetes ([facility] [address] Asthma; Memory difficulties; Nasal obstruction; Hypertrophy of nasal turbinates; Atherosclerotic PVD with intermittent claudication ([address] Chronic venous hypertension with inflammation involving both sides; Acute CVA (cerebrovascular accident) ([facility] [address] Stenosis of right carotid artery; Stroke of unknown etiology ([facility] [address] CVA, old, ataxia; Debility; Right hemiplegia ([facility] [address] and Acute ischemic stroke [facility]) on his problem list.  ONSET DATE: 18    Recent Chest Xray: 18  Impression       No consolidation     MBS 18:  \"Pt demonstrates adequate oral stage of swallow. Pt demonstrates transient penetration with thin liquids (via cup and via straw) that clears with the swallow. He has no pharyngeal or laryngeal residue. There is no penetration with nectar consistency liquids. Pt deemed appropriate for regular diet with thin liquids with adherence to General Aspiration Precautions (Upright position for all PO, Eat slowly, Small bites/sips). \"    Date of Eval: 2018  Evaluating Therapist: Lea Kuhn    Current Diet level:  Current Diet : Regular  Current Liquid Diet : Thin    Primary Complaint  Patient Complaint: pt previously reported minor anterior spillage from R side; not occurring now. pt also reported he was having some difficulty when he ate fast, so he has forced himself to slow down when eating.     Pain:  Pain Assessment  Patient Currently in Pain: No    Reason for Referral  Anni Chan was referred for a bedside swallow evaluation to assess the efficiency of his swallow function, identify signs and symptoms of aspiration and right  Lingual Symmetry: Abnormal symmetry right    Oral Phase Dysfunction  Oral Phase  Oral Phase: Exceptions  Oral Phase Dysfunction  Spillage Right:  (pt reported previously having minor spillage from R side; however, none observed during evaluation/meal)  Lingual/Palatal Residue:  (minimal with regular/soft solids; clears with second swallow or liquid rinse)    Oral Phase - Comment: pt presents with mild oral dysphagia characterized by increased time for bolus formation/transport. minimal oral residue noted; but pt able to clear with second swallow or liquid rinse. noted with use of lingual sweep independently and chewing on \"strong\" side     Indicators of Pharyngeal Phase Dysfunction   Pharyngeal Phase  Pharyngeal Phase: Exceptions  Indicators of Pharyngeal Phase Dysfunction  Delayed Swallow:  (delayed dry swallow but recent MBS shows timely swallow response with PO trials)  Decreased Laryngeal Elevation:  (mildly decreased upon palpation)  Throat Clearing - Immediate: All (pt reports this is a habit for him; does not appear to be related to penetration/aspiration)  Pharyngeal: Pt presents with mild pharyngeal dysphagia characterized by mild delay in trigger of swallow (dry swallow). Pt clears throat often during meals but also does this when not eating and reports it is habitual.     Prognosis  Prognosis  Prognosis for safe diet advancement: good  Individuals consulted  Consulted and agree with results and recommendations: Patient    Education  Patient Education: Reviewed results of evaluation, recommendations, nature of dysphagia and possible medical complications related to swallowing impairments, swallow strategies/precautions  Patient Education Response: Verbalizes understanding    G-Code  SLP G-Codes  Functional Limitations: Motor speech  Motor Speech Current Status (): At least 1 percent but less than 20 percent impaired, limited or restricted  Motor Speech Goal Status ():  At least 1 percent

## 2018-07-31 NOTE — PROGRESS NOTES
Physical Therapy    Facility/Department: [facility] ACUTE REHAB UNIT  Initial Assessment/Daily treatment note    NAME: Malena Ghotra  : 1950  MRN: [id number]    Date of Service: 2018    Discharge Recommendations:  Home with Home health PT, Home with assist PRN   PT Equipment Recommendations  Equipment Needed: No (Ongoing assessment)    Patient Diagnosis(es): There were no encounter diagnoses. has a past medical history of Asthma; BPH; Cataract; Cerebral artery occlusion with cerebral infarction ([address] Depression; GERD (gastroesophageal reflux disease); Hyperlipidemia; Hypertension; and Type 2 diabetes mellitus ([address] has a past surgical history that includes Lumbar disc surgery; Nasal septum surgery; Spine surgery (); knee surgery (); eye surgery; Colonoscopy; and other surgical history (N/A, 2016). Restrictions  Position Activity Restriction  Other position/activity restrictions: Up with assist  Vision/Hearing  Vision: Impaired  Vision Exceptions: Wears glasses for reading  Hearing: Within functional limits     Subjective  General  Chart Reviewed: Yes  Additional Pertinent Hx: Pt is a 71 yo male who presented to the ED on  with R sided weakness. Patient was discharged from the hospital 6 days ago after being admitted following acute CVA with residual right-sided weakness, slurring of his speech and right-sided facial droop. Family / Caregiver Present: Yes (At the end of session, pt's wife was present )  Referring Practitioner: Dr. Oriana Mcdonald  Diagnosis: CVA with R hemiparesis  Follows Commands: Within Functional Limits  General Comment  Comments: Pt was sitting in BS chair upon arrival with nsg finishing up with removal of IV. Pt agreeable to PT intervention.    Pain Screening  Patient Currently in Pain: Yes  Pain Assessment  Pain Level: 6  Pain Type: Acute pain  Pain Location: Knee  Pain Orientation: Right  Pain Descriptors: Aching;Dull  Pain Frequency: Continuous  Pain Onset: Treatment Diagnosis: Difficulty with functional mobility 2/2 CVA  Prognosis: Excellent  Decision Making: Medium Complexity  Patient Education: PT instructed pt with role of PT, bed mobility skills, transf training, gt training and w/c mobility skills . Pt verbalized understanding but will need significant reinforcement   REQUIRES PT FOLLOW UP: Yes  Activity Tolerance  Activity Tolerance: Patient limited by fatigue;Patient limited by endurance         Plan   Plan  Times per week: 5 out of 7 days x 60 minutes   Times per day: Daily  Current Treatment Recommendations: Strengthening, Balance Training, Endurance Training, Functional Mobility Training, Transfer Training, Gait Training, Stair training, Patient/Caregiver Education & Training, Neuromuscular Re-education  Safety Devices  Type of devices: Left in chair, Call light within reach, Nurse notified, Chair alarm in place    G-Code     OutComes Score                                           AM-PAC Score             Goals  Short term goals  Time Frame for Short term goals: 10 days   Short term goal 1: MI with all bed mobility skills  Short term goal 2: Pt will transfer sit <> stand  with Mod I  Short term goal 3: Pt will ambulate 150' with LRAD and supervision on level surfaces  Long term goals  Time Frame for Long term goals : 3 weeks   Long term goal 1: Ind with all bed mobility skills  Long term goal 2: MI with amb on level surfaces > nj=804' at a time  Long term goal 3: MI with amb up and down 12 steps with use of a hand rail   Patient Goals   Patient goals : To get both the Rarm annd leg stronger. (especially the R arm to assist with maneuvering the RW more effectively       Therapy Time   Individual Concurrent Group Co-treatment   Time In 1005         Time Out 1115         Minutes 70         Timed Code Treatment Minutes: [address]upe Flores, PT

## 2018-07-31 NOTE — H&P
for blurred vision, eye discharge, visual disturbance and icterus. HEENT: negative for hearing loss, tinnitus, ear drainage, sinus pressure, nasal congestion, epistaxis and snoring. RESPIRATORY: Negative for hemoptysis, cough, sputum production. CARDIOVASCULAR: negative for chest pain, palpitations, exertional chest pressure/discomfort, edema, syncope. GASTROINTESTINAL: negative for nausea, vomiting, diarrhea, constipation, blood in stool and abdominal pain. GENITOURINARY: negative for frequency, dysuria, urinary incontinence, decreased urine volume, and hematuria. HEMATOLOGIC/LYMPHATIC: negative for easy bruising, bleeding and lymphadenopathy. ALLERGIC/IMMUNOLOGIC: negative for recurrent infections, angioedema, anaphylaxis and drug reactions. ENDOCRINE: negative for weight changes and diabetic symptoms including polyuria, polydipsia and polyphagia. MUSCULOSKELETAL: negative for pain, joint swelling, decreased range of motion and muscle weakness. NEUROLOGICAL: negative for headaches, slurred speech, unilateral weakness. PSYCHIATRIC/BEHAVIORAL: negative for hallucinations, behavioral problems, confusion and agitation. All pertinent positives are noted in the HPI. Physical Examination:  Vitals: Patient Vitals for the past 24 hrs:   BP Temp Temp src Pulse Resp SpO2 Height Weight   07/31/18 0737 (!) 159/78 98.2 °F (36.8 °C) Oral 80 16 97 % - -   07/31/18 0409 - - - - - - 6' 4\" (1.93 m) 246 lb 7.6 oz (111.8 kg)   07/30/18 2228 (!) 149/76 97.5 °F (36.4 °C) Oral 73 16 96 % - -     Psych: Stable mood, normal judgement, normal affect   Const: No distress  Eyes: Conjunctiva noninjected, no icterus noted; pupils equal, round, and reactive to light. HENT: Atraumatic, normocephalic; Oral mucosa moist  Neck: Trachea midline, neck supple. No thyromegaly noted.   CV: Regular rate and rhythm, no murmur rub or gallop noted  Resp: Lungs clear to auscultation bilaterally, no rales wheezes or ronchi, no minutes of therapy a day,  5 out of 7 days a week. The patient/family has a good understanding of our discharge process. The  patient has potential to make improvement and is in need of at least two of  the following multidisciplinary therapies including but not limited to  physical, occupational, respiratory, and speech, nutritional services, wound care, and prosthetics and orthotics. Given the patients complex condition  and risk of further medical complications, rehabilitation services cannot be  safely provided at a lower level of care such as a skilled nursing facility. I have compared the patients medical and functional status at the time of the  preadmission screening and the same on this date, and there are no significant changes. By signing this document, I acknowledge that I have personally performed a  full physical examination on this patient within 24 hours of admission to  this inpatient rehabilitation facility and have determined the patient to be  able to tolerate the above course of treatment at an intensive level for a  reasonable period of time. I will be completing a detailed individualized  Plan of Care for this patient by day four of the patients stay based upon the  Preadmission Screen, this Post-Admission Evaluation, and the therapy  evaluations. Rehabilitation Diagnosis:  Stroke, 1.2, Right Body (L Brain)    Assessment and Plan:  Acute left pontine CVA with right hemiplegia. PT/OT/SLP. Secondary prevention with aspirin, statin, BP control. HTN. Norvasc, cozaar, hctz. Follow BP. Not at goal; increase regimen. DM. Actos, glipizide metformin; follow blood sugars. Urinary retention. Flomax. Bowels: Schedule colace + senna. Follow bowel movements. Enema or suppository if needed. Bladder: Check PVR x 3. [address] if PVR > 200ml or if any volume is > 500 ml. Sleep: Trazodone provided prn. Addendum: Patient complained of R knee pain and requested steroid injection.

## 2018-07-31 NOTE — PROGRESS NOTES
Nutrition Assessment    Type and Reason for Visit: Consult, Initial    Nutrition Recommendations:   1. PO Diet: Continue current diet Cardiac diet   2. Monitor, record and encourage PO intake at all meals through admission. Malnutrition Assessment:  · Malnutrition Status: No malnutrition  · Context: Acute illness or injury    Nutrition Diagnosis:   · Problem: No nutrition diagnosis at this time  · Etiology: related to       Signs and symptoms:  as evidenced by      Nutrition Assessment:  · Subjective Assessment: RD consult for new ARU. Acute left pontine CVA with right hemiplegia. Currently on cardiac diet. Po intake through admission so far % of meals. · Nutrition-Focused Physical Findings: lbm 7/29  · Wound Type: None  · Current Nutrition Therapies:  · Oral Diet Orders: Cardiac   · Oral Diet intake: %  · Oral Nutrition Supplement (ONS) Orders: None  · ONS intake: NPO  · Anthropometric Measures:  · Ht: 6' 4\" (193 cm)   · Current Body Wt: 246 lb 7.6 oz (111.8 kg)  · BMI Classification: BMI 30.0 - 34.9 Obese Class I    Estimated Intake vs Estimated Needs: Intake Meets Needs    Nutrition Risk Level: Low    Nutrition Interventions:   Continue current diet  Continued Inpatient Monitoring    Nutrition Evaluation:   · Evaluation: Goals set   · Goals: pt will tolerate diet to consume greater than 75% of meals offered through admission    · Monitoring: Meal Intake    See Adult Nutrition Doc Flowsheet for more detail.      Electronically signed by Jossue Ibrahim RD, DENISE on 7/31/18 at 4:32 PM    Contact Number: [phone]

## 2018-07-31 NOTE — PROGRESS NOTES
Pt still c/o of R knee pain, tylenol did not help. Dr. Susie Ba in to see patient and stated they would try a knee injection and a dose of baclofen at HS.

## 2018-08-01 PROCEDURE — 97535 SELF CARE MNGMENT TRAINING: CPT

## 2018-08-01 PROCEDURE — 6360000002 HC RX W HCPCS: Performed by: PHYSICAL MEDICINE & REHABILITATION

## 2018-08-01 PROCEDURE — 1280000000 HC REHAB R&B

## 2018-08-01 PROCEDURE — 97110 THERAPEUTIC EXERCISES: CPT

## 2018-08-01 PROCEDURE — 94799 UNLISTED PULMONARY SVC/PX: CPT

## 2018-08-01 PROCEDURE — 6370000000 HC RX 637 (ALT 250 FOR IP): Performed by: PHYSICAL MEDICINE & REHABILITATION

## 2018-08-01 PROCEDURE — 6370000000 HC RX 637 (ALT 250 FOR IP): Performed by: STUDENT IN AN ORGANIZED HEALTH CARE EDUCATION/TRAINING PROGRAM

## 2018-08-01 PROCEDURE — 97127 HC SP THER IVNTJ W/FOCUS COG FUNCJ: CPT

## 2018-08-01 PROCEDURE — 92507 TX SP LANG VOICE COMM INDIV: CPT

## 2018-08-01 PROCEDURE — 51798 US URINE CAPACITY MEASURE: CPT

## 2018-08-01 PROCEDURE — 97116 GAIT TRAINING THERAPY: CPT

## 2018-08-01 PROCEDURE — 97112 NEUROMUSCULAR REEDUCATION: CPT

## 2018-08-01 PROCEDURE — 97530 THERAPEUTIC ACTIVITIES: CPT

## 2018-08-01 RX ORDER — TRAMADOL HYDROCHLORIDE 50 MG/1
50 TABLET ORAL EVERY 4 HOURS PRN
Status: DISCONTINUED | OUTPATIENT
Start: 2018-08-01 | End: 2018-08-14 | Stop reason: HOSPADM

## 2018-08-01 RX ADMIN — ENOXAPARIN SODIUM 40 MG: 40 INJECTION SUBCUTANEOUS at 09:57

## 2018-08-01 RX ADMIN — STANDARDIZED SENNA CONCENTRATE AND DOCUSATE SODIUM 2 TABLET: 8.6; 5 TABLET, FILM COATED ORAL at 22:07

## 2018-08-01 RX ADMIN — HYDROCHLOROTHIAZIDE 25 MG: 25 TABLET ORAL at 10:10

## 2018-08-01 RX ADMIN — METHOCARBAMOL 750 MG: 750 TABLET ORAL at 22:06

## 2018-08-01 RX ADMIN — GLIPIZIDE 5 MG: 5 TABLET ORAL at 08:29

## 2018-08-01 RX ADMIN — ASPIRIN 81 MG: 81 TABLET, COATED ORAL at 10:07

## 2018-08-01 RX ADMIN — LOSARTAN POTASSIUM 100 MG: 100 TABLET ORAL at 10:07

## 2018-08-01 RX ADMIN — METFORMIN HYDROCHLORIDE 2000 MG: 500 TABLET, EXTENDED RELEASE ORAL at 17:40

## 2018-08-01 RX ADMIN — BACLOFEN 10 MG: 10 TABLET ORAL at 22:06

## 2018-08-01 RX ADMIN — METHOCARBAMOL 750 MG: 750 TABLET ORAL at 13:31

## 2018-08-01 RX ADMIN — TAMSULOSIN HYDROCHLORIDE 0.4 MG: 0.4 CAPSULE ORAL at 22:07

## 2018-08-01 RX ADMIN — METOPROLOL TARTRATE 25 MG: 25 TABLET ORAL at 22:06

## 2018-08-01 RX ADMIN — METOPROLOL TARTRATE 25 MG: 25 TABLET ORAL at 10:23

## 2018-08-01 RX ADMIN — GUAIFENESIN 1200 MG: 600 TABLET, EXTENDED RELEASE ORAL at 10:10

## 2018-08-01 RX ADMIN — STANDARDIZED SENNA CONCENTRATE AND DOCUSATE SODIUM 2 TABLET: 8.6; 5 TABLET, FILM COATED ORAL at 10:12

## 2018-08-01 RX ADMIN — METHOCARBAMOL 750 MG: 750 TABLET ORAL at 10:05

## 2018-08-01 RX ADMIN — FLUTICASONE PROPIONATE 1 SPRAY: 50 SPRAY, METERED NASAL at 10:12

## 2018-08-01 RX ADMIN — PIOGLITAZONE 15 MG: 15 TABLET ORAL at 10:09

## 2018-08-01 RX ADMIN — AMLODIPINE BESYLATE 10 MG: 10 TABLET ORAL at 10:05

## 2018-08-01 RX ADMIN — ATORVASTATIN CALCIUM 80 MG: 80 TABLET, FILM COATED ORAL at 22:06

## 2018-08-01 ASSESSMENT — PAIN SCALES - GENERAL
PAINLEVEL_OUTOF10: 8
PAINLEVEL_OUTOF10: 0

## 2018-08-01 ASSESSMENT — PAIN DESCRIPTION - PROGRESSION: CLINICAL_PROGRESSION: NOT CHANGED

## 2018-08-01 ASSESSMENT — PAIN DESCRIPTION - LOCATION: LOCATION: KNEE

## 2018-08-01 ASSESSMENT — PAIN DESCRIPTION - ORIENTATION: ORIENTATION: RIGHT

## 2018-08-01 ASSESSMENT — PAIN DESCRIPTION - DESCRIPTORS: DESCRIPTORS: DULL

## 2018-08-01 ASSESSMENT — PAIN DESCRIPTION - PAIN TYPE: TYPE: ACUTE PAIN

## 2018-08-01 ASSESSMENT — PAIN DESCRIPTION - FREQUENCY: FREQUENCY: CONTINUOUS

## 2018-08-01 NOTE — PROGRESS NOTES
Dr. Tamera Stoll made are of pt continued pain to R knee and that pt stated \"it is new pain. \" stated to start tramadol 50mg PO every 4 hours as needed

## 2018-08-01 NOTE — PROGRESS NOTES
Walker  Other Apparatus:  (Used a pillow case to reduce friction on the R foot during swing through)  Assistance: Minimal assistance, Contact guard assistance  Distance: 36' 70' (this included amb up a ramp x 10' and then amb down a ramp x 10' )  OT  PT Equipment Recommendations  Equipment Needed: No (Ongoing assessment)  Toilet - Technique: Ambulating  Equipment Used: Standard toilet  Assessment        SLP  Current Diet : Regular  Current Liquid Diet : Thin  Diet Solids Recommendation: Regular  Liquid Consistency Recommendation: Thin    Body mass index is 30.06 kg/m². 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      HTN:  - Persistently high   - Add Lopressor 25mg bid   - Continue norvasc, cozaar, hctz. - Follow BP. Not at goal; increase regimen.     R - knee pain   - Steroid injection yesterday  - Complains of persistent pain today, some better though    DM  - Actos, glipizide metformin; follow blood sugars.        Urinary retention  -  Flomax.     Bowels  - Schedule colace + senna. Follow bowel movements. Enema or suppository if needed.      Bladder  - Check PVR x 3. [address] if PVR > 200ml or if any volume is > 500 ml.      Sleep  - Trazodone provided prn      W/D/W/AStarlene MD Tiesha Palomares, DO  Internal Medicine, PGY1  Pager: [phone]     8/1/2018, 10:02 AM     This patient has been seen, examined, and discussed with the resident. This note has been altered to reflect my own examination findings, impression, and recommendations. Slade Wolf MD 8/1/2018, 1:48 PM

## 2018-08-01 NOTE — PROGRESS NOTES
Physical Therapy  Facility/Department: [facility] ACUTE REHAB UNIT  Daily Treatment Note  NAME: Annmarie Jones  : 1950  MRN: [id number]    Date of Service: 2018    Discharge Recommendations:  Home with Home health PT, Home with assist PRN   DME NEEDS: RLE AFO. Patient Diagnosis(es): There were no encounter diagnoses. has a past medical history of Asthma; BPH; Cataract; Cerebral artery occlusion with cerebral infarction ([facility] [address] Depression; GERD (gastroesophageal reflux disease); Hyperlipidemia; Hypertension; and Type 2 diabetes mellitus ([facility] [address] has a past surgical history that includes Lumbar disc surgery; Nasal septum surgery; Spine surgery (); knee surgery (); eye surgery; Colonoscopy; and other surgical history (N/A, 2016). Restrictions  Position Activity Restriction  Other position/activity restrictions: Up with assist  Subjective   General  Additional Pertinent Hx: Pt is a 69 yo male who presented to the ED on  with R sided weakness. Patient was discharged from the hospital 6 days ago after being admitted following acute CVA with residual right-sided weakness, slurring of his speech and right-sided facial droop. Subjective  Subjective: Pt sitting up willing to participate   Pain Screening  Patient Currently in Pain: Denies  Vital Signs  Patient Currently in Pain: 6/10 left knee       Orientation  Orientation  Overall Orientation Status: Within Normal Limits  Objective   Transfers  Sit to Stand: Contact guard assistance (multiple LOB episodes during AM session). Stand to sit: Contact guard assistance  Ambulation  WB Status: No WB restrictions noted in chart  More Ambulation?: Yes  Ambulation 1  Surface: level tile  Device: Rolling Walker  Assistance: Contact guard assistance to max assist (one major LOB episode requiring max assist to prevent fall). Quality of Gait: demo a very sloe step to/ pattern with R foot drag 2/2 decreased DF strength.  Poor R gluteal control during swing and stance phase resulting in trendelenburg and mild scissoring throughout. Multiple posterior LOB episodes. Distance: 2x100 ft with sitting rest breaks in between pt c/o LE weakness and pain in right knee. Comments: VC for increased B foot clearance with focus on a step through gt pattern , walker positioning/sequencing and safety. 2nd Session:  Assistance: CGA to mod assist (one minor LOB episode). Distance: 2x140 ft with sitting rest breaks in between pt c/o LE weakness and pain in right knee. Quality of Gait:  demo a very step to/ pattern with decreased R foot drag with AFO. Poor R gluteal control during swing and stance phase resulting in trendelenburg and mild scissoring throughout. Multiple posterior LOB episodes. Balance  Sitting - Static: Good  Standing - Static: Fair;-  Standing - Dynamic: Poor  Exercises  Glute sets: 15x5 sec (pt stated no contraction on right side). LAQ: 15x5 BLE (assistance with RLE). APs: x15 LLE x15 PROM with RLE. ML/AP weight shifting while sitting to don on shoes: x5 mins. 2nd session:   Time was spent donning on and educating pt on use of AFO. approx 3-5 mins. Assessment   Assessment:   AM Session:   Pt has regressed compared to previous sessions limited due to right knee pain and LE weakness. Transfers and gait training were unsteady and unsafe; requiring intermittent assistance for balance. Distance limited due to c/o right knee pain; R knee buckling throughout gait training. PM Session:  Pt had less right knee pain; MD has ordered an X-ray. Pts right foot drop and toe drag improved with use of an AFO. Transfers and gait training were still unsteady and unsafe; requiring intermittent assistance for balance.          Treatment Diagnosis: Difficulty with functional mobility 2/2 CVA  Specific instructions for Next Treatment: standing therex; progress gait   Prognosis: Excellent  Patient Education: PT instructed pt with role of PT, bed mobility skills, transf training, gt training and w/c mobility skills . Pt verbalized understanding but will need significant reinforcement   REQUIRES PT FOLLOW UP: Yes       Goals  Short term goals  Time Frame for Short term goals: 10 days   Short term goal 1: MI with all bed mobility skills  Short term goal 2: Pt will transfer sit <> stand  with Mod I  Short term goal 3: Pt will ambulate 150' with LRAD and supervision on level surfaces  Long term goals  Time Frame for Long term goals : 3 weeks   Long term goal 1: Ind with all bed mobility skills  Long term goal 2: MI with amb on level surfaces > vd=326' at a time  Long term goal 3: MI with amb up and down 12 steps with use of a hand rail   Patient Goals   Patient goals : To get both the Rarm annd leg stronger. (especially the R arm to assist with maneuvering the RW more effectively    Plan    Plan  Times per week: 5 out of 7 days x 60 minutes   Times per day: Daily  Specific instructions for Next Treatment: standing therex; progress gait   Current Treatment Recommendations: Strengthening, Balance Training, Endurance Training, Functional Mobility Training, Transfer Training, Gait Training, Stair training, Patient/Caregiver Education & Training, Neuromuscular Re-education  Safety Devices  Type of devices: Left in chair, Call light within reach, Nurse notified, Chair alarm in place     Therapy Time   Individual Concurrent Group Co-treatment   Time In 0900; 1245         Time Out 0945; 1315         Minutes 45; 30          Timed Code Treatment Minutes: [address] Gilberto Gerard, PTA

## 2018-08-01 NOTE — PROGRESS NOTES
POC.  Treatment Diagnosis: Impaired ADLs/transfers and functional mobility, decrease RUE AROM, strength and coordination  Prognosis: Good  Patient Education: neuromuscular ed, grooming, safe transfers- verb understanding  REQUIRES OT FOLLOW UP: Yes  Activity Tolerance  Activity Tolerance: Patient Tolerated treatment well  Safety Devices  Safety Devices in place: Yes  Type of devices: Call light within reach; Chair alarm in place; Left in chair          Plan   Plan  Times per week: 5x per week x75 minutes  Times per day: Daily  Plan weeks: 2-3 weeks  Current Treatment Recommendations: Strengthening, ROM, Balance Training, Functional Mobility Training, Safety Education & Training, Pain Management, Endurance Training, Neuromuscular Re-education, Patient/Caregiver Education & Training, Equipment Evaluation, Education, & procurement, Modalities (comment), Manual Therapy:  STM, Home Management Training, Self-Care / ADL, Cognitive/Perceptual Training  G-Code     OutComes Score                                           AM-PAC Score             Goals  Short term goals  Time Frame for Short term goals: 2 weeks   Short term goal 1: Pt will be min A with toilet [facility] to [facility] improved standing balance and R side strength - progressing, continue  Short term goal 2: Pt will be CGA for toileting to demo improved dynamic standing balance and functional use of R hand - progressing, continue  Short term goal 3: Pt will min A for LE dressing to demo improved use of yogesh-technique strategies. - progressing, continue  Short term goal 4: Pt will be SBA/SPV for UE dressing to demo improved use of yogesh-technique strategies and functional use of RUE - goal met 8/1  Short term goal 5: Pt will be SBA for grooming routine in stance to demo improved dynamic standing  - progressing, continue  Long term goals  Time Frame for Long term goals : 3 weeks  Long term goal 1: Pt will be MOD I with toilet transfer to demo improved standing balance and R

## 2018-08-01 NOTE — PROGRESS NOTES
Speech Language Pathology  ACUTE REHAB UNIT  SPEECH/LANGUAGE PATHOLOGY      [x] Daily  [] Weekly Care Conference Note  [] Discharge    Patient:Sanket Hooper Duty      ORL:[birth year]  DKL:[id number]  Rehab Dx/Hx: Acute ischemic stroke ([facility] [address] [I63.9]    Precautions: [] Aspiration  [x] Fall risk  [] Sternal  [] Seizure [] Hip  [] Weight Bearing [] Other  Lives With: Spouse  ST Dx: [] Aphasia  [] Dysarthria  [] Apraxia   [] Oropharyngeal dysphagia [] Cognitive Impairment  [] Other:   Date of Admit: 7/30/2018  Room #: 3103/3103-01  Date: 8/1/2018          Current Diet Order:DIET CARDIAC; Recommended Form of Meds: Whole with water  Compensatory Swallowing Strategies: Alternate solids and liquids, Check for pocketing of food on the Right, Eat/Feed slowly, Lingual sweep, Upright as possible for all oral intake, Small bites/sips     Dentition: Adequate (missing one molar on bottom left)  Vision  Vision: Impaired  Vision Exceptions: Wears glasses for reading  Hearing  Hearing: Within functional limits  Barriers toward progress: Cognitive deficit and Medication managment        Date: 8/1/2018     Tx session 1   Total Timed Code Min 10   Total Treatment Minutes 30   Individual Treatment Minutes 30   Group Treatment Minutes 0   Co-Treat Minutes 0   Brief Exception: N/A   Pain Pt reported 3/10 pain in his knee. Pain Intervention: [] RN notified  [] Repositioned  [x] Intervention offered and patient declined  [] N/A  [x] Other: Pt reported he had just received steroid shot in his knee and stated, \"I'm waiting for this shot to kick in because it has a little pain medication in it\"   Subjective:     Pt sitting upright in recliner chair. He was alert, cooperative, and pleasant during session.  Pt reported he sometimes feels he doesn't have enough breath support for speaking   Objective / Goals:    Patient will utilize speech intelligibility strategies to produce multi-syllabic words and in conversation to increase intelligibility to >90%. Reviewed speech intelligibility strategies and acronym to recall strategies. Pt needed min cues for use in conversation and was noted with self-correction of multi-syllabic words when slurring words. Discussed breathing/speech coordination and use of diaphragmatic breathing. Pt will complete graded problem-solving tasks with 85% accuracy given min cues. Goal not targeted this session. Pt will complete executive function tasks (i.e. planning, deductive reasoning, inferential, functional organization tasks) with 80% accuracy independently. Goal not targeted this session. Pt will complete short-term memory tasks, of increasing length and complexity, with 80% accuracy or given min cues or use of compensatory strategies/aids. Goal not targeted this session. Pt will complete additional cognitive assessment with goals to be added per POC as needed. Patient was administered portions of the ZULEMA (Assessment of Language-Related Functional Activities) with the following results:  · Understanding Medication Labels:  10/10 (100%)     Other areas targeted: Instructed pt in completing oral/facial exercises to increase lingual/labial/facial strength/ROM/coordination, as he complained of facial droop. Pt required mod verbal and visual cues for completion of exercises on R side. He benefited from mirror as visual feedback.    Education:   Education re: role of SLP, speech intelligibility strategies, purpose of visit and activities, results of testing   Safety Devices: [x] Call light within reach  [x] Chair alarm activated and connected to nurse call light system  [] Bed alarm activated   [] Other:   Assessment: Pt presents with mild cognitive-linguistic deficits and mild dysarthria   Plan: Continue as per POC     Interventions used this date:  [x] Speech/Language Treatment  [] Instruction in HEP  [] Dysphagia Treatment [x] Cognitive Treatment   [] Other:    Discharge recommendations:  [] Home independently  [x] Home with assistance []  24 hour supervision  [] ECF [] Other  Continued Tx Upon Discharge: ? [] Yes    [] No    [x] TBD based on progress while on ARU     [] Vital Stim indicated     [] Other:   Estimated discharge date: Not yet established      Electronically signed by    Shorty Toribio M.S. [id number] [facility]  Speech-Language Pathologist

## 2018-08-01 NOTE — PATIENT CARE CONFERENCE
The [address] [place] [address] Rehabilitation  Weekly Team Conference Note    Patient Name: Patricia Soria        MRN: [id number]    : 1950  (70 y.o.)  Gender: male   Referring Practitioner: Dr. Criss Demarco  Diagnosis: CVA with R hemiparesis    The team conference for this patient was held on 2018 at 1:30pm by:  Chad Anderson. Ila Paulino MD.    PHYSICAL THERAPY:  Bed Mobility: Scooting: Contact guard assistance    Transfers:  Sit to Stand: Contact guard assistance  Stand to sit: Contact guard assistance  Bed to Chair: Minimal assistance (using a SPT technique w/o an AD)    WB Status: No WB restrictions noted in chart  Ambulation 1  Surface: level tile, ramp, uneven  Device: Rolling Walker  Other Apparatus:  (Used a pillow case to reduce friction on the R foot during swing through)  Assistance: Minimal assistance, Contact guard assistance  Quality of Gait: demo a combination of step to/ step through gt pattern, with R foot drag 2/2 decreased DF strength. Distance: 36' 70' (this included amb up a ramp x 10' and then amb down a ramp x 10' )  Comments: VC for increased B foot clearance with focus on a step through gt pattern , walker positioning/sequencing and safety.      Stairs  # Steps : 9  Stairs Height: 6\"  Rails: Left ascending  Device: No Device  Assistance: Minimal assistance  Comment: No reciprocal pattern with increased difficulty \"claering\" the R foot when ascending steps    FIMS:  Bed, Chair, Wheel Chair: 4 - Requires steadying assistance only <25% assist  and/or requires assist with one leg only  Walk: 2 - Maximal Assistance Requires up to Maximal Assistance AND requires assistance of one person to walk/operate wheelchair between  feet (Patient performs 25-49% of locomotion effort or goes between  feet)  Distance Walked: 140'  Stairs: 2- Maximal Assistance Performs 25-49% of the effort to go up and down 4 to 6 stairs and requires the assistance of one person only    PT Equipment Recommendations  Equipment Needed: No (Ongoing assessment)    Assessment: Pt is a 69 yo male who is currently functioning below his baseline following a CVA resulting in R hemiparesis. Pt reports that both his R hand and leg have regressed in the past couple of days. Pt is well below baseline and  would benefit from continued therapy to increase his independence during all functional mobility to allow a safer return to home. SPEECH THERAPY: (intentionally left blank if not actively being seen by this service)   Diet Level: DIET CARDIAC;     FIMS:  Comprehension: 6 - Complex ideas 90% or device (hearing aid/glasses)  Expression: 6 - Device used to express complex ideas/needs  Social Interaction: 6 - Patient requires medication for mood and/or effect  Problem Solvin - Patient solves simple/routine tasks 75-90%+   Memory: 3 - Patient remembers 50%-74% of the time    Assessment: Speech Therapy Diagnosis  Cognitive Diagnosis: Pt presents with mild cognitive-linguistic deficits characterized by deficits in memory, problem solving and executive functioning. Speech Diagnosis: Pt presents with mild dysarthria characterized by slurred speech that worsens after talking for longer periods of time and difficulty with pronunciation of multi-syllabic words.     OCCUPATIONAL THERAPY:  FIMS:  Eatin - Feeds self with setup/supervision/cues and/or requires only setup/supervision/cues to perform tube feedings  Groomin - Able to perform 3-4 tasks with touching help  Bathin - Able to bathe 8-9 areas  Dressing-Upper: 5 - Requires setup/supervision/cues and/or requires assist with presthesis/brace only  Dressing-Lower: 3 - Requires assist with 2-3 parts of dressing  Toiletin - Able to perform 1 task only (e.g. hygiene)  Toilet Transfer: 2 - Requires 50-74% assist getting off toilet  Shower Transfer: 2 - Maximal assistance, pt. expends 25%-49% effort     Assessment: pt demonstrated improving static standing balance for grooming tasks at sink. Able to increase elbow muslce activation during seated ROM with gravity eliminated, pt demonstrates awareness of when he uses compensatory shoulder movements and attempts to self correct. Pt continues to benefit from skilled OT, continue per POC. NUTRITION:  Weight: 246 lb 14.6 oz (112 kg) / Body mass index is 30.06 kg/m². Current diet order: Current diet and supplement order: DIET CARDIAC; Diet Ordered: Cardiac  Feeding: Needs set up  Room Service: Selective   NSG Recorded PO: PO Meals Eaten (%): 76 - 100% last meal in flowsheets    Nutrition Risk Level Risk Level: Low  Malnutrition Status Malnutrition Status: No malnutrition  Please see nutrition evaluation per nutrition standards of care for additional info. Greg Carlson, PARAMJIT, DENISE  Pager:  [phone]  Office:  [phone]    NURSING:  FIMS:  Bladder Level of Assistance: 4- Requires Minimal assistance to manage or place device, patient performs 75% or more of the bladder management tasks  Bladder Frequency of Accidents:  (0)  Bowel Level of Assistance: 6- Requires device like bedpan, diaper, bedside commode, but patient obtains and empties own device including colostomy. Or requires bowel management medication including stool softeners, laxatives, inserts own suppository  Bowel Frequency of Accidents:  (0)    Choi Fall Risk Score: 40  Wounds/Incisions/Ulcers: None  Medication Review: Medication reviewed with patient. Pain: No Pain  Consultations/Labs/X-rays: M/TH CBC    Patient/Family Education provided by team:      CASE MANAGEMENT:  Assessment:  Pt lives in a 2 story home with his spouse. Pt is [facility] with [facility] and [facility] walker and cane [facility]. TEAM SUMMARY:  Limited by R knee pain,  PT ordering brace,  Est DC 8-12       DISCHARGE PLAN:  Risk for Readmission: Moderate (10-19)   Critical Items: If High Risk, consider the following recommendations: Follow-Up with PCP in one week   Estimated Length of Stay:14 days  Destination: home health  Services at Discharge: Outpatient Physical Therapy OT  Community Resources:   Equipment at Discharge: TBD  Factors facilitating achievement of predicted outcomes: Motivated  Barriers to the achievement of predicted outcomes: Pain    Interdisciplinary Individualized Plan of Care Review:    · Continue Current Plan of Care: Yes    · Modifications:_____________________________    Special Needs in the Upcoming Week :    [] Family/Caregiver Education  [] Home visit  []Therapeutic Pass   [] Consults:_______   [] Family Training  [] Other;_______    Patient Goals for Rehab stay:  1. Return Home  2.   3.      Rehab Team Goals for patient for the Upcoming Week:  1. Increase participation w/brace and reduced pain  2.   3.     Rehab Team Members in attendance for Team Conference:  :  Stephenie Starr    Nurse Manager:  RAKAN RoeN, RN    Therapy Manager:  Omer Hough, PT, DPT    Social Work:  Marita Cervantes  Nursing:  RAKAN HarkinsN, RN   Magda Velázquez, RN  Gilford Ables, BSN, RN  Ivonne Mcclain RN    Therapy:  Tere King, PT  David Vasquez, PT, DPT  Josiah Sofia, PT, DPT    Jon Byrnes, OTR/L  [facility], OTR/L    Marietta Campos MA/CCC-SLP    Nutrition:  Hung Cruz RD LD    I approve the established interdisciplinary plan of care as documented within the medical record of Leandro Griffin  MIMI Gupta MD 8/6/2018, 11:31 AM

## 2018-08-01 NOTE — PROGRESS NOTES
Occupational Therapy  Facility/Department: [facility] ACUTE REHAB UNIT  Daily Treatment Note  NAME: Leandro Ricardo  : 1950  MRN: [id number]    Date of Service: 2018    Discharge Recommendations:  Outpatient OT, Home with assist PRN  OT Equipment Recommendations  Equipment Needed: Yes  Other: shower chair - TBD    Patient Diagnosis(es): There were no encounter diagnoses. has a past medical history of Asthma; BPH; Cataract; Cerebral artery occlusion with cerebral infarction ([facility] [address] Depression; GERD (gastroesophageal reflux disease); Hyperlipidemia; Hypertension; and Type 2 diabetes mellitus ([facility] [address] has a past surgical history that includes Lumbar disc surgery; Nasal septum surgery; Spine surgery (); knee surgery (); eye surgery; Colonoscopy; and other surgical history (N/A, 2016). Restrictions  Position Activity Restriction  Other position/activity restrictions: Up with assist  Subjective   General  Chart Reviewed: Yes  Patient assessed for rehabilitation services?: Yes  Additional Pertinent Hx: 70 y.o. M admitted  with right sided weakness and facial droop. Head CT= Infarct left melba from scan on , no acute hemorrhage. CXR (-) for acute findings. Recent admit ~6 days prior for acute CVA, was d/c'ed home. PMHx= BPH, cataract, cerebral artery occlusion, depression, HTN, DM. Pt admitted to ARU on   Family / Caregiver Present: No  Referring Practitioner: Dr. Anuj Gupta  Diagnosis: CVA  Subjective  Subjective: In bed on arrival, agreeable to OT.  Reported increase pain in R knee affected sleep last night  Pain Assessment  Patient Currently in Pain: Yes  Pain Assessment: 0-10  Pain Level: 8  Pain Type: Acute pain  Pain Location: Knee  Pain Orientation: Right  Pain Descriptors: Dull  Pain Frequency: Continuous  Clinical Progression: Not changed  Patient's Stated Pain Goal: No pain  Pain Intervention(s): Declines  Response to Pain Intervention: Patient Satisfied  Vital Signs  Patient Currently in Pain: Yes     Orientation  Orientation  Overall Orientation Status: Within Functional Limits    Objective    ADL  UE Dressing: Setup;Verbal cueing (seated b/s chair to doff/don shirt)  LE Dressing: Maximum assistance  --supine to doff non skid socks to don compression socks from home. Pt seated EOB for OT to assist with donning non-skid socks. Pt completed sit<->stand with multiple attempts to be successful with min A + VCs for anterior WS. Pt able to pull down pants with CGA. Pt seated to thread clean shorts through LEs. Pt required assist for RLE positioning in figure 4 position to facilitate independence with threading RLE. Pt then able to thread LLE with SBA. Pt completed sit<->stand demo'ing improved technique requiring min A. Pt able to pull up shorts in stance with min A. Shoes not addressed this date d/t time constraint of session. Toileting: assist to place urinal with accident noted. Pt with little warning when need to void bladder. Standing Balance  Sit to stand: Minimal assistance + VCs for anterior WS  Stand to sit: Minimal assistance + VCs for anterior WS and controlled descend    Bed mobility  Supine to Sit: Stand by assistance    Transfers  Stand Step Transfers: Contact guard assistance EOB->b/s chair with RW with R yogesh-.       Cognition  Overall Cognitive Status: WFL      RUE PROM (supine)  --shoulder flexion limited to 90 degrees  --shoulder abduction limited to ~135 degrees    Type of ROM/Therapeutic Exercise: TIFFANIE LORENZO in supine, 10 reps each  --shoulder flexion: facilitation for anterior delt and inhibitory cues for biceps, completed within limited ROM d/t pain  --shoulder abduction: facilitation for middle delt and inhibitory cues for biceps, completed within limited ROM d/t pain  --elbow extension: facilitation for triceps  **Pt tolerated well requiring rest break for shoulder flexion after 6 reps d/t fatigue**      Assessment   Performance deficits / Impairments: will be CGA for toileting to demo improved dynamic standing balance and functional use of R hand - progressing, continue  Short term goal 3: Pt will min A for LE dressing to demo improved use of yogesh-technique strategies. - progressing, continue  Short term goal 4: Pt will be SBA/SPV for UE dressing to demo improved use of yogesh-technique strategies and functional use of RUE - goal met 8/1  Short term goal 5: Pt will be SBA for grooming routine in stance to demo improved dynamic standing  - progressing, continue  Long term goals  Time Frame for Long term goals : 3 weeks  Long term goal 1: Pt will be MOD I with toilet transfer to demo improved standing balance and R side strength - progressing, continue  Long term goal 2: Pt will be MOD I for toileting to demo improved dynamic standing balance and functional use of R hand - progressing, continue  Long term goal 3: Pt will SBA  for LE dressing to demo improved use of yogesh-technique strategies. - progressing, continue  Long term goal 4: Pt will be MOD I for UE dressing to demo improved use of yogesh-technique strategies and functional use of RUE - progressing, continue  Long term goal 5: Pt will be MOD I for grooming routine in stance to demo improved dynamic standing balance/tolerance - progressing, continue  Patient Goals   Patient goals :  \"To get my right side back\"        Therapy Time   Individual Concurrent Group Co-treatment   Time In 0730         Time Out 0815         Minutes 45         Timed Code Treatment Minutes: [address] Salma Torres, OT

## 2018-08-02 LAB
ANION GAP SERPL CALCULATED.3IONS-SCNC: 8 MMOL/L (ref 3–16)
BASOPHILS ABSOLUTE: 0 K/UL (ref 0–0.2)
BASOPHILS RELATIVE PERCENT: 0.3 %
BUN BLDV-MCNC: 21 MG/DL (ref 7–20)
CALCIUM SERPL-MCNC: 10.3 MG/DL (ref 8.3–10.6)
CHLORIDE BLD-SCNC: 99 MMOL/L (ref 99–110)
CO2: 31 MMOL/L (ref 21–32)
CREAT SERPL-MCNC: 0.7 MG/DL (ref 0.8–1.3)
EOSINOPHILS ABSOLUTE: 0.2 K/UL (ref 0–0.6)
EOSINOPHILS RELATIVE PERCENT: 2.6 %
GFR AFRICAN AMERICAN: >60
GFR NON-AFRICAN AMERICAN: >60
GLUCOSE BLD-MCNC: 125 MG/DL (ref 70–99)
HCT VFR BLD CALC: 39.8 % (ref 40.5–52.5)
HEMOGLOBIN: 13.4 G/DL (ref 13.5–17.5)
LYMPHOCYTES ABSOLUTE: 1.3 K/UL (ref 1–5.1)
LYMPHOCYTES RELATIVE PERCENT: 15.7 %
MCH RBC QN AUTO: 30.1 PG (ref 26–34)
MCHC RBC AUTO-ENTMCNC: 33.6 G/DL (ref 31–36)
MCV RBC AUTO: 89.6 FL (ref 80–100)
MONOCYTES ABSOLUTE: 0.8 K/UL (ref 0–1.3)
MONOCYTES RELATIVE PERCENT: 10.2 %
NEUTROPHILS ABSOLUTE: 5.7 K/UL (ref 1.7–7.7)
NEUTROPHILS RELATIVE PERCENT: 71.2 %
PDW BLD-RTO: 13.7 % (ref 12.4–15.4)
PLATELET # BLD: 209 K/UL (ref 135–450)
PMV BLD AUTO: 7.8 FL (ref 5–10.5)
POTASSIUM REFLEX MAGNESIUM: 4 MMOL/L (ref 3.5–5.1)
RBC # BLD: 4.44 M/UL (ref 4.2–5.9)
SODIUM BLD-SCNC: 138 MMOL/L (ref 136–145)
WBC # BLD: 8.1 K/UL (ref 4–11)

## 2018-08-02 PROCEDURE — 80048 BASIC METABOLIC PNL TOTAL CA: CPT

## 2018-08-02 PROCEDURE — 97116 GAIT TRAINING THERAPY: CPT | Performed by: PHYSICAL THERAPIST

## 2018-08-02 PROCEDURE — 94760 N-INVAS EAR/PLS OXIMETRY 1: CPT

## 2018-08-02 PROCEDURE — 97530 THERAPEUTIC ACTIVITIES: CPT

## 2018-08-02 PROCEDURE — 94664 DEMO&/EVAL PT USE INHALER: CPT

## 2018-08-02 PROCEDURE — 97535 SELF CARE MNGMENT TRAINING: CPT

## 2018-08-02 PROCEDURE — 97127 HC SP THER IVNTJ W/FOCUS COG FUNCJ: CPT

## 2018-08-02 PROCEDURE — 97112 NEUROMUSCULAR REEDUCATION: CPT

## 2018-08-02 PROCEDURE — 92526 ORAL FUNCTION THERAPY: CPT

## 2018-08-02 PROCEDURE — 36415 COLL VENOUS BLD VENIPUNCTURE: CPT

## 2018-08-02 PROCEDURE — 85025 COMPLETE CBC W/AUTO DIFF WBC: CPT

## 2018-08-02 PROCEDURE — 97110 THERAPEUTIC EXERCISES: CPT | Performed by: PHYSICAL THERAPIST

## 2018-08-02 PROCEDURE — 1280000000 HC REHAB R&B

## 2018-08-02 PROCEDURE — 6360000002 HC RX W HCPCS: Performed by: PHYSICAL MEDICINE & REHABILITATION

## 2018-08-02 PROCEDURE — 6370000000 HC RX 637 (ALT 250 FOR IP): Performed by: PHYSICAL MEDICINE & REHABILITATION

## 2018-08-02 PROCEDURE — 6370000000 HC RX 637 (ALT 250 FOR IP): Performed by: STUDENT IN AN ORGANIZED HEALTH CARE EDUCATION/TRAINING PROGRAM

## 2018-08-02 RX ORDER — LANSOPRAZOLE 15 MG/1
15 CAPSULE, DELAYED RELEASE ORAL
Status: DISCONTINUED | OUTPATIENT
Start: 2018-08-03 | End: 2018-08-14 | Stop reason: HOSPADM

## 2018-08-02 RX ADMIN — LOSARTAN POTASSIUM 100 MG: 100 TABLET ORAL at 08:33

## 2018-08-02 RX ADMIN — METOPROLOL TARTRATE 25 MG: 25 TABLET ORAL at 08:32

## 2018-08-02 RX ADMIN — ENOXAPARIN SODIUM 40 MG: 40 INJECTION SUBCUTANEOUS at 08:32

## 2018-08-02 RX ADMIN — PIOGLITAZONE 15 MG: 15 TABLET ORAL at 08:33

## 2018-08-02 RX ADMIN — METFORMIN HYDROCHLORIDE 2000 MG: 500 TABLET, EXTENDED RELEASE ORAL at 17:11

## 2018-08-02 RX ADMIN — GLIPIZIDE 5 MG: 5 TABLET ORAL at 08:32

## 2018-08-02 RX ADMIN — AMLODIPINE BESYLATE 10 MG: 10 TABLET ORAL at 08:32

## 2018-08-02 RX ADMIN — METHOCARBAMOL 750 MG: 750 TABLET ORAL at 08:33

## 2018-08-02 RX ADMIN — BACLOFEN 10 MG: 10 TABLET ORAL at 21:04

## 2018-08-02 RX ADMIN — STANDARDIZED SENNA CONCENTRATE AND DOCUSATE SODIUM 2 TABLET: 8.6; 5 TABLET, FILM COATED ORAL at 08:32

## 2018-08-02 RX ADMIN — HYDROCHLOROTHIAZIDE 25 MG: 25 TABLET ORAL at 08:33

## 2018-08-02 RX ADMIN — ASPIRIN 81 MG: 81 TABLET, COATED ORAL at 08:32

## 2018-08-02 RX ADMIN — TAMSULOSIN HYDROCHLORIDE 0.4 MG: 0.4 CAPSULE ORAL at 21:04

## 2018-08-02 RX ADMIN — METOPROLOL TARTRATE 25 MG: 25 TABLET ORAL at 21:04

## 2018-08-02 RX ADMIN — METHOCARBAMOL 750 MG: 750 TABLET ORAL at 21:04

## 2018-08-02 RX ADMIN — ATORVASTATIN CALCIUM 80 MG: 80 TABLET, FILM COATED ORAL at 21:04

## 2018-08-02 RX ADMIN — METHOCARBAMOL 750 MG: 750 TABLET ORAL at 14:24

## 2018-08-02 ASSESSMENT — PAIN DESCRIPTION - ORIENTATION: ORIENTATION: RIGHT

## 2018-08-02 ASSESSMENT — PAIN DESCRIPTION - PAIN TYPE: TYPE: ACUTE PAIN

## 2018-08-02 ASSESSMENT — PAIN DESCRIPTION - ONSET: ONSET: PROGRESSIVE

## 2018-08-02 ASSESSMENT — PAIN DESCRIPTION - FREQUENCY: FREQUENCY: CONTINUOUS

## 2018-08-02 ASSESSMENT — PAIN DESCRIPTION - DESCRIPTORS: DESCRIPTORS: ACHING

## 2018-08-02 ASSESSMENT — PAIN SCALES - GENERAL
PAINLEVEL_OUTOF10: 0
PAINLEVEL_OUTOF10: 5

## 2018-08-02 ASSESSMENT — PAIN DESCRIPTION - PROGRESSION: CLINICAL_PROGRESSION: NOT CHANGED

## 2018-08-02 ASSESSMENT — PAIN DESCRIPTION - LOCATION: LOCATION: KNEE

## 2018-08-02 NOTE — PROGRESS NOTES
Minimal assistance (using a SPT technique w/o an AD)  Device: Rolling Walker  Other Apparatus:  (Used a pillow case to reduce friction on the R foot during swing through)  Assistance: Minimal assistance, Contact guard assistance  Distance: 36' 70' (this included amb up a ramp x 10' and then amb down a ramp x 10' )  OT  PT Equipment Recommendations  Equipment Needed: No (Ongoing assessment)  Toilet - Technique: Ambulating  Equipment Used: Standard toilet  Assessment        SLP  Current Diet : Regular  Current Liquid Diet : Thin  Diet Solids Recommendation: Regular  Liquid Consistency Recommendation: Thin    Body mass index is 30.06 kg/m². 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      HTN:  Some better with addition of b-blocker, continue to monitor. May increase dose if not more improved tm  - Continue Lopressor 25mg bid   - Continue norvasc, cozaar, hctz. - Follow BP. Not at goal; increase regimen.     R - knee pain w/ remote hx of meniscal tear w/o surgical intervention:  Still complaining of pain, swelling, clicking, and locking of joint with full knee extension. Worse after therapy, limiting factor  - Obtaining a wrap-around brace to be worn with therapy, will get tonight or tm  - Conservative management with compression, ice and elevation when possible      DM  - Actos, glipizide metformin; follow blood sugars.        Urinary retention  -  Flomax.     Bowels  - Schedule colace + senna. Follow bowel movements. Enema or suppository if needed.      Bladder  - Check PVR x 3. [place] if PVR > 200ml or if any volume is > 500 ml.      Sleep  - Trazodone provided prn     GERD  - Bringing home prevacid, ok to use.        W/D/W/MD Quang Sanchez, DO  Internal Medicine, PGY1  Pager: [phone]    This patient has been seen, examined, and discussed with the resident. This note has been altered to reflect my own examination findings, impression, and recommendations. Slade Britt MD 8/2/2018, 2:50 PM

## 2018-08-02 NOTE — PROGRESS NOTES
for hair combing  UE Dressing: Setup;Verbal cueing - using yogesh-technique to don shirt  LE Dressing: Minimal assistance - assist to tie shoes, CGA while pulling pants up in stance (compression stockings already donned)  Toileting: Contact guard assistance - stance at toilet - continent of bladder  Seated EOB for dressing routine except for doffing non-skid socks and donning shoes which was completed in b/s chair     Standing Balance  Sit to stand: Minimal assistance + VCs for anterior WS and hand placement  Stand to sit: Minimal assistance + facilitation for anterior WS and cues for hand placement  Functional Mobility  Functional - Mobility Device: Rolling Walker  Activity: To/from bathroom  Assist Level: Moderate assistance d/t posterior lean/LOB during functional mobility    Bed mobility  Supine to Sit: Stand by assistance;Modified independent  Transfers  Sit to stand: Minimal assistance  Stand to sit: Minimal assistance     Toilet Transfer: CGA stance at sink to void bladder    Therapeutic Activities/ADL:  --encouraged force use throughout session. Pt with improved gross grasp of R hand. Pt worked on holding containers/packages from breakfast tray with R hand while L hand manipulated lids/coverings of containers to open in prep for eating breakfast.    2nd session:  Pt in a light sleep on arrival, easily aroused with VCs. Pt reported need to use restroom. Functional mobility amb level with RW + yogesh  for R hand room->bathroom CGA demo'ing increase steadiness compared to AM session. Pt continent of bladder in stance. Pt completed functional mobility room->diningn room amb level with RW + R yogesh-. Pt participated in RUE shoulder protraction/retraction with elbow flexion/extension. Pt reported increase scapular region (medially, RUE). STM x5 minutes to address pain, pt satisfied and able to participate in exercises.  Pt completed 10 reps of shoulder protraction/retraction bilaterally with fair-good activation

## 2018-08-02 NOTE — PROGRESS NOTES
Speech Language Pathology  ACUTE REHAB UNIT  SPEECH/LANGUAGE PATHOLOGY      [x] Daily  [x] Weekly Care Conference Note  [] Discharge    Patient:Sanket Irizarry      ARLEN:[birth year]  DTL:[id number]  Rehab Dx/Hx: Acute ischemic stroke ([facility] [address] [I63.9]    Precautions: [] Aspiration  [x] Fall risk  [] Sternal  [] Seizure [] Hip  [] Weight Bearing [] Other  Lives With: Spouse  ST Dx: [] Aphasia  [] Dysarthria  [] Apraxia   [] Oropharyngeal dysphagia [] Cognitive Impairment  [] Other:   Date of Admit: 7/30/2018  Room #: 3103/3103-01  Date: 8/2/2018          Current Diet Order:DIET CARDIAC; Recommended Form of Meds: Whole with water  Compensatory Swallowing Strategies: Alternate solids and liquids, Check for pocketing of food on the Right, Eat/Feed slowly, Lingual sweep, Upright as possible for all oral intake, Small bites/sips     Dentition: Adequate (missing one molar on bottom left)  Vision  Vision: Impaired  Vision Exceptions: Wears glasses for reading  Hearing  Hearing: Within functional limits  Barriers toward progress: Cognitive deficit and Medication managment        Date: 8/2/2018      Tx session 1 Weekly Conference   Total Timed Code Min 15    Total Treatment Minutes 30    Individual Treatment Minutes 30    Group Treatment Minutes 0    Co-Treat Minutes 0    Brief Exception: N/A    Pain Denies    Pain Intervention: [] RN notified  [] Repositioned  [] Intervention offered and patient declined  [x] N/A  [] Other:     Subjective:     Pt greeted upright in chair alert and cooperative. Objective / Goals:     Patient will utilize speech intelligibility strategies to produce multi-syllabic words and in conversation to increase intelligibility to >90%. -Pt's speech intelligibility during conversational tasks was grossly functional with few occassions of imprecise articulation. These did not impact intelligibility. Progressing   Pt will complete graded problem-solving tasks with 85% accuracy given min cues. -Pt given moderate level deduction puzzle x2 and completed with min verbal cues and 100% accuracy for each. Progressing   Pt will complete executive function tasks (i.e. planning, deductive reasoning, inferential, functional organization tasks) with 80% accuracy independently.   -Indirectly targeted deductive reasoning in problem solving task above. Progressing   Pt will complete short-term memory tasks, of increasing length and complexity, with 80% accuracy or given min cues or use of compensatory strategies/aids.   -Goal not targeted this session. Progressing   NEW GOAL: Pt will tolerate NMES via VitalStim placement 4a for improved sensation/ROM/strength of right facial muscles. -SLP performed NMES via VitalStim placement 4a at 4.5 mA for 25 minutes. Oral motor exercises for buccal and labial strength were completed x20. Improved motor movement noted during smile/pucker. NEW GOAL   Pt will complete additional cognitive assessment with goals to be added per POC as needed. -Goal not targeted this session. Progressing   Other areas targeted:     Education:   Education re: role of SLP, speech intelligibility strategies, purpose of visit and activities, results of testing Care conference completed with medical/therapy staff regarding pt progress towards goals.     Safety Devices: [x] Call light within reach  [x] Chair alarm activated and connected to nurse call light system  [] Bed alarm activated   [] Other:    Assessment:  Pt presents with mild cognitive-linguistic deficits and mild dysarthria   Plan: Continue as per POC      Interventions used this date:  [x] Speech/Language Treatment  [] Instruction in HEP  [] Dysphagia Treatment [x] Cognitive Treatment   [] Other:    Discharge recommendations:  [] Home independently  [x] Home with assistance []  24 hour supervision  [] ECF [] Other  Continued Tx Upon Discharge: ? [] Yes    [] No    [x] TBD based on progress while on ARU     [] Vital Stim indicated     []

## 2018-08-02 NOTE — PROGRESS NOTES
Physical Therapy  Facility/Department: [facility] ACUTE REHAB UNIT  Daily Treatment Note  NAME: Girish Rice  : 1950  MRN: [id number]    Date of Service: 2018    Discharge Recommendations:  Home with Home health PT, Home with assist PRN   PT Equipment Recommendations  Other: ongoing    Patient Diagnosis(es): There were no encounter diagnoses. has a past medical history of Asthma; BPH; Cataract; Cerebral artery occlusion with cerebral infarction ([address] Depression; GERD (gastroesophageal reflux disease); Hyperlipidemia; Hypertension; and Type 2 diabetes mellitus ([address] has a past surgical history that includes Lumbar disc surgery; Nasal septum surgery; Spine surgery (); knee surgery (); eye surgery; Colonoscopy; and other surgical history (N/A, 2016). Restrictions  Position Activity Restriction  Other position/activity restrictions: Up with assist  Subjective   General  Additional Pertinent Hx: Pt is a 71 yo male who presented to the ED on  with R sided weakness. Patient was discharged from the hospital 6 days ago after being admitted following acute CVA with residual right-sided weakness, slurring of his speech and right-sided facial droop. Family / Caregiver Present: Yes (wife)  Referring Practitioner: Dr. Ivania Champion: Pt sitting up in [facility] chair when PT arrived. Pt agreeable to therapy  General Comment  Comments: Pt reports that his L knee is still bothering him  Pain Screening  Patient Currently in Pain: Yes  Pain Assessment  Pain Level: 5  Pain Type: Acute pain  Pain Location: Knee  Pain Orientation: Right  Pain Descriptors: Aching  Pain Frequency: Continuous  Pain Onset: Progressive  Clinical Progression: Not changed  Patient's Stated Pain Goal: No pain  Pain Intervention(s): Therapeutic presence;Repositioned; Ambulation/Increased activity; Emotional support  Response to Pain Intervention: Patient Satisfied  Multiple Pain Sites: No  Vital Signs  Patient placement and to scoot to the edge of the seat)  Stand to sit: Supervision (VC for hand placement)  Ambulation 1  Surface: level tile  Device: Rolling Walker (With a R  hand adaptor)  Other Apparatus:  (Ace wrap used for a DF assist)  Assistance: Contact guard assistance;Minimal assistance  Quality of Gait: improved step though gt pattern,  instability noted with L knee during midstance in which full ext not consistently achieved  Distance: 100'x2  Comments: Both VC and TC req for an erect posture  Stairs/Curb  Stairs?: Yes  Stairs  # Steps :  (8)  Stairs Height: 6\"  Rails: Left ascending (L descending)  Device: No Device  Assistance: Contact guard assistance  Comment: Non reciprocal pattern with increased difficulty \"clearing\" the R foot when ascending steps. VC for correct sequence  PT instructed pt with the following sitting ex;   1. TR/HR   2. LAQS  3. Marching  4. Hip abd/add  Yulisa 10 reps of each to BLES    Pt returned to room and positioned in BS chair. Call light and phone placed within reach. Chair alarm reactivated                       Assessment   Assessment:  Pt yulisa am session well despite reports of being very fatigued. In the pm session. pt's gt pattern changed 2/2 to the pain in the R knee. Edema was noted in the R knee in the pm but not in am session. Pt is well below baseline and would benefit from continued therapy to increase his independence during all functional mobility to allow a safer return to home. Treatment Diagnosis: Difficulty with functional mobility 2/2 CVA  Prognosis: Excellent  Patient Education: Transf training, gt training, therapetic ex, bed mobility skills  REQUIRES PT FOLLOW UP: Yes  Activity Tolerance  Activity Tolerance: Patient limited by pain; Patient limited by endurance     G-Code     OutComes Score                                                    AM-PAC Score             Goals  Short term goals  Time Frame for Short term goals: 10 days   Short term goal 1: MI with all bed mobility skills. Ongoing  Short term goal 2: Pt will transfer sit <> stand  with Mod I. Ongoing  Short term goal 3: Pt will ambulate 150' with LRAD and supervision on level surfaces. Ongoing  Long term goals  Time Frame for Long term goals : 3 weeks   Long term goal 1: Ind with all bed mobility skills. Ongoing  Long term goal 2: MI with amb on level surfaces > mx=441' at a time. Ongoing  Long term goal 3: MI with amb up and down 12 steps with use of a hand rail . Ongoing  Patient Goals   Patient goals : To get both the Rarm annd leg stronger. (especially the R arm to assist with maneuvering the RW more effectively    Plan    Plan  Times per week: 5 out of 7 days x 60 minutes   Times per day: Daily  Specific instructions for Next Treatment: standing therex; progress gait   Current Treatment Recommendations: Strengthening, Balance Training, Endurance Training, Functional Mobility Training, Transfer Training, Gait Training, Stair training, Patient/Caregiver Education & Training, Neuromuscular Re-education  Safety Devices  Type of devices: Left in chair, Call light within reach, Nurse notified, Chair alarm in place     Therapy Time   Individual Concurrent Group Co-treatment   Time In 1030         Time Out 1115         Minutes 45         Timed Code Treatment Minutes: [address] Yan[address]manza Time:   Individual Concurrent Group Co-treatment   Time In 1250         Time Out 1330         Minutes 40           Timed Code Treatment Minutes: 79+91     Total Treatment Minutes:  [address], PT

## 2018-08-03 PROCEDURE — 97535 SELF CARE MNGMENT TRAINING: CPT

## 2018-08-03 PROCEDURE — 97530 THERAPEUTIC ACTIVITIES: CPT | Performed by: PHYSICAL THERAPIST

## 2018-08-03 PROCEDURE — 97112 NEUROMUSCULAR REEDUCATION: CPT

## 2018-08-03 PROCEDURE — 6370000000 HC RX 637 (ALT 250 FOR IP): Performed by: STUDENT IN AN ORGANIZED HEALTH CARE EDUCATION/TRAINING PROGRAM

## 2018-08-03 PROCEDURE — 6370000000 HC RX 637 (ALT 250 FOR IP): Performed by: PHYSICAL MEDICINE & REHABILITATION

## 2018-08-03 PROCEDURE — 97110 THERAPEUTIC EXERCISES: CPT

## 2018-08-03 PROCEDURE — 1280000000 HC REHAB R&B

## 2018-08-03 PROCEDURE — 92507 TX SP LANG VOICE COMM INDIV: CPT

## 2018-08-03 PROCEDURE — 97116 GAIT TRAINING THERAPY: CPT

## 2018-08-03 PROCEDURE — 6360000002 HC RX W HCPCS: Performed by: PHYSICAL MEDICINE & REHABILITATION

## 2018-08-03 PROCEDURE — 97116 GAIT TRAINING THERAPY: CPT | Performed by: PHYSICAL THERAPIST

## 2018-08-03 PROCEDURE — 97127 HC SP THER IVNTJ W/FOCUS COG FUNCJ: CPT

## 2018-08-03 PROCEDURE — 97530 THERAPEUTIC ACTIVITIES: CPT

## 2018-08-03 RX ORDER — METOPROLOL TARTRATE 50 MG/1
50 TABLET, FILM COATED ORAL 2 TIMES DAILY
Status: DISCONTINUED | OUTPATIENT
Start: 2018-08-03 | End: 2018-08-07

## 2018-08-03 RX ADMIN — GUAIFENESIN 1200 MG: 600 TABLET, EXTENDED RELEASE ORAL at 08:04

## 2018-08-03 RX ADMIN — ASPIRIN 81 MG: 81 TABLET, COATED ORAL at 08:05

## 2018-08-03 RX ADMIN — METHOCARBAMOL 750 MG: 750 TABLET ORAL at 14:11

## 2018-08-03 RX ADMIN — GLIPIZIDE 5 MG: 5 TABLET ORAL at 08:05

## 2018-08-03 RX ADMIN — STANDARDIZED SENNA CONCENTRATE AND DOCUSATE SODIUM 2 TABLET: 8.6; 5 TABLET, FILM COATED ORAL at 20:42

## 2018-08-03 RX ADMIN — METHOCARBAMOL 750 MG: 750 TABLET ORAL at 08:05

## 2018-08-03 RX ADMIN — PIOGLITAZONE 15 MG: 15 TABLET ORAL at 08:04

## 2018-08-03 RX ADMIN — AMLODIPINE BESYLATE 10 MG: 10 TABLET ORAL at 08:05

## 2018-08-03 RX ADMIN — BACLOFEN 10 MG: 10 TABLET ORAL at 21:05

## 2018-08-03 RX ADMIN — METHOCARBAMOL 750 MG: 750 TABLET ORAL at 21:05

## 2018-08-03 RX ADMIN — STANDARDIZED SENNA CONCENTRATE AND DOCUSATE SODIUM 2 TABLET: 8.6; 5 TABLET, FILM COATED ORAL at 08:06

## 2018-08-03 RX ADMIN — TAMSULOSIN HYDROCHLORIDE 0.4 MG: 0.4 CAPSULE ORAL at 21:05

## 2018-08-03 RX ADMIN — METOPROLOL TARTRATE 50 MG: 50 TABLET ORAL at 21:05

## 2018-08-03 RX ADMIN — LANSOPRAZOLE 15 MG: 15 CAPSULE, DELAYED RELEASE ORAL at 06:39

## 2018-08-03 RX ADMIN — ATORVASTATIN CALCIUM 80 MG: 80 TABLET, FILM COATED ORAL at 21:04

## 2018-08-03 RX ADMIN — ENOXAPARIN SODIUM 40 MG: 40 INJECTION SUBCUTANEOUS at 08:04

## 2018-08-03 RX ADMIN — LOSARTAN POTASSIUM 100 MG: 100 TABLET ORAL at 08:04

## 2018-08-03 RX ADMIN — HYDROCHLOROTHIAZIDE 25 MG: 25 TABLET ORAL at 08:03

## 2018-08-03 RX ADMIN — METOPROLOL TARTRATE 25 MG: 25 TABLET ORAL at 08:04

## 2018-08-03 RX ADMIN — METFORMIN HYDROCHLORIDE 2000 MG: 500 TABLET, EXTENDED RELEASE ORAL at 17:56

## 2018-08-03 ASSESSMENT — PAIN SCALES - GENERAL: PAINLEVEL_OUTOF10: 0

## 2018-08-03 NOTE — PROGRESS NOTES
carryover of yogesh-dressing techniques for upper and lower body requiring cues to recall. Pt with significant LOB 2x during session requiring mod-max A to correct d/t R side weakness and decrease safety awareness/insight to deficits. Pt with good response with e-stim to RUE triceps and wrist extensors which were used during AAROM and functional grasp activities. Recommend cont to integrate for NMR of RUE. Pt cont to benefit from skilled OT, cont per POC  Treatment Diagnosis: Impaired ADLs/transfers and functional mobility, decrease RUE AROM, strength and coordination  Prognosis: Good  Patient Education: yogesh-technique for LB dressing - reinforcement needed, purpose and benefits of e-stim as adjunctive treatment intervention - verb understanding  REQUIRES OT FOLLOW UP: Yes  Activity Tolerance  Activity Tolerance: Patient Tolerated treatment well  Safety Devices  Safety Devices in place: Yes  Type of devices: Call light within reach; Chair alarm in place; Left in chair        Plan   Plan  Times per week: 5x per week x75 minutes  Times per day: Daily  Plan weeks: 2-3 weeks  Current Treatment Recommendations: Strengthening, ROM, Balance Training, Functional Mobility Training, Safety Education & Training, Pain Management, Endurance Training, Neuromuscular Re-education, Patient/Caregiver Education & Training, Equipment Evaluation, Education, & procurement, Modalities (comment), Manual Therapy:  STM, Home Management Training, Self-Care / ADL, Cognitive/Perceptual Training    Goals  Short term goals  Time Frame for Short term goals: 2 weeks   Short term goal 1: Pt will be min A with toilet transfer to demo improved standing balance and R side strength - progressing, continue  Short term goal 2: Pt will be CGA for toileting to demo improved dynamic standing balance and functional use of R hand - progressing, continue  Short term goal 3: Pt will min A for LE dressing to demo improved use of yogesh-technique strategies. - goal met

## 2018-08-03 NOTE — PROGRESS NOTES
Pt assessment completed, see flowsheet. Pt A/O x4, VSS, afebrile. Updated on POC. Denies needs at this time. Up in chair with alarm on, Call light within reach. Will continue to monitor.

## 2018-08-03 NOTE — PROGRESS NOTES
Speech Language Pathology  ACUTE REHAB UNIT  SPEECH/LANGUAGE PATHOLOGY      [x] Daily  [] Weekly Care Conference Note  [] Discharge    Patient:Sanket De La Cruz      XDD:[birth year]  IXK:[id number]  Rehab Dx/Hx: Acute ischemic stroke ([facility] [address] [I63.9]    Precautions: [] Aspiration  [x] Fall risk  [] Sternal  [] Seizure [] Hip  [] Weight Bearing [] Other  Lives With: Spouse  ST Dx: [] Aphasia  [] Dysarthria  [] Apraxia   [] Oropharyngeal dysphagia [] Cognitive Impairment  [] Other:   Date of Admit: 7/30/2018  Room #: 3103/3103-01  Date: 8/3/2018          Current Diet Order:DIET CARDIAC; Recommended Form of Meds: Whole with water  Compensatory Swallowing Strategies: Alternate solids and liquids, Check for pocketing of food on the Right, Eat/Feed slowly, Lingual sweep, Upright as possible for all oral intake, Small bites/sips     Dentition: Adequate (missing one molar on bottom left)  Vision  Vision: Impaired  Vision Exceptions: Wears glasses for reading  Hearing  Hearing: Within functional limits  Barriers toward progress: Cognitive deficit and Medication managment        Date: 8/3/2018      Tx session 1    Total Timed Code Min 15    Total Treatment Minutes 30    Individual Treatment Minutes 30    Group Treatment Minutes 0    Co-Treat Minutes 0    Brief Exception: N/A    Pain Denies    Pain Intervention: [] RN notified  [] Repositioned  [] Intervention offered and patient declined  [] N/A  [x] Other:     Subjective:     Pt greeted upright in recliner with family present. Pt pleasant and cooperative. Objective / Goals:     Patient will utilize speech intelligibility strategies to produce multi-syllabic words and in conversation to increase intelligibility to >90%. -No noted slurred speech during session this date. Pt 100% intelligible. Pt will complete graded problem-solving tasks with 85% accuracy given min cues. -Attempted starting calculations of check prior to session ending. Can continue next session. Pt will complete executive function tasks (i.e. planning, deductive reasoning, inferential, functional organization tasks) with 80% accuracy independently.   -Pt asked to organize checks by date written in [address]. Pt correctly sequenced 6/8 by date.   -Pt able to sequence through connecting dots to form picture with 100% accuracy independently. Pt will complete short-term memory tasks, of increasing length and complexity, with 80% accuracy or given min cues or use of compensatory strategies/aids.   -Pt educated on grouping strategy and asked to group 12 pictures into 3 groups for short term recall. After 10 min delay, pt able to recall 10/12 independently and 12/12 with min cues. Pt will tolerate NMES via VitalStim placement 4a for improved sensation/ROM/strength of right facial muscles. -SLP performed NMES via VitalStim placement 4a at 9.0 mA for 25 minutes. Oral motor exercises for buccal and labial strength were completed x20. Improved motor movement noted during smile/pucker. Pt will complete additional cognitive assessment with goals to be added per POC as needed. -Goal not targeted this session.      Other areas targeted:     Education:   Education re: role of SLP, speech intelligibility strategies, purpose of visit and activities, results of testing    Safety Devices: [x] Call light within reach  [x] Chair alarm activated and connected to nurse call light system  [] Bed alarm activated   [] Other:    Assessment:  Pt presents with mild cognitive-linguistic deficits and mild dysarthria   Plan: Continue as per POC      Interventions used this date:  [x] Speech/Language Treatment  [] Instruction in HEP  [] Dysphagia Treatment [x] Cognitive Treatment   [] Other:    Discharge recommendations:  [] Home independently  [x] Home with assistance []  24 hour supervision  [] ECF [] Other  Continued Tx Upon Discharge: ? [] Yes    [] No    [x] TBD based on progress while on ARU     [] Vital Stim

## 2018-08-03 NOTE — PROGRESS NOTES
Pivot Transfers: Contact guard assistance (with RW)  Comment: right knee brace donned for all standing mobility  Ambulation  Ambulation?: Yes  More Ambulation?: Yes  Ambulation 1  Surface: level tile  Device: Rolling Walker (with right hand adapter)  Assistance: Minimal assistance  Quality of Gait: step to gait, left knee instability, left hip hike and circumduction during swing, poor left ankle DF/heel strike   Distance: 70ft  Comments: frequent cues for upright posture  Ambulation 2  Surface - 2: level tile  Device 2: Rolling Walker (with right hand adapter)  Other Apparatus 2:  (right ankle ace wrapped with DF assist)  Assistance 2: Contact guard assistance  Quality of Gait 2: intermittent reciprocal gait, left knee instability, decreased left hip hike/circumdution, improved left heel strike with DF assist  Distance: 155ft   Comments: min to mod A to correct 2 losses of balance during right side step and turns during gait this date     Balance  Sitting - Static: Good  Sitting - Dynamic: Good  Standing - Static: Fair;-  Standing - Dynamic: Poor;+      Second Session: Pt sitting in BS chair when PT arrived. Pt agreeable to therapy. Note , both the Ace wrap DF assist and the R knee brace was still intact    Transfers  Sit to Stand: Contact guard assistance (to RW with intermittent cues for hand placement and sequencing including scooting to the edge of the seat. Transf from [facility] chair and mat table.   Stand to sit: Contact guard assistance (from RW - intermittent cues for hand placement)  Ambulation   Surface -: level tile  Device : Rolling Walker (with right hand adapter)  Other Apparatus :  (right ankle ace wrapped with DF assist)  Assistance : Contact guard assistance  Distance: 185ft ( PT instructed pt with taking longer steps with task of assigning a certain number of steps to a 25' distance(Brown line to brown line) Pt performed 4x and was able to achieve in 13.5 steps) 160' x1  Standing balance activities: PT days   Short term goal 1: MI with all bed mobility skills. Ongoing  Short term goal 2: Pt will transfer sit <> stand  with Mod I. Ongoing  Short term goal 3: Pt will ambulate 150' with LRAD and supervision on level surfaces. Ongoing  Long term goals  Time Frame for Long term goals : 3 weeks   Long term goal 1: Ind with all bed mobility skills. Ongoing  Long term goal 2: MI with amb on level surfaces > fe=398' at a time. Ongoing  Long term goal 3: MI with amb up and down 12 steps with use of a hand rail . Ongoing  Patient Goals   Patient goals : To get both the Rarm annd leg stronger. (especially the R arm to assist with maneuvering the RW more effectively    Plan    Plan  Times per week: 5 out of 7 days x 60 minutes   Times per day: Daily  Specific instructions for Next Treatment: standing therex; progress gait   Current Treatment Recommendations: Strengthening, Balance Training, Endurance Training, Functional Mobility Training, Transfer Training, Gait Training, Stair training, Patient/Caregiver Education & Training, Neuromuscular Re-education, ROM, Home Exercise Program, Safety Education & Training, Equipment Evaluation, Education, & procurement  Safety Devices  Type of devices: Left in chair, Call light within reach, Chair alarm in place, Gait belt     Therapy Time   Individual Concurrent Group Co-treatment   Time In 1103         Time Out 1133         Minutes 30             Second Session Therapy Time:   Individual Concurrent Group Co-treatment   Time In 1420         Time Out 1510         Minutes 50           Timed Code Treatment Minutes:  87+23    Total Treatment Minutes:  [address], [address] [place] [address]  3651 ( treating [facility] therapist for 2nd session)

## 2018-08-03 NOTE — FLOWSHEET NOTE
08/03/18 1036   Encounter Summary   Services provided to: Patient   Referral/Consult From: Rounding   Continue Visiting (es 8/3)   Complexity of Encounter Moderate   Length of Encounter 15 minutes   Routine   Type Initial   Assessment Passive   Intervention Active listening;Discussed illness/injury and it's impact   Outcome Engaged in conversation

## 2018-08-03 NOTE — PROGRESS NOTES
Beth Sánchez  8/3/2018  [id number]    Chief Complaint: Left sided ischemic stroke    Subjective:   No overnight events. Obtained knee brace for R knee pain; knee feeling better whether from brace or injection. Otherwise, no complaints or concerns. ROS: Denies chest pain, dyspnea, fever, chills, or N/V    Objective:  Patient Vitals for the past 24 hrs:   BP Temp Temp src Pulse Resp SpO2 Weight   08/03/18 0803 (!) 179/64 98.4 °F (36.9 °C) Oral 88 16 95 % -   08/03/18 0803 (!) 179/64 - - - - - -   08/03/18 0638 - - - - - - 236 lb 8.9 oz (107.3 kg)   08/02/18 2104 (!) 168/76 98.6 °F (37 °C) Oral 77 18 96 % -   08/02/18 1206 - - - - - 96 % -     Gen: No distress, pleasant. HEENT: Normocephalic, atraumatic. CV: Regular rate and rhythm. Resp: No respiratory distress. Abd: Soft, nontender   Ext: Right knee joint warm and edematous. Clicking with full extension. Crepitus in knees bilaterally with flexion and extension  Neuro: Alert, oriented, appropriately interactive. R-hemiparesis     Wt Readings from Last 3 Encounters:   08/03/18 236 lb 8.9 oz (107.3 kg)   07/23/18 243 lb 13.3 oz (110.6 kg)   12/05/17 245 lb (111.1 kg)       Laboratory data:   Lab Results   Component Value Date    WBC 8.1 08/02/2018    HGB 13.4 (L) 08/02/2018    HCT 39.8 (L) 08/02/2018    MCV 89.6 08/02/2018     08/02/2018       Lab Results   Component Value Date     08/02/2018    K 4.0 08/02/2018    CL 99 08/02/2018    CO2 31 08/02/2018    BUN 21 08/02/2018    CREATININE 0.7 08/02/2018    GLUCOSE 125 08/02/2018    GLUCOSE 157 10/31/2011    CALCIUM 10.3 08/02/2018        Therapy progress:  PT  Position Activity Restriction  Other position/activity restrictions:  Up with assist  Objective     Sit to Stand: Contact guard assistance, Stand by assistance (from [facility] chair and mat table; VC needed for hand placement and to scoot to the edge of the seat)  Stand to sit: Supervision (VC for hand placement)  Bed to Chair: Minimal

## 2018-08-03 NOTE — FLOWSHEET NOTE
08/03/18 1534   Encounter Summary   Continue Visiting (Prayer and blessing from Fr. Nanci Sotelo )   Routine   Intervention Prayer;Lindsay

## 2018-08-04 PROCEDURE — 1280000000 HC REHAB R&B

## 2018-08-04 PROCEDURE — 51798 US URINE CAPACITY MEASURE: CPT

## 2018-08-04 PROCEDURE — 97530 THERAPEUTIC ACTIVITIES: CPT

## 2018-08-04 PROCEDURE — 6370000000 HC RX 637 (ALT 250 FOR IP): Performed by: PHYSICAL MEDICINE & REHABILITATION

## 2018-08-04 PROCEDURE — 97112 NEUROMUSCULAR REEDUCATION: CPT

## 2018-08-04 PROCEDURE — 97127 HC SP THER IVNTJ W/FOCUS COG FUNCJ: CPT

## 2018-08-04 PROCEDURE — 6360000002 HC RX W HCPCS: Performed by: PHYSICAL MEDICINE & REHABILITATION

## 2018-08-04 PROCEDURE — 97116 GAIT TRAINING THERAPY: CPT

## 2018-08-04 PROCEDURE — 97535 SELF CARE MNGMENT TRAINING: CPT

## 2018-08-04 PROCEDURE — 92507 TX SP LANG VOICE COMM INDIV: CPT

## 2018-08-04 RX ADMIN — METOPROLOL TARTRATE 50 MG: 50 TABLET ORAL at 09:55

## 2018-08-04 RX ADMIN — TAMSULOSIN HYDROCHLORIDE 0.4 MG: 0.4 CAPSULE ORAL at 21:07

## 2018-08-04 RX ADMIN — METHOCARBAMOL 750 MG: 750 TABLET ORAL at 21:08

## 2018-08-04 RX ADMIN — METHOCARBAMOL 750 MG: 750 TABLET ORAL at 15:02

## 2018-08-04 RX ADMIN — ENOXAPARIN SODIUM 40 MG: 40 INJECTION SUBCUTANEOUS at 09:56

## 2018-08-04 RX ADMIN — HYDROCHLOROTHIAZIDE 25 MG: 25 TABLET ORAL at 09:55

## 2018-08-04 RX ADMIN — ATORVASTATIN CALCIUM 80 MG: 80 TABLET, FILM COATED ORAL at 21:07

## 2018-08-04 RX ADMIN — GUAIFENESIN 1200 MG: 600 TABLET, EXTENDED RELEASE ORAL at 09:56

## 2018-08-04 RX ADMIN — PIOGLITAZONE 15 MG: 15 TABLET ORAL at 09:55

## 2018-08-04 RX ADMIN — BACLOFEN 10 MG: 10 TABLET ORAL at 21:08

## 2018-08-04 RX ADMIN — AMLODIPINE BESYLATE 10 MG: 10 TABLET ORAL at 09:56

## 2018-08-04 RX ADMIN — LANSOPRAZOLE 15 MG: 15 CAPSULE, DELAYED RELEASE ORAL at 06:08

## 2018-08-04 RX ADMIN — LOSARTAN POTASSIUM 100 MG: 100 TABLET ORAL at 09:56

## 2018-08-04 RX ADMIN — GLIPIZIDE 5 MG: 5 TABLET ORAL at 09:56

## 2018-08-04 RX ADMIN — STANDARDIZED SENNA CONCENTRATE AND DOCUSATE SODIUM 2 TABLET: 8.6; 5 TABLET, FILM COATED ORAL at 21:07

## 2018-08-04 RX ADMIN — STANDARDIZED SENNA CONCENTRATE AND DOCUSATE SODIUM 2 TABLET: 8.6; 5 TABLET, FILM COATED ORAL at 09:55

## 2018-08-04 RX ADMIN — ASPIRIN 81 MG: 81 TABLET, COATED ORAL at 09:56

## 2018-08-04 RX ADMIN — METFORMIN HYDROCHLORIDE 2000 MG: 500 TABLET, EXTENDED RELEASE ORAL at 17:11

## 2018-08-04 RX ADMIN — METHOCARBAMOL 750 MG: 750 TABLET ORAL at 09:55

## 2018-08-04 RX ADMIN — METOPROLOL TARTRATE 50 MG: 50 TABLET ORAL at 21:07

## 2018-08-04 ASSESSMENT — PAIN SCALES - GENERAL
PAINLEVEL_OUTOF10: 0

## 2018-08-04 NOTE — PROGRESS NOTES
Rolling Walker  Other Apparatus 2:  (knee brace)  Assistance 2: Contact guard assistance;Minimal assistance  Distance: 150ft   Comments: posterior LOB Rodrigo to recover after a directional change in afternoon session. Stairs: 3 steps +4 steps(shorter than normal height steps) ( x3 each )  LUE handrail, max cues on sequencing. Observed descending with the LLE and then able to reverse LLE back to the first step to illustrate good control with the RLE but at times requires verbal cues for foot placement on the RLE. Completes with step to pattern, up with good down with bad initially recommended. Pt completed with Rodrigo to [facility]. 3rd Session:  Sit to stand, mod A to Rodrigo required and safety cues more than prior sessions. Ambulation 200ft level surfaces, with several standing rest breaks. Cues to complete smaller step on the LLE to assist with easier advancement of the RLE and intiially required max cues for this, but then consistently was clearing foot for 50ft. Rest breaks helped performance but easily fatigued this session. Stairs: 3 steps +4 steps, Pt required more assist physically with RLE foot placement and knee positioning than prior session this session. Performed with mod to Rodrigo. Assessment   Body structures, Functions, Activity limitations: Decreased functional mobility ; Decreased endurance;Decreased balance;Decreased strength;Decreased vision/visual deficit; Decreased ROM; Decreased coordination  Assessment: Patient demonstrates significantly impaired functional mobility with right sided weakness, requires assist for transfers, mobility on level surfaces and stairs. Right knee is reported as nonpainful today or throughout PT. Pt most limited due to fatigue, made up session and last session pt required more assist with stairs and ambulation was limited requiring more standing rest breaks.  Pt had better foot clearance with shorter step on the LLE, but will require ongoing gait training to assist with safe and

## 2018-08-04 NOTE — PROGRESS NOTES
Occupational Therapy  Facility/Department: [place] [address] ACUTE REHAB UNIT  Daily Treatment Note  NAME: Morteza Kuhn  : 1950  MRN: [id number]    Date of Service: 2018    Discharge Recommendations:  Outpatient OT, Home with assist PRN  OT Equipment Recommendations  Other: shower chair - TBD    Patient Diagnosis(es): There were no encounter diagnoses. has a past medical history of Asthma; BPH; Cataract; Cerebral artery occlusion with cerebral infarction ([facility] [address] Depression; GERD (gastroesophageal reflux disease); Hyperlipidemia; Hypertension; and Type 2 diabetes mellitus ([facility] [address] has a past surgical history that includes Lumbar disc surgery; Nasal septum surgery; Spine surgery (); knee surgery (); eye surgery; Colonoscopy; and other surgical history (N/A, 2016). Restrictions  Restrictions/Precautions  Required Braces or Orthoses?: Yes  Required Braces or Orthoses  Right Lower Extremity Brace: Knee Brace  Position Activity Restriction  Other position/activity restrictions: Up with assist  Subjective   General  Chart Reviewed: Yes  Patient assessed for rehabilitation services?: Yes  Additional Pertinent Hx: 70 y.o. M admitted  with right sided weakness and facial droop. Head CT= Infarct left melba from scan on , no acute hemorrhage. CXR (-) for acute findings. Recent admit ~6 days prior for acute CVA, was d/c'ed home. PMHx= BPH, cataract, cerebral artery occlusion, depression, HTN, DM. Pt admitted to ARU on   Family / Caregiver Present: No  Referring Practitioner: Dr. Ambar Lema  Diagnosis: CVA  Subjective  Subjective: Pt was seated in recliner chair upon arrival and agreeable to OT   General Comment  Comments: . Pain Assessment  Patient Currently in Pain: Yes (Some discomfort in R shoulder. Pt did not rate and declined meds at this time )  Vital Signs  Patient Currently in Pain: Yes (Some discomfort in R shoulder.  Pt did not rate and declined meds at this time ) Orientation  Orientation  Overall Orientation Status: Within Functional Limits  Objective      ADL  Grooming: Stand by assistance;Contact guard assistance (Stance at sink for oral care. Pt was able to hold toothpaste in R hand but used L hand to assist w/ squeezing onto toothbrush. CGA-SBA needed for balance at sink )          Balance  Sitting Balance: Supervision  Standing Balance: Minimal assistance (Min-CGA. One minor LOB )  Standing Balance  Time: 10 mins total  Activity: functional mobility to/from bathroom, functional mobility to/from gym, stance for grooming   Sit to stand: Contact guard assistance  Stand to sit: Contact guard assistance  Functional Mobility  Functional - Mobility Device: Rolling Walker  Activity: To/from bathroom; Other (to/from gym)  Assist Level: Minimal assistance  Functional Mobility Comments: Pt had one minor LOB while turning into hallway requiring min A to correct. Ace wrap present to RLE for DF     Bed mobility  Supine to Sit: Stand by assistance (to EOM)  Sit to Supine: Stand by assistance (EOM)       Transfers  Sit to stand: Contact guard assistance  Stand to sit: Contact guard assistance                                                 Type of ROM/Therapeutic Exercise  Type of ROM/Therapeutic Exercise: AAROM  Exercises  Shoulder Flexion: Supine on mat w/ use of LUE for AAROM. Pt only able to tolerate ~100 degrees before c/o pain. Pt concerned that pain is related to spinal stenosis in his neck. Elbow Flexion: Supine on mat w/ support from OT at elbow. Pt able to complete elbow extension however unable to reach end range. Decreased control w/ flexion. 2nd session: Pt was seated in recliner chair upon arrival and agreeable to OT. Sit to stand from recliner to RW w/ CGA. Pt ambulated w/ RW and CGA-min A to therapy gym. Increased time to complete and one minor LOB requiring min A to correct. Seated rest break upon arrival into gym.  Pt requested to work on grasping tasks d/t frustration w/ not being able to hold containers. Grasp/release completed w/ elbow support on table while pt instructed to grasp cup, bring to mouth to simulate beverage management, and release cup back onto table. Pt demos good control of elbow flexion to bring cup to mouth. Impaired wrist extension therefor increased difficulty w/ maintain cup in appropriate position. Observed shoulder/trunk compensation w/ cues needed for improved posture. Weak grasp on cup and increased difficulty w/ extending fingers to release cup onto table. Increased fatigue in RUE as activity continued. AAROM completed w/ towel for improved shoulder protraction/retraction and elbow extension. Improved success w/ retraction vs protraction. Unable to achieve end range extension d/t weak triceps. Pt again compensating at shoulder for improved motion, cues needed to maintain upright position and relax R shoulder. Pt left seated in chair w/ PT for next session. Assessment   Performance deficits / Impairments: Decreased functional mobility ; Decreased ADL status; Decreased strength;Decreased ROM; Decreased balance;Decreased endurance;Decreased coordination;Decreased safe awareness;Decreased high-level IADLs;Decreased fine motor control  Assessment: Pt demos improved use of RUE for grooming tasks this date and incorporated RUE into oral care tasks w/ assist from LUE. Pt demos increased difficulty w/ tricep and wrist extension and has some increased pain w/ RUE movement. Pt expressed some concerns regarding his lack of progress this date and appeared mildly depressed regarding current functional status. Pt continues to benefit from OT.  Cont OT per POC   Treatment Diagnosis: Impaired ADLs/transfers and functional mobility, decrease RUE AROM, strength and coordination  Patient Education: RUE ROM and AAROM, use of RUE for ADLs,--pt verb understanding   REQUIRES OT FOLLOW UP: Yes  Activity Tolerance  Activity Tolerance: Patient Tolerated treatment well  Activity Tolerance: Some increased frustration noted at end of session. Pt upset about current functional status and appears to have some difficulty coping. Emotional support provided. Safety Devices  Safety Devices in place: Yes  Type of devices: Call light within reach; Chair alarm in place; Left in chair          Plan   Plan  Times per week: 5x per week x75 minutes  Times per day: Daily  Plan weeks: 2-3 weeks  Current Treatment Recommendations: Strengthening, ROM, Balance Training, Functional Mobility Training, Safety Education & Training, Pain Management, Endurance Training, Neuromuscular Re-education, Patient/Caregiver Education & Training, Equipment Evaluation, Education, & procurement, Modalities (comment), Manual Therapy:  STM, Home Management Training, Self-Care / ADL, Cognitive/Perceptual Training  G-Code     OutComes Score                                           AM-PAC Score             Goals  Short term goals  Time Frame for Short term goals: 2 weeks   Short term goal 1: Pt will be min A with toilet transfer to demo improved standing balance and R side strength - progressing, continue  Short term goal 2: Pt will be CGA for toileting to demo improved dynamic standing balance and functional use of R hand - progressing, continue  Short term goal 3: Pt will min A for LE dressing to demo improved use of yogesh-technique strategies. - goal met 8/2  Short term goal 4: Pt will be SBA/SPV for UE dressing to demo improved use of yogesh-technique strategies and functional use of RUE - goal met 8/1  Short term goal 5: Pt will be SBA for grooming routine in stance to demo improved dynamic standing  - goal met 8/4  Long term goals  Time Frame for Long term goals : 3 weeks  Long term goal 1: Pt will be MOD I with toilet transfer to demo improved standing balance and R side strength - progressing, continue  Long term goal 2: Pt will be MOD I for toileting to demo improved dynamic standing balance

## 2018-08-04 NOTE — PROGRESS NOTES
100% throughout session although intelligibility at 80% for complex conversational level. Pt will complete graded problem-solving tasks with 85% accuracy given min cues. Insight into deficits: grossly WFL. Pt with good awareness of current deficits s/p CVA and level of assistance required. Pt will complete executive function tasks (i.e. planning, deductive reasoning, inferential, functional organization tasks) with 80% accuracy independently. Tangential during conversation at times - min cues to redirect and maintain attention to topic / task presented. Pt will complete short-term memory tasks, of increasing length and complexity, with 80% accuracy or given min cues or use of compensatory strategies/aids. Pt independent with recall of ST and details from previous sessions as well as skills targeted with tx. Pt's long-term recall of events leading up to admission, timeline, testing results, and current deficits grossly [facility] - no cues required. Recall of pictured items from category task completed in previous ST session: 75% accuracy independently, improving to 100% accuracy with min verbal cues. Pt will tolerate NMES via VitalStim placement 4a for improved sensation/ROM/strength of right facial muscles. Goal not targeted. Min cues to recall and complete oral motor exercises for labial ROM and facial droop. Pt will complete additional cognitive assessment with goals to be added per POC as needed. Goal not directly targeted. Other areas targeted:    Education:   Educated pt to role of SLP, speech intelligibility strategies, recall strategies, cognitive changes that can occur s/p CVA, and recommended goals and POC. Safety Devices: [x] Call light within reach  [x] Chair alarm activated and connected to nurse call light system  [] Bed alarm activated   [x] Other: reinforced use of call light   Assessment: Progressing towards goals.  Pt with baseline attention deficits per his report but suspect

## 2018-08-05 PROCEDURE — 1280000000 HC REHAB R&B

## 2018-08-05 PROCEDURE — 6370000000 HC RX 637 (ALT 250 FOR IP): Performed by: PHYSICAL MEDICINE & REHABILITATION

## 2018-08-05 PROCEDURE — 6360000002 HC RX W HCPCS: Performed by: PHYSICAL MEDICINE & REHABILITATION

## 2018-08-05 RX ADMIN — ASPIRIN 81 MG: 81 TABLET, COATED ORAL at 08:16

## 2018-08-05 RX ADMIN — TAMSULOSIN HYDROCHLORIDE 0.4 MG: 0.4 CAPSULE ORAL at 20:40

## 2018-08-05 RX ADMIN — METFORMIN HYDROCHLORIDE 2000 MG: 500 TABLET, EXTENDED RELEASE ORAL at 17:29

## 2018-08-05 RX ADMIN — AMLODIPINE BESYLATE 10 MG: 10 TABLET ORAL at 08:16

## 2018-08-05 RX ADMIN — METHOCARBAMOL 750 MG: 750 TABLET ORAL at 08:16

## 2018-08-05 RX ADMIN — GLIPIZIDE 5 MG: 5 TABLET ORAL at 08:16

## 2018-08-05 RX ADMIN — BACLOFEN 10 MG: 10 TABLET ORAL at 20:40

## 2018-08-05 RX ADMIN — METOPROLOL TARTRATE 50 MG: 50 TABLET ORAL at 20:40

## 2018-08-05 RX ADMIN — METHOCARBAMOL 750 MG: 750 TABLET ORAL at 20:40

## 2018-08-05 RX ADMIN — ENOXAPARIN SODIUM 40 MG: 40 INJECTION SUBCUTANEOUS at 08:15

## 2018-08-05 RX ADMIN — HYDROCHLOROTHIAZIDE 25 MG: 25 TABLET ORAL at 08:16

## 2018-08-05 RX ADMIN — ATORVASTATIN CALCIUM 80 MG: 80 TABLET, FILM COATED ORAL at 20:40

## 2018-08-05 RX ADMIN — STANDARDIZED SENNA CONCENTRATE AND DOCUSATE SODIUM 2 TABLET: 8.6; 5 TABLET, FILM COATED ORAL at 20:40

## 2018-08-05 RX ADMIN — METOPROLOL TARTRATE 50 MG: 50 TABLET ORAL at 08:16

## 2018-08-05 RX ADMIN — PIOGLITAZONE 15 MG: 15 TABLET ORAL at 08:15

## 2018-08-05 RX ADMIN — LANSOPRAZOLE 15 MG: 15 CAPSULE, DELAYED RELEASE ORAL at 06:45

## 2018-08-05 RX ADMIN — METHOCARBAMOL 750 MG: 750 TABLET ORAL at 12:36

## 2018-08-05 RX ADMIN — GUAIFENESIN 1200 MG: 600 TABLET, EXTENDED RELEASE ORAL at 08:16

## 2018-08-05 RX ADMIN — LOSARTAN POTASSIUM 100 MG: 100 TABLET ORAL at 08:16

## 2018-08-05 RX ADMIN — STANDARDIZED SENNA CONCENTRATE AND DOCUSATE SODIUM 2 TABLET: 8.6; 5 TABLET, FILM COATED ORAL at 08:16

## 2018-08-05 ASSESSMENT — PAIN SCALES - GENERAL
PAINLEVEL_OUTOF10: 0
PAINLEVEL_OUTOF10: 0

## 2018-08-05 NOTE — PROGRESS NOTES
Pt up to chair, watching television. No complaints of pain, pt did say he had mild discomfort to right shoulder but not painful. Assessment completed. Nighttime medications given. Pt wanted to remain chair until he is able to try to have a bowel movement. Reminded pt to call for assistance with any needs. Call light within reach. Safety measures in place. No needs at this time.

## 2018-08-06 LAB
BASOPHILS ABSOLUTE: 0 K/UL (ref 0–0.2)
BASOPHILS RELATIVE PERCENT: 0.4 %
EOSINOPHILS ABSOLUTE: 0.3 K/UL (ref 0–0.6)
EOSINOPHILS RELATIVE PERCENT: 2.8 %
HCT VFR BLD CALC: 39.1 % (ref 40.5–52.5)
HEMOGLOBIN: 13.1 G/DL (ref 13.5–17.5)
LYMPHOCYTES ABSOLUTE: 1.5 K/UL (ref 1–5.1)
LYMPHOCYTES RELATIVE PERCENT: 16.4 %
MCH RBC QN AUTO: 29.9 PG (ref 26–34)
MCHC RBC AUTO-ENTMCNC: 33.6 G/DL (ref 31–36)
MCV RBC AUTO: 88.8 FL (ref 80–100)
MONOCYTES ABSOLUTE: 1 K/UL (ref 0–1.3)
MONOCYTES RELATIVE PERCENT: 10.7 %
NEUTROPHILS ABSOLUTE: 6.4 K/UL (ref 1.7–7.7)
NEUTROPHILS RELATIVE PERCENT: 69.7 %
PDW BLD-RTO: 12.9 % (ref 12.4–15.4)
PLATELET # BLD: 253 K/UL (ref 135–450)
PMV BLD AUTO: 8 FL (ref 5–10.5)
RBC # BLD: 4.4 M/UL (ref 4.2–5.9)
WBC # BLD: 9.1 K/UL (ref 4–11)

## 2018-08-06 PROCEDURE — 97110 THERAPEUTIC EXERCISES: CPT

## 2018-08-06 PROCEDURE — 97140 MANUAL THERAPY 1/> REGIONS: CPT

## 2018-08-06 PROCEDURE — 97112 NEUROMUSCULAR REEDUCATION: CPT

## 2018-08-06 PROCEDURE — 6360000002 HC RX W HCPCS: Performed by: PHYSICAL MEDICINE & REHABILITATION

## 2018-08-06 PROCEDURE — 6370000000 HC RX 637 (ALT 250 FOR IP): Performed by: PHYSICAL MEDICINE & REHABILITATION

## 2018-08-06 PROCEDURE — 97530 THERAPEUTIC ACTIVITIES: CPT

## 2018-08-06 PROCEDURE — 97116 GAIT TRAINING THERAPY: CPT

## 2018-08-06 PROCEDURE — 36415 COLL VENOUS BLD VENIPUNCTURE: CPT

## 2018-08-06 PROCEDURE — 97116 GAIT TRAINING THERAPY: CPT | Performed by: PHYSICAL THERAPIST

## 2018-08-06 PROCEDURE — 97127 HC SP THER IVNTJ W/FOCUS COG FUNCJ: CPT

## 2018-08-06 PROCEDURE — 92507 TX SP LANG VOICE COMM INDIV: CPT

## 2018-08-06 PROCEDURE — 85025 COMPLETE CBC W/AUTO DIFF WBC: CPT

## 2018-08-06 PROCEDURE — 1280000000 HC REHAB R&B

## 2018-08-06 RX ADMIN — METFORMIN HYDROCHLORIDE 2000 MG: 500 TABLET, EXTENDED RELEASE ORAL at 17:18

## 2018-08-06 RX ADMIN — GLIPIZIDE 5 MG: 5 TABLET ORAL at 08:34

## 2018-08-06 RX ADMIN — METHOCARBAMOL 750 MG: 750 TABLET ORAL at 14:39

## 2018-08-06 RX ADMIN — METOPROLOL TARTRATE 50 MG: 50 TABLET ORAL at 19:59

## 2018-08-06 RX ADMIN — PIOGLITAZONE 15 MG: 15 TABLET ORAL at 08:49

## 2018-08-06 RX ADMIN — METOPROLOL TARTRATE 50 MG: 50 TABLET ORAL at 08:34

## 2018-08-06 RX ADMIN — ACETAMINOPHEN 650 MG: 325 TABLET, FILM COATED ORAL at 19:59

## 2018-08-06 RX ADMIN — LANSOPRAZOLE 15 MG: 15 CAPSULE, DELAYED RELEASE ORAL at 06:50

## 2018-08-06 RX ADMIN — METHOCARBAMOL 750 MG: 750 TABLET ORAL at 19:59

## 2018-08-06 RX ADMIN — METHOCARBAMOL 750 MG: 750 TABLET ORAL at 08:34

## 2018-08-06 RX ADMIN — STANDARDIZED SENNA CONCENTRATE AND DOCUSATE SODIUM 2 TABLET: 8.6; 5 TABLET, FILM COATED ORAL at 08:34

## 2018-08-06 RX ADMIN — ATORVASTATIN CALCIUM 80 MG: 80 TABLET, FILM COATED ORAL at 19:59

## 2018-08-06 RX ADMIN — HYDROCHLOROTHIAZIDE 25 MG: 25 TABLET ORAL at 08:33

## 2018-08-06 RX ADMIN — ASPIRIN 81 MG: 81 TABLET, COATED ORAL at 08:34

## 2018-08-06 RX ADMIN — LOSARTAN POTASSIUM 100 MG: 100 TABLET ORAL at 08:34

## 2018-08-06 RX ADMIN — AMLODIPINE BESYLATE 10 MG: 10 TABLET ORAL at 08:32

## 2018-08-06 RX ADMIN — TAMSULOSIN HYDROCHLORIDE 0.4 MG: 0.4 CAPSULE ORAL at 19:59

## 2018-08-06 RX ADMIN — TRAMADOL HYDROCHLORIDE 50 MG: 50 TABLET, FILM COATED ORAL at 00:45

## 2018-08-06 RX ADMIN — STANDARDIZED SENNA CONCENTRATE AND DOCUSATE SODIUM 2 TABLET: 8.6; 5 TABLET, FILM COATED ORAL at 19:59

## 2018-08-06 RX ADMIN — BACLOFEN 10 MG: 10 TABLET ORAL at 19:59

## 2018-08-06 RX ADMIN — ENOXAPARIN SODIUM 40 MG: 40 INJECTION SUBCUTANEOUS at 08:35

## 2018-08-06 ASSESSMENT — PAIN DESCRIPTION - PROGRESSION
CLINICAL_PROGRESSION: NOT CHANGED
CLINICAL_PROGRESSION: NOT CHANGED

## 2018-08-06 ASSESSMENT — PAIN DESCRIPTION - LOCATION
LOCATION: SHOULDER
LOCATION: HIP

## 2018-08-06 ASSESSMENT — PAIN DESCRIPTION - FREQUENCY
FREQUENCY: INTERMITTENT
FREQUENCY: INTERMITTENT

## 2018-08-06 ASSESSMENT — PAIN DESCRIPTION - ORIENTATION
ORIENTATION: RIGHT
ORIENTATION: LEFT

## 2018-08-06 ASSESSMENT — PAIN SCALES - GENERAL
PAINLEVEL_OUTOF10: 6
PAINLEVEL_OUTOF10: 0
PAINLEVEL_OUTOF10: 0
PAINLEVEL_OUTOF10: 3

## 2018-08-06 ASSESSMENT — PAIN DESCRIPTION - ONSET
ONSET: GRADUAL
ONSET: GRADUAL

## 2018-08-06 ASSESSMENT — PAIN DESCRIPTION - PAIN TYPE
TYPE: ACUTE PAIN
TYPE: ACUTE PAIN

## 2018-08-06 ASSESSMENT — PAIN DESCRIPTION - DESCRIPTORS
DESCRIPTORS: DULL
DESCRIPTORS: TIGHTNESS;DISCOMFORT

## 2018-08-06 NOTE — PROGRESS NOTES
Veronika Clos  8/6/2018  [id number]    Chief Complaint: Left sided ischemic stroke    Subjective:   No problems over the weekend. Affected knee feeling better with the brace; has a history of chronic neck pain that has flared up a bit today. ROS: Denies chest pain, dyspnea, fever, chills, or N/V    Objective:  Patient Vitals for the past 24 hrs:   BP Temp Temp src Pulse Resp SpO2   08/06/18 0823 (!) 156/84 98 °F (36.7 °C) Oral 86 16 -   08/05/18 2017 136/78 98.5 °F (36.9 °C) Oral 68 16 98 %     Gen: No distress, pleasant. HEENT: Normocephalic, atraumatic. CV: Regular rate and rhythm. Resp: No respiratory distress. Abd: Soft, nontender   Ext: Right knee joint warm and edematous. Clicking with full extension. Crepitus in knees bilaterally with flexion and extension  Neuro: Alert, oriented, appropriately interactive. R-hemiparesis     Wt Readings from Last 3 Encounters:   08/05/18 236 lb 8.9 oz (107.3 kg)   07/23/18 243 lb 13.3 oz (110.6 kg)   12/05/17 245 lb (111.1 kg)       Laboratory data:   Lab Results   Component Value Date    WBC 9.1 08/06/2018    HGB 13.1 (L) 08/06/2018    HCT 39.1 (L) 08/06/2018    MCV 88.8 08/06/2018     08/06/2018       Lab Results   Component Value Date     08/02/2018    K 4.0 08/02/2018    CL 99 08/02/2018    CO2 31 08/02/2018    BUN 21 08/02/2018    CREATININE 0.7 08/02/2018    GLUCOSE 125 08/02/2018    GLUCOSE 157 10/31/2011    CALCIUM 10.3 08/02/2018        Therapy progress:  PT  Required Braces or Orthoses  Right Lower Extremity Brace: Knee Brace  Position Activity Restriction  Other position/activity restrictions:  Up with assist  Objective     Sit to Stand: Contact guard assistance  Stand to sit: Contact guard assistance  Bed to Chair: Minimal assistance (using a SPT technique w/o an AD)  Stand Pivot Transfers: Contact guard assistance (with RW)  Device: Rolling Walker  Other Apparatus:  (knee braceand Ace to improve DF for foot clearance on the affected side- PT completed ace wrap, pt unable to assist due to deficits in the RUE)  Assistance: Minimal assistance, Contact guard assistance  Distance: 150ft x2  OT  PT Equipment Recommendations  Equipment Needed: Yes  Other: MAY NEED AFO. Toilet - Technique: Ambulating  Equipment Used: Standard toilet  Assessment        SLP  Current Diet : Regular  Current Liquid Diet : Thin  Diet Solids Recommendation: Regular  Liquid Consistency Recommendation: Thin    Body mass index is 28.79 kg/m². 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      HTN:  Some better with addition of b-blocker, continue to monitor. May increase dose if not more improved tm  - Continue Lopresso2, increase to 50.  - Continue norvasc, cozaar, hctz. - Follow BP.      R - knee pain w/ remote hx of meniscal tear w/o surgical intervention:  Still complaining of pain, swelling, clicking, and locking of joint with full knee extension. Worse after therapy, limiting factor  - Obtained a wrap-around brace to be worn with therapy,   - Conservative management with compression, ice and elevation when possible   - s/p CSI     DM  - Actos, glipizide metformin; follow blood sugars.        Urinary retention  -  Flomax.     Bowels  - Schedule colace + senna. Follow bowel movements. Enema or suppository if needed.      Bladder  - Check PVR x 3. [place] if PVR > 200ml or if any volume is > 500 ml.      Sleep  - Trazodone provided prn     GERD  - Bringing home prevacid, ok to use. Slade Red MD 8/6/2018, 11:31 AM

## 2018-08-06 NOTE — PROGRESS NOTES
Occupational Therapy  Facility/Department: [facility] ACUTE REHAB UNIT  Daily Treatment Note  NAME: Edgar Fuentes  : 1950  MRN: [id number]    Date of Service: 2018    Discharge Recommendations:  Outpatient OT, Home with assist PRN  OT Equipment Recommendations  Equipment Needed: Yes  Other: shower chair - TBD    Patient Diagnosis(es): There were no encounter diagnoses. has a past medical history of Asthma; BPH; Cataract; Cerebral artery occlusion with cerebral infarction ([address] Depression; GERD (gastroesophageal reflux disease); Hyperlipidemia; Hypertension; and Type 2 diabetes mellitus ([address] has a past surgical history that includes Lumbar disc surgery; Nasal septum surgery; Spine surgery (); knee surgery (); eye surgery; Colonoscopy; and other surgical history (N/A, 2016). Restrictions  Restrictions/Precautions  Required Braces or Orthoses?: Yes  Required Braces or Orthoses  Right Lower Extremity Brace: Knee Brace  Position Activity Restriction  Other position/activity restrictions: Up with assist  Subjective   General  Chart Reviewed: Yes  Patient assessed for rehabilitation services?: Yes  Additional Pertinent Hx: 70 y.o. M admitted  with right sided weakness and facial droop. Head CT= Infarct left melba from scan on , no acute hemorrhage. CXR (-) for acute findings. Recent admit ~6 days prior for acute CVA, was d/c'ed home. PMHx= BPH, cataract, cerebral artery occlusion, depression, HTN, DM. Pt admitted to ARU on   Family / Caregiver Present: No  Referring Practitioner: Dr. Willem Marte  Diagnosis: CVA  Subjective  Subjective: Pt was seated in recliner chair upon arrival and agreeable to OT   General Comment  Comments: . Pain Assessment  Patient Currently in Pain: Yes (L shoulder but did not rate. Declined pain medication, reported \"I need to talk to Dr. Heriberto Dickey")  Vital Signs  Patient Currently in Pain: Yes (L shoulder but did not rate.  Declined pain medication, reported \"I d/t weakness and tightness. AAROM for RUE wrist flexion/extension GE plane requiring assist to achieve full ROM for wrist extension d/t weakness and tightness. Ineffective movement patterns noted for both AAROM exercises. Pt completed each exercise to fatigue, mimicking movement patterns with LUE for bilateral task practice and to relearn normal movement patterns. Pt returned to room amb level with RW and R yogesh- and CGA. Up in chair with needs EOS, CA engaged. Assessment   Performance deficits / Impairments: Decreased functional mobility ; Decreased ADL status; Decreased strength;Decreased ROM; Decreased balance;Decreased endurance;Decreased coordination;Decreased safe awareness;Decreased high-level IADLs;Decreased fine motor control  Assessment: Pt cont to require cues during transitional movement, functional mobility amb level with RW and R yogesh- and functional transfers. Pt with 1 LOB this date - pt cont to be at risk for falls d/t R side weakness/hemiparesis. Pt demo'd increase strength in RUE, specifically shoulder flexion and abduction, benefitting from e-stim as adjunctive treatment intervention to assist with elbow extension for NMR and facilitating normal movement patterns. Pt cont to benefit from skilled OT, cont per POC  Treatment Diagnosis: Impaired ADLs/transfers and functional mobility, decrease RUE AROM, strength and coordination  Prognosis: Good  Patient Education: safety during functional mobility and transfers, reinforcement needed  REQUIRES OT FOLLOW UP: Yes  Activity Tolerance  Activity Tolerance: Patient Tolerated treatment well  Safety Devices  Safety Devices in place: Yes  Type of devices: Call light within reach; Chair alarm in place; Left in chair        Plan   Plan  Times per week: 5x per week x75 minutes  Times per day: Daily  Plan weeks: 2-3 weeks  Current Treatment Recommendations: Strengthening, ROM, Balance Training, Functional Mobility Training, Safety Education & Training, Pain Management, Endurance Training, Neuromuscular Re-education, Patient/Caregiver Education & Training, Equipment Evaluation, Education, & procurement, Modalities (comment), Manual Therapy:  STM, Home Management Training, Self-Care / ADL, Cognitive/Perceptual Training    Goals  Short term goals  Time Frame for Short term goals: 2 weeks   Short term goal 1: Pt will be min A with toilet transfer to demo improved standing balance and R side strength - progressing, continue  Short term goal 2: Pt will be CGA for toileting to demo improved dynamic standing balance and functional use of R hand - progressing, continue  Short term goal 3: Pt will min A for LE dressing to demo improved use of yogesh-technique strategies. - goal met 8/2  Short term goal 4: Pt will be SBA/SPV for UE dressing to demo improved use of yogesh-technique strategies and functional use of RUE - goal met 8/1  Short term goal 5: Pt will be SBA for grooming routine in stance to demo improved dynamic standing  - goal met 8/4  Long term goals  Time Frame for Long term goals : 3 weeks  Long term goal 1: Pt will be MOD I with toilet transfer to demo improved standing balance and R side strength - progressing, continue  Long term goal 2: Pt will be MOD I for toileting to demo improved dynamic standing balance and functional use of R hand - progressing, continue  Long term goal 3: Pt will SBA  for LE dressing to demo improved use of yogesh-technique strategies. - progressing, continue  Long term goal 4: Pt will be MOD I for UE dressing to demo improved use of yogesh-technique strategies and functional use of RUE - progressing, continue  Long term goal 5: Pt will be MOD I for grooming routine in stance to demo improved dynamic standing balance/tolerance - progressing, continue  Patient Goals   Patient goals :  \"To get my right side back\"        Therapy Time   Individual Concurrent Group Co-treatment   Time In 0730         Time Out 0815         Minutes 45         Timed Code Treatment Minutes: 39 Minutes     Second Session Therapy Time:   Individual Concurrent Group Co-treatment   Time In 1320         Time Out 1350         Minutes 30           Timed Code Treatment Minutes: 30 + 45 Minutes    Total Treatment Minutes:  75 minutes    Raven Eli OT

## 2018-08-06 NOTE — PROGRESS NOTES
week: 5 out of 7 days x 60 minutes   Times per day: Daily  Specific instructions for Next Treatment: standing therex; progress gait   Current Treatment Recommendations: Strengthening, Balance Training, Endurance Training, Functional Mobility Training, Transfer Training, Gait Training, Stair training, Patient/Caregiver Education & Training, Neuromuscular Re-education, ROM, Home Exercise Program, Safety Education & Training, Equipment Evaluation, Education, & procurement, Positioning, Manual Therapy - Soft Tissue Mobilization, Modalities, ADL/Self-care Training, IADL Training  Safety Devices  Type of devices: Left in chair, Call light within reach, Chair alarm in place, Gait belt, All fall risk precautions in place, Bed alarm in place, Patient at risk for falls, Positioning belt     Therapy Time   Individual Concurrent Group Co-treatment   Time In 0915         Time Out 1000         Minutes 45         Timed Code Treatment Minutes: 45 Minutes    Second Session Therapy Time:   Individual Concurrent Group Co-treatment   Time In 1045         Time Out 1115         Minutes 30           Timed Code Treatment Minutes:  45+30    Total Treatment Minutes:  [address] ( treating/documenting  therapist for 2nd session)

## 2018-08-06 NOTE — PROGRESS NOTES
goals.     Plan:  Continue as per POC     Interventions used this date:  [x] Speech/Language Treatment  [] Instruction in HEP  [] Dysphagia Treatment [x] Cognitive Treatment   [] Other:    Discharge recommendations:  [] Home independently  [x] Home with assistance []  24 hour supervision  [] ECF [] Other  Continued Tx Upon Discharge: ? [] Yes    [] No    [x] TBD based on progress while on ARU     [] Vital Stim indicated     [] Other:   Estimated discharge date: 8/12/18      Electronically signed by  Kym Hodge MA CCC-SLP; [id number]  Speech-Language Pathologist

## 2018-08-07 PROCEDURE — 97112 NEUROMUSCULAR REEDUCATION: CPT

## 2018-08-07 PROCEDURE — 97110 THERAPEUTIC EXERCISES: CPT | Performed by: PHYSICAL THERAPIST

## 2018-08-07 PROCEDURE — 97110 THERAPEUTIC EXERCISES: CPT

## 2018-08-07 PROCEDURE — 97127 HC SP THER IVNTJ W/FOCUS COG FUNCJ: CPT

## 2018-08-07 PROCEDURE — 97530 THERAPEUTIC ACTIVITIES: CPT

## 2018-08-07 PROCEDURE — 97116 GAIT TRAINING THERAPY: CPT | Performed by: PHYSICAL THERAPIST

## 2018-08-07 PROCEDURE — 97535 SELF CARE MNGMENT TRAINING: CPT

## 2018-08-07 PROCEDURE — 6370000000 HC RX 637 (ALT 250 FOR IP): Performed by: PHYSICAL MEDICINE & REHABILITATION

## 2018-08-07 PROCEDURE — 97530 THERAPEUTIC ACTIVITIES: CPT | Performed by: PHYSICAL THERAPIST

## 2018-08-07 PROCEDURE — 92507 TX SP LANG VOICE COMM INDIV: CPT

## 2018-08-07 PROCEDURE — 1280000000 HC REHAB R&B

## 2018-08-07 PROCEDURE — 6360000002 HC RX W HCPCS: Performed by: PHYSICAL MEDICINE & REHABILITATION

## 2018-08-07 RX ADMIN — METHOCARBAMOL 750 MG: 750 TABLET ORAL at 14:35

## 2018-08-07 RX ADMIN — GLIPIZIDE 5 MG: 5 TABLET ORAL at 08:50

## 2018-08-07 RX ADMIN — ATORVASTATIN CALCIUM 80 MG: 80 TABLET, FILM COATED ORAL at 21:03

## 2018-08-07 RX ADMIN — LANSOPRAZOLE 15 MG: 15 CAPSULE, DELAYED RELEASE ORAL at 05:49

## 2018-08-07 RX ADMIN — METHOCARBAMOL 750 MG: 750 TABLET ORAL at 21:03

## 2018-08-07 RX ADMIN — METOPROLOL TARTRATE 75 MG: 50 TABLET ORAL at 21:03

## 2018-08-07 RX ADMIN — AMLODIPINE BESYLATE 10 MG: 10 TABLET ORAL at 08:50

## 2018-08-07 RX ADMIN — TAMSULOSIN HYDROCHLORIDE 0.4 MG: 0.4 CAPSULE ORAL at 21:03

## 2018-08-07 RX ADMIN — ASPIRIN 81 MG: 81 TABLET, COATED ORAL at 08:50

## 2018-08-07 RX ADMIN — LOSARTAN POTASSIUM 100 MG: 100 TABLET ORAL at 08:50

## 2018-08-07 RX ADMIN — METFORMIN HYDROCHLORIDE 2000 MG: 500 TABLET, EXTENDED RELEASE ORAL at 17:12

## 2018-08-07 RX ADMIN — GUAIFENESIN 1200 MG: 600 TABLET, EXTENDED RELEASE ORAL at 08:51

## 2018-08-07 RX ADMIN — STANDARDIZED SENNA CONCENTRATE AND DOCUSATE SODIUM 2 TABLET: 8.6; 5 TABLET, FILM COATED ORAL at 21:03

## 2018-08-07 RX ADMIN — HYDROCHLOROTHIAZIDE 25 MG: 25 TABLET ORAL at 08:49

## 2018-08-07 RX ADMIN — STANDARDIZED SENNA CONCENTRATE AND DOCUSATE SODIUM 2 TABLET: 8.6; 5 TABLET, FILM COATED ORAL at 08:49

## 2018-08-07 RX ADMIN — PIOGLITAZONE 15 MG: 15 TABLET ORAL at 08:49

## 2018-08-07 RX ADMIN — METOPROLOL TARTRATE 50 MG: 50 TABLET ORAL at 08:50

## 2018-08-07 RX ADMIN — METHOCARBAMOL 750 MG: 750 TABLET ORAL at 08:50

## 2018-08-07 RX ADMIN — BACLOFEN 10 MG: 10 TABLET ORAL at 21:03

## 2018-08-07 RX ADMIN — ENOXAPARIN SODIUM 40 MG: 40 INJECTION SUBCUTANEOUS at 08:51

## 2018-08-07 ASSESSMENT — PAIN DESCRIPTION - PROGRESSION
CLINICAL_PROGRESSION: NOT CHANGED

## 2018-08-07 ASSESSMENT — PAIN DESCRIPTION - PAIN TYPE: TYPE: ACUTE PAIN

## 2018-08-07 ASSESSMENT — PAIN DESCRIPTION - LOCATION: LOCATION: BACK

## 2018-08-07 ASSESSMENT — PAIN SCALES - GENERAL
PAINLEVEL_OUTOF10: 0
PAINLEVEL_OUTOF10: 4

## 2018-08-07 ASSESSMENT — PAIN DESCRIPTION - FREQUENCY: FREQUENCY: INTERMITTENT

## 2018-08-07 ASSESSMENT — PAIN DESCRIPTION - ONSET: ONSET: GRADUAL

## 2018-08-07 ASSESSMENT — PAIN DESCRIPTION - ORIENTATION: ORIENTATION: LEFT;POSTERIOR

## 2018-08-07 ASSESSMENT — ACTIVITIES OF DAILY LIVING (ADL): EFFECT OF PAIN ON DAILY ACTIVITIES: MINIMAL

## 2018-08-07 NOTE — PROGRESS NOTES
Mobility  Functional - Mobility Device: Rolling Walker (with R yogesh )  Assist Level: Contact guard assistance  Functional Mobility Comments: short distance mobility in room to w/c using RW. pt transported to therapy gym in w/c for time management     Transfers  Sit to stand: Contact guard assistance (chair, w/c)  Stand to sit: Minimal assistance (decreased eccentric control (recliner, chair, w/c))     Neuromuscular education:  - completed active elbow flexion then extension with no shoulder flexion- requiring tactile cuing and pt moving through active resistance from therapist to increase active elbow extension x10 reps, support provided at posterior and proximal to elbow to isolate elbow extension  -  Bilateral shoulder flexion AROM with forward reach into elbow extension while bilaterally holding theraball x12 with cues and facilitation provided to decrease R shoulder elevation (seated_  - bilateral grasp of 2# dowel low rows with scapular retraction/protraction with resistance provided from therapist through dowel mimi to increase active elbow extension, cues to increase  of R hand on dowel- progressed from sitting to static standing then sustained elbow extension with changing L LE MADELYN and requiring min-cga for dynamic standing balance  - active digit extension then tip pinch with wrist extension to retrieve jenga piece x6 reps with R UE then cross midline to place on table with pt noting his own compensatory shoulder movements and attempting to decrease without cuing        Assessment   Performance deficits / Impairments: Decreased functional mobility ; Decreased ADL status; Decreased strength;Decreased ROM; Decreased balance;Decreased endurance;Decreased coordination;Decreased safe awareness;Decreased high-level IADLs;Decreased fine motor control  Assessment: pt progressing with strength and coordination in R UE, requires cuing and assist to decrease compensatory shoulder movement.  pt had one LOB requiring min A to correct during standing task limited by decreased R LE coordination. Pt continues to benefit from OT treatment. Continue per POC  Treatment Diagnosis: Impaired ADLs/transfers and functional mobility, decrease RUE AROM, strength and coordination  Patient Education: neuro-ed, transfers, fine motor- verb understanding  REQUIRES OT FOLLOW UP: Yes  Activity Tolerance  Activity Tolerance: Patient Tolerated treatment well  Safety Devices  Safety Devices in place: Not Applicable (pt working with PT in therapy gym)          Plan   Plan  Times per week: 5x per week x75 minutes  Times per day: Daily  Plan weeks: 2-3 weeks  Current Treatment Recommendations: Strengthening, ROM, Balance Training, Functional Mobility Training, Safety Education & Training, Pain Management, Endurance Training, Neuromuscular Re-education, Patient/Caregiver Education & Training, Equipment Evaluation, Education, & procurement, Modalities (comment), Manual Therapy:  STM, Home Management Training, Self-Care / ADL, Cognitive/Perceptual Training  G-Code     OutComes Score                                           AM-PAC Score             Goals  Short term goals  Time Frame for Short term goals: 2 weeks   Short term goal 1: Pt will be min A with toilet transfer to demo improved standing balance and R side strength - progressing, continue  Short term goal 2: Pt will be CGA for toileting to demo improved dynamic standing balance and functional use of R hand - progressing, continue  Short term goal 3: Pt will min A for LE dressing to demo improved use of yogesh-technique strategies. - goal met 8/2  Short term goal 4: Pt will be SBA/SPV for UE dressing to demo improved use of yogesh-technique strategies and functional use of RUE - goal met 8/1  Short term goal 5: Pt will be SBA for grooming routine in stance to demo improved dynamic standing  - goal met 8/4  Long term goals  Time Frame for Long term goals : 3 weeks  Long term goal 1: Pt will be MOD I with

## 2018-08-07 NOTE — PROGRESS NOTES
Physical Therapy  Facility/Department: [facility] ACUTE REHAB UNIT  Daily Treatment Note  NAME: Girish Rice  : 1950  MRN: [id number]    Date of Service: 2018    Discharge Recommendations:  Continue to assess pending progress, Outpatient PT, 24 hour supervision or assist   PT Equipment Recommendations  Other: MAY NEED AFO. Patient Diagnosis(es): There were no encounter diagnoses. has a past medical history of Asthma; BPH; Cataract; Cerebral artery occlusion with cerebral infarction ([facility] [address] Depression; GERD (gastroesophageal reflux disease); Hyperlipidemia; Hypertension; and Type 2 diabetes mellitus ([facility] [address] has a past surgical history that includes Lumbar disc surgery; Nasal septum surgery; Spine surgery (); knee surgery (); eye surgery; Colonoscopy; and other surgical history (N/A, 2016). Restrictions  Restrictions/Precautions  Required Braces or Orthoses?: Yes  Required Braces or Orthoses  Right Lower Extremity Brace: Knee Brace  Position Activity Restriction  Other position/activity restrictions: Up with assist  Subjective   General  Chart Reviewed: Yes  Additional Pertinent Hx: Pt is a 71 yo male who presented to the ED on  with R sided weakness. Patient was discharged from the hospital 6 days ago after being admitted following acute CVA with residual right-sided weakness, slurring of his speech and right-sided facial droop. Family / Caregiver Present: No  Referring Practitioner: Dr. Ivania Champion: Pt reports that he noticed increased movement in R foot with toes moving   General Comment  Comments: Patient seated in arm chair at bedside upon arrival - agreeable to PT.   Pain Screening  Patient Currently in Pain: Denies  Vital Signs  Patient Currently in Pain: Denies       Orientation  Orientation  Overall Orientation Status: Within Normal Limits  Objective   Bed mobility  Bridging: Independent  Rolling to Left: Independent  Rolling to Right: surfaces. Ongoing  Long term goals  Time Frame for Long term goals : 3 weeks   Long term goal 1: Ind with all bed mobility skills. Ongoing  Long term goal 2: MI with amb on level surfaces > ta=863' at a time. Ongoing  Long term goal 3: MI with amb up and down 12 steps with use of a hand rail . Ongoing  Patient Goals   Patient goals : To get both the R arm and leg stronger. (especially the R arm to assist with maneuvering the RW more effectively)    Plan    Plan  Times per week: 5 out of 7 days x 60 minutes   Times per day: Daily  Specific instructions for Next Treatment: standing therex; progress gait   Current Treatment Recommendations: Strengthening, Balance Training, Endurance Training, Functional Mobility Training, Transfer Training, Gait Training, Stair training, Patient/Caregiver Education & Training, Neuromuscular Re-education, ROM, Home Exercise Program, Safety Education & Training, Equipment Evaluation, Education, & procurement, Positioning, Manual Therapy - Soft Tissue Mobilization, Modalities, ADL/Self-care Training, IADL Training  Safety Devices  Type of devices: Left in chair, Call light within reach, Chair alarm in place, All fall risk precautions in place, Patient at risk for falls  Restraints  Initially in place: No     Therapy Time   Individual Concurrent Group Co-treatment   Time In 0930         Time Out 1015         Minutes 45         Timed Code Treatment Minutes: [address] Time:   Individual Concurrent Group Co-treatment   Time In 1115         Time Out 1145         Minutes 30           Timed Code Treatment Minutes: 45+30     Total Treatment Minutes:  [facility], PT

## 2018-08-07 NOTE — PROGRESS NOTES
deficits noted but pt has consistently reported this to be baseline (reported self-diagnosis of ADD).  Pt endorses belief his cognitive skills are at baseline and desires to prioritize dysarthria tx with ST.      Plan:  Continue as per POC     Interventions used this date:  [x] Speech/Language Treatment  [] Instruction in HEP  [] Dysphagia Treatment [x] Cognitive Treatment   [] Other:    Discharge recommendations:  [] Home independently  [x] Home with assistance []  24 hour supervision  [] ECF [] Other  Continued Tx Upon Discharge: ? [] Yes    [] No    [x] TBD based on progress while on ARU     [] Vital Stim indicated     [] Other:   Estimated discharge date: 8/12/18      Electronically signed by  Jenanette Diaz MA CCC-SLP; [id number]  Speech-Language Pathologist

## 2018-08-07 NOTE — PROGRESS NOTES
strength - progressing, continue  Short term goal 2: Pt will be CGA for toileting to demo improved dynamic standing balance and functional use of R hand - progressing, continue  Short term goal 3: Pt will min A for LE dressing to demo improved use of yogesh-technique strategies. - goal met 8/2  Short term goal 4: Pt will be SBA/SPV for UE dressing to demo improved use of yogesh-technique strategies and functional use of RUE - goal met 8/1  Short term goal 5: Pt will be SBA for grooming routine in stance to demo improved dynamic standing  - goal met 8/4  Long term goals  Time Frame for Long term goals : 3 weeks  Long term goal 1: Pt will be MOD I with toilet transfer to demo improved standing balance and R side strength - progressing, continue  Long term goal 2: Pt will be MOD I for toileting to demo improved dynamic standing balance and functional use of R hand - progressing, continue  Long term goal 3: Pt will SBA  for LE dressing to demo improved use of yogesh-technique strategies. - progressing, continue  Long term goal 4: Pt will be MOD I for UE dressing to demo improved use of yogesh-technique strategies and functional use of RUE - progressing, continue  Long term goal 5: Pt will be MOD I for grooming routine in stance to demo improved dynamic standing balance/tolerance - progressing, continue  Patient Goals   Patient goals :  \"To get my right side back\"        Therapy Time   Individual Concurrent Group Co-treatment   Time In 0730         Time Out 0830         Minutes 60         Timed Code Treatment Minutes: [address],

## 2018-08-08 PROCEDURE — 97110 THERAPEUTIC EXERCISES: CPT | Performed by: PHYSICAL THERAPIST

## 2018-08-08 PROCEDURE — 97116 GAIT TRAINING THERAPY: CPT | Performed by: PHYSICAL THERAPIST

## 2018-08-08 PROCEDURE — 6370000000 HC RX 637 (ALT 250 FOR IP): Performed by: PHYSICAL MEDICINE & REHABILITATION

## 2018-08-08 PROCEDURE — 6360000002 HC RX W HCPCS: Performed by: PHYSICAL MEDICINE & REHABILITATION

## 2018-08-08 PROCEDURE — 1280000000 HC REHAB R&B

## 2018-08-08 PROCEDURE — 97535 SELF CARE MNGMENT TRAINING: CPT

## 2018-08-08 PROCEDURE — 97530 THERAPEUTIC ACTIVITIES: CPT | Performed by: PHYSICAL THERAPIST

## 2018-08-08 PROCEDURE — 92507 TX SP LANG VOICE COMM INDIV: CPT

## 2018-08-08 RX ORDER — PREDNISONE 10 MG/1
10 TABLET ORAL DAILY
Status: COMPLETED | OUTPATIENT
Start: 2018-08-12 | End: 2018-08-12

## 2018-08-08 RX ORDER — PREDNISONE 20 MG/1
20 TABLET ORAL DAILY
Status: COMPLETED | OUTPATIENT
Start: 2018-08-11 | End: 2018-08-11

## 2018-08-08 RX ORDER — PREDNISONE 20 MG/1
40 TABLET ORAL DAILY
Status: COMPLETED | OUTPATIENT
Start: 2018-08-09 | End: 2018-08-09

## 2018-08-08 RX ADMIN — FLUTICASONE PROPIONATE 1 SPRAY: 50 SPRAY, METERED NASAL at 09:35

## 2018-08-08 RX ADMIN — LOSARTAN POTASSIUM 100 MG: 100 TABLET ORAL at 09:29

## 2018-08-08 RX ADMIN — METHOCARBAMOL 750 MG: 750 TABLET ORAL at 09:33

## 2018-08-08 RX ADMIN — ATORVASTATIN CALCIUM 80 MG: 80 TABLET, FILM COATED ORAL at 20:33

## 2018-08-08 RX ADMIN — PREDNISONE 50 MG: 20 TABLET ORAL at 10:20

## 2018-08-08 RX ADMIN — STANDARDIZED SENNA CONCENTRATE AND DOCUSATE SODIUM 2 TABLET: 8.6; 5 TABLET, FILM COATED ORAL at 20:34

## 2018-08-08 RX ADMIN — AMLODIPINE BESYLATE 10 MG: 10 TABLET ORAL at 09:27

## 2018-08-08 RX ADMIN — METHOCARBAMOL 750 MG: 750 TABLET ORAL at 20:33

## 2018-08-08 RX ADMIN — GLIPIZIDE 5 MG: 5 TABLET ORAL at 09:27

## 2018-08-08 RX ADMIN — METOPROLOL TARTRATE 75 MG: 50 TABLET ORAL at 09:31

## 2018-08-08 RX ADMIN — METHOCARBAMOL 750 MG: 750 TABLET ORAL at 13:18

## 2018-08-08 RX ADMIN — HYDROCHLOROTHIAZIDE 25 MG: 25 TABLET ORAL at 09:30

## 2018-08-08 RX ADMIN — PIOGLITAZONE 15 MG: 15 TABLET ORAL at 09:26

## 2018-08-08 RX ADMIN — ASPIRIN 81 MG: 81 TABLET, COATED ORAL at 09:28

## 2018-08-08 RX ADMIN — METOPROLOL TARTRATE 75 MG: 50 TABLET ORAL at 20:34

## 2018-08-08 RX ADMIN — STANDARDIZED SENNA CONCENTRATE AND DOCUSATE SODIUM 2 TABLET: 8.6; 5 TABLET, FILM COATED ORAL at 09:31

## 2018-08-08 RX ADMIN — GUAIFENESIN 1200 MG: 600 TABLET, EXTENDED RELEASE ORAL at 09:34

## 2018-08-08 RX ADMIN — TAMSULOSIN HYDROCHLORIDE 0.4 MG: 0.4 CAPSULE ORAL at 20:34

## 2018-08-08 RX ADMIN — METFORMIN HYDROCHLORIDE 2000 MG: 500 TABLET, EXTENDED RELEASE ORAL at 17:31

## 2018-08-08 RX ADMIN — ENOXAPARIN SODIUM 40 MG: 40 INJECTION SUBCUTANEOUS at 09:20

## 2018-08-08 RX ADMIN — LANSOPRAZOLE 15 MG: 15 CAPSULE, DELAYED RELEASE ORAL at 06:41

## 2018-08-08 RX ADMIN — BACLOFEN 10 MG: 10 TABLET ORAL at 20:33

## 2018-08-08 ASSESSMENT — PAIN DESCRIPTION - ORIENTATION
ORIENTATION: LEFT
ORIENTATION: LEFT

## 2018-08-08 ASSESSMENT — PAIN DESCRIPTION - ONSET
ONSET: GRADUAL
ONSET: PROGRESSIVE

## 2018-08-08 ASSESSMENT — PAIN DESCRIPTION - LOCATION
LOCATION: SHOULDER;HIP
LOCATION: SHOULDER
LOCATION: SHOULDER

## 2018-08-08 ASSESSMENT — ACTIVITIES OF DAILY LIVING (ADL): EFFECT OF PAIN ON DAILY ACTIVITIES: MINIMAL

## 2018-08-08 ASSESSMENT — PAIN DESCRIPTION - PROGRESSION
CLINICAL_PROGRESSION: GRADUALLY WORSENING
CLINICAL_PROGRESSION: NOT CHANGED
CLINICAL_PROGRESSION: NOT CHANGED

## 2018-08-08 ASSESSMENT — PAIN SCALES - GENERAL
PAINLEVEL_OUTOF10: 4
PAINLEVEL_OUTOF10: 6
PAINLEVEL_OUTOF10: 0
PAINLEVEL_OUTOF10: 0
PAINLEVEL_OUTOF10: 8
PAINLEVEL_OUTOF10: 0

## 2018-08-08 ASSESSMENT — PAIN DESCRIPTION - DESCRIPTORS
DESCRIPTORS: ACHING;DULL
DESCRIPTORS: ACHING;DULL

## 2018-08-08 ASSESSMENT — PAIN DESCRIPTION - PAIN TYPE
TYPE: ACUTE PAIN
TYPE: ACUTE PAIN;NEUROPATHIC PAIN;CHRONIC PAIN
TYPE: ACUTE PAIN

## 2018-08-08 ASSESSMENT — PAIN DESCRIPTION - FREQUENCY
FREQUENCY: INTERMITTENT

## 2018-08-08 NOTE — PATIENT CARE CONFERENCE
wheelchair at least 150 feet with an ambulatory device, orthosis or prosthesis OR requires extra amount of time OR there is concern for safety  Distance Traveled in Wheel Chair: 150'  Stairs: 4- Minimal Contact Assistance Perfoms 75% or more of the effort to go up and down one flight of stairs    PT Equipment Recommendations  Equipment Needed: Yes  Other: Note, pt presents with R foot drop limiting his safety with safely ambulating. PT is pursuing a trial with a R AFO to assess its effectiveness    Assessment: Pt appeared extremely frustrated on this date / to the multiple painful sites which affected his overall mobility. Pt is also frustrated that his RUE/LE is not improving as fast as he was hoping. Pt is well below baseline and would benefit from continued therapy to increase his independence during all functional mobility to allow a safer return to home. SPEECH THERAPY: (intentionally left blank if not actively being seen by this service)   Diet Level: DIET CARDIAC;  FIMS:  Comprehension: 6 - Complex ideas 90% or device (hearing aid/glasses)  Expression: 6 - Device used to express complex ideas/needs  Social Interaction: 6 - Patient requires medication for mood and/or effect  Problem Solvin - Patient able to solve simple/routine tasks  Memory: 7 - Patient independent with meds/people/schedule    Assessment: Speech Therapy Diagnosis  Cognitive Diagnosis: Pt presents with mild cognitive-linguistic deficits characterized by deficits in memory, problem solving and executive functioning. Speech Diagnosis: Pt presents with mild dysarthria characterized by slurred speech that worsens after talking for longer periods of time and difficulty with pronunciation of multi-syllabic words. Mild dysarthria. Progressing towards goals. Cognition appears functional at this time and most likely deficits noted are r/t pt's baseline attention deficits.     OCCUPATIONAL THERAPY:  FIMS:  Eatin - Feeds self with side back\"    Rehab Team Goals for patient for the Upcoming Week:  1. Increase independence w/ ADLs  2. Increase functional use of RUE     Rehab Team Members in attendance for Team Conference:  :  Henna Mejía    Nurse Manager:  Pablito Oh, MSN, RN    Therapy Manager:  Danae Clemente, PT, DPT    Social Work:  Mary Garg LSW    Nursing: Zaida Sarabia, RN  Marlene Lewis, BSN, RN  Krupa Ramirez, RN    Therapy:  Mustapha Klein, PT  Ana Bergman, PT, DPT  Kayla Treadwell, PT, DPT    Aristides Gu, OTR/L  [facility], OTR/L    Pete Bacon MA/CCC-SLP    Nutrition:  Gaby Atkins, RD LD    I approve the established interdisciplinary plan of care as documented within the medical record of Afshinjareth Campos. MD Signature Darryn Lema MD 8/9/2018, 3:26 PM

## 2018-08-08 NOTE — PROGRESS NOTES
Occupational Therapy  Facility/Department: [facility] ACUTE REHAB UNIT  Daily Treatment Note  NAME: Kamla Marin  : 1950  MRN: [id number]    Date of Service: 2018    Discharge Recommendations:  Outpatient OT, Home with assist PRN  OT Equipment Recommendations  Equipment Needed: Yes  Other: shower chair - TBD    Patient Diagnosis(es): There were no encounter diagnoses. has a past medical history of Asthma; BPH; Cataract; Cerebral artery occlusion with cerebral infarction ([facility] [address] Depression; GERD (gastroesophageal reflux disease); Hyperlipidemia; Hypertension; and Type 2 diabetes mellitus ([facility] [address] has a past surgical history that includes Lumbar disc surgery; Nasal septum surgery; Spine surgery (); knee surgery (); eye surgery; Colonoscopy; and other surgical history (N/A, 2016). Restrictions  Restrictions/Precautions  Required Braces or Orthoses?: Yes  Required Braces or Orthoses  Right Lower Extremity Brace: Knee Brace  Position Activity Restriction  Other position/activity restrictions: Up with assist, as of 2018. pt is now allowed to be in untethered from the wall in the w/c on the unit    Subjective   General  Chart Reviewed: Yes  Patient assessed for rehabilitation services?: Yes  Additional Pertinent Hx: 70 y.o. M admitted  with right sided weakness and facial droop. Head CT= Infarct left melba from scan on , no acute hemorrhage. CXR (-) for acute findings. Recent admit ~6 days prior for acute CVA, was d/c'ed home. PMHx= BPH, cataract, cerebral artery occlusion, depression, HTN, DM. Pt admitted to ARU on   Response to previous treatment: Patient with no complaints from previous session  Family / Caregiver Present: No  Referring Practitioner: Dr. Cathi Newell  Diagnosis: CVA  Subjective  Subjective: Pt with BLEs hanging off of bed on arrival \"I got stuck\". Pt reported trying to get to EOB. Reminded pt to notify staff when wanting to sit EOB/get OOB.  Verb Plan   Plan  Times per week: 5x per week x75 minutes  Times per day: Daily  Plan weeks: 2-3 weeks  Current Treatment Recommendations: Strengthening, ROM, Balance Training, Functional Mobility Training, Safety Education & Training, Pain Management, Endurance Training, Neuromuscular Re-education, Patient/Caregiver Education & Training, Equipment Evaluation, Education, & procurement, Modalities (comment), Manual Therapy:  STM, Home Management Training, Self-Care / ADL, Cognitive/Perceptual Training    Goals  Short term goals  Time Frame for Short term goals: 2 weeks   Short term goal 1: Pt will be min A with toilet transfer to demo improved standing balance and R side strength - goal met 8/8  Short term goal 2: Pt will be CGA for toileting to demo improved dynamic standing balance and functional use of R hand - goal met 8/8  Short term goal 3: Pt will min A for LE dressing to demo improved use of yogesh-technique strategies. - goal met 8/2  Short term goal 4: Pt will be SBA/SPV for UE dressing to demo improved use of yogesh-technique strategies and functional use of RUE - goal met 8/1  Short term goal 5: Pt will be SBA for grooming routine in stance to demo improved dynamic standing  - goal met 8/4  Long term goals  Time Frame for Long term goals : 3 weeks  Long term goal 1: Pt will be MOD I with toilet transfer to demo improved standing balance and R side strength - progressing, continue  Long term goal 2: Pt will be MOD I for toileting to demo improved dynamic standing balance and functional use of R hand - progressing, continue  Long term goal 3: Pt will SBA  for LE dressing to demo improved use of yogesh-technique strategies.  - progressing, continue  Long term goal 4: Pt will be MOD I for UE dressing to demo improved use of yogesh-technique strategies and functional use of RUE - progressing, continue  Long term goal 5: Pt will be MOD I for grooming routine in stance to demo improved dynamic standing balance/tolerance -

## 2018-08-08 NOTE — PROGRESS NOTES
Functions, Activity limitations: Decreased functional mobility ; Decreased endurance;Decreased balance;Decreased strength;Decreased vision/visual deficit; Decreased ROM; Decreased coordination  Assessment: Pt appeared extremely frustrated on this date 2/2 to the multiple painful sites which affected his overall mobility. Pt is also frustrated that his RUE/LE is not improving as fast as he was hoping. Pt is well below baseline and would benefit from continued therapy to increase his independence during all functional mobility to allow a safer return to home. Treatment Diagnosis: Difficulty with functional mobility 2/2 CVA  Patient Education: Cont with transf training, gt training, and  bed mobility skills   REQUIRES PT FOLLOW UP: Yes  Activity Tolerance  Activity Tolerance: Patient limited by endurance; Patient limited by fatigue;Patient Tolerated treatment well;Patient limited by pain     G-Code     OutComes Score                                                    AM-PAC Score             Goals  Short term goals  Time Frame for Short term goals: 10 days   Short term goal 1: MI with all bed mobility skills. Ongoing  Short term goal 2: Pt will transfer sit <> stand  with Mod I. Ongoing  Short term goal 3: Pt will ambulate 150' with LRAD and supervision on level surfaces. Ongoing  Long term goals  Time Frame for Long term goals : 3 weeks   Long term goal 1: Ind with all bed mobility skills. Ongoing  Long term goal 2: MI with amb on level surfaces > qg=046' at a time. Ongoing  Long term goal 3: MI with amb up and down 12 steps with use of a hand rail . Ongoing  Patient Goals   Patient goals : To get both the R arm and leg stronger. (especially the R arm to assist with maneuvering the RW more effectively)    Plan    Plan  Times per week: 5 out of 7 days x 60 minutes   Times per day: Daily  Specific instructions for Next Treatment: standing therex; progress gait   Current Treatment Recommendations: Strengthening, Balance

## 2018-08-08 NOTE — PROGRESS NOTES
Pt up to chair, watching television. Complain of mild shoulder soreness. Assessment completed. Nighttime medications given. Assisted pt to the bathroom with standby assistance, gait belt and walker. Pt voided and had a bowel movement then returned to bed. Assisted pt with putting on hospital gown. Reminded pt to call for assistance with any needs. Call light within reach. Safety measures in place. No additional needs at this time.

## 2018-08-08 NOTE — PROGRESS NOTES
ACUTE REHAB UNIT  SPEECH/LANGUAGE PATHOLOGY      [x] Daily  [] Weekly Care Conference Note  [] Discharge    Patient:Sanket Elias      JVL:[birth year]  [facility]:[id number]  Rehab Dx/Hx: Acute ischemic stroke ([facility] [address] [I63.9]    Precautions: [] Aspiration  [x] Fall risk  [] Sternal  [] Seizure [] Hip  [] Weight Bearing [] Other  Lives With: Spouse  ST Dx: [] Aphasia  [x] Dysarthria  [] Apraxia   [] Oropharyngeal dysphagia [x] Cognitive Impairment  [] Other:   Date of Admit: 7/30/2018  Room #: 3103/3103-01  Date: 8/8/2018          Current Diet Order:DIET CARDIAC; Recommended Form of Meds: Whole with water  Compensatory Swallowing Strategies: Alternate solids and liquids, Check for pocketing of food on the Right, Eat/Feed slowly, Lingual sweep, Upright as possible for all oral intake, Small bites/sips     Dentition: Adequate (missing one molar on bottom left)  Vision  Vision: Impaired  Vision Exceptions: Wears glasses for reading  Hearing  Hearing: Within functional limits  Barriers toward progress: baseline attention deficits per pt report      Date: 8/8/2018     Tx session 1   Total Timed Code Min 0   Total Treatment Minutes 30   Individual Treatment Minutes 30   Group Treatment Minutes 0   Co-Treat Minutes 0   Brief Exception: N/A   Pain 4/10 in shoulder   Pain Intervention: [] RN notified  [] Repositioned  [x] Intervention offered and patient declined  [] N/A  [x] Other: pt reported \"they're giving me a steroid at lunch\"     Subjective:     Pt up in chair with student RNs and clinical instructor present. Pt appeared in good spirits but reported \"rough\" morning d/t not sleeping well. Cooperative with all tasks. Objective / Goals:    Patient will utilize speech intelligibility strategies to produce multi-syllabic words and in conversation to increase intelligibility to >90%.      /st/ loaded sentences: 100% comprehensibility with min cues for short sentences (<10 words); 90% comprehensibility with min cues for lengthier sentences.    /sw/ loaded sentences: 100% comprehensibility with min cues for short sentences (<10 words); 100% comprehensibility independently for lengthier sentences. Pt will complete graded problem-solving tasks with 85% accuracy given min cues. Goal not directly targeted. Pt will complete executive function tasks (i.e. planning, deductive reasoning, inferential, functional organization tasks) with 80% accuracy independently. Goal not directly targeted. Pt will complete short-term memory tasks, of increasing length and complexity, with 80% accuracy or given min cues or use of compensatory strategies/aids. Goal not directly targeted. Pt independently recalled all staff names in addition to medication and schedule for medicine. Pt will tolerate NMES via VitalStim placement 4a for improved sensation/ROM/strength of right facial muscles. Goal not directly targeted. Pt completed oral motor exercises for labial ROM, buccal strengthening, and facial symmetry x 10 reps with visual feedback of mirror and modeling with verbal cues from SLP. Pt will complete additional cognitive assessment with goals to be added per POC as needed. Goal not targeted. Other areas targeted:    Education:   Educated pt on A&P of speech production, voice vs speech, rationale for tx tasks, dysarthria, speech intelligibility strategies, and recommended HEP for oral motor symmetry / strengthening. Safety Devices: [x] Call light within reach  [x] Chair alarm activated and connected to nurse call light system  [] Bed alarm activated   [x] Other: reinforced use of call light   Assessment: Mild dysarthria. Progressing towards goals.      Plan:  Continue as per POC     Interventions used this date:  [x] Speech/Language Treatment  [] Instruction in HEP  [] Dysphagia Treatment [] Cognitive Treatment   [] Other:    Discharge recommendations:  [] Home independently  [x] Home with assistance []  24 hour supervision  [] ECF

## 2018-08-08 NOTE — PROGRESS NOTES
Transfers: Contact guard assistance (with RW)  Device: Rolling Walker (With a R  hand adaptor)  Other Apparatus: Dorsiflex Assist (Ace wrap used for a DF assist)  Assistance: Stand by assistance  Distance: 150' x2, 220' x1  OT  PT Equipment Recommendations  Equipment Needed: Yes  Other: MAY NEED AFO. Toilet - Technique: Ambulating  Equipment Used: Standard toilet  Assessment        SLP  Current Diet : Regular  Current Liquid Diet : Thin  Diet Solids Recommendation: Regular  Liquid Consistency Recommendation: Thin    Body mass index is 29.52 kg/m². 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      HTN:  Some better with addition of b-blocker, continue to monitor. May increase dose if not more improved tm  - Continue norvasc, cozaar, hctz, lopressor     R - knee pain w/ remote hx of meniscal tear w/o surgical intervention:  Still complaining of pain, swelling, clicking, and locking of joint with full knee extension. Worse after therapy, limiting factor  - Obtained a wrap-around brace to be worn with therapy,   - Conservative management with compression, ice and elevation when possible   - s/p CSI     DM  - Actos, glipizide metformin; follow blood sugars.        Urinary retention  -  Flomax.     Bowels  - Schedule colace + senna. Follow bowel movements. Enema or suppository if needed.      Bladder  - Check PVR x 3. [place] if PVR > 200ml or if any volume is > 500 ml.      Sleep  - Trazodone provided prn     GERD  - Bringing home prevacid, ok to use. Neck pain  - Start steroid taper. Slade Britt MD 8/8/2018, 8:59 AM

## 2018-08-09 LAB
ANION GAP SERPL CALCULATED.3IONS-SCNC: 11 MMOL/L (ref 3–16)
BASOPHILS ABSOLUTE: 0.1 K/UL (ref 0–0.2)
BASOPHILS RELATIVE PERCENT: 0.5 %
BUN BLDV-MCNC: 29 MG/DL (ref 7–20)
CALCIUM SERPL-MCNC: 10.2 MG/DL (ref 8.3–10.6)
CHLORIDE BLD-SCNC: 96 MMOL/L (ref 99–110)
CO2: 31 MMOL/L (ref 21–32)
CREAT SERPL-MCNC: 0.9 MG/DL (ref 0.8–1.3)
EOSINOPHILS ABSOLUTE: 0.1 K/UL (ref 0–0.6)
EOSINOPHILS RELATIVE PERCENT: 0.8 %
GFR AFRICAN AMERICAN: >60
GFR NON-AFRICAN AMERICAN: >60
GLUCOSE BLD-MCNC: 111 MG/DL (ref 70–99)
HCT VFR BLD CALC: 37.1 % (ref 40.5–52.5)
HEMOGLOBIN: 12.5 G/DL (ref 13.5–17.5)
LYMPHOCYTES ABSOLUTE: 1.6 K/UL (ref 1–5.1)
LYMPHOCYTES RELATIVE PERCENT: 14.7 %
MAGNESIUM: 1.8 MG/DL (ref 1.8–2.4)
MCH RBC QN AUTO: 29.6 PG (ref 26–34)
MCHC RBC AUTO-ENTMCNC: 33.5 G/DL (ref 31–36)
MCV RBC AUTO: 88.3 FL (ref 80–100)
MONOCYTES ABSOLUTE: 1 K/UL (ref 0–1.3)
MONOCYTES RELATIVE PERCENT: 9.5 %
NEUTROPHILS ABSOLUTE: 8.2 K/UL (ref 1.7–7.7)
NEUTROPHILS RELATIVE PERCENT: 74.5 %
PDW BLD-RTO: 12.8 % (ref 12.4–15.4)
PLATELET # BLD: 307 K/UL (ref 135–450)
PMV BLD AUTO: 7.3 FL (ref 5–10.5)
POTASSIUM REFLEX MAGNESIUM: 3.5 MMOL/L (ref 3.5–5.1)
RBC # BLD: 4.21 M/UL (ref 4.2–5.9)
SODIUM BLD-SCNC: 138 MMOL/L (ref 136–145)
WBC # BLD: 10.9 K/UL (ref 4–11)

## 2018-08-09 PROCEDURE — 80048 BASIC METABOLIC PNL TOTAL CA: CPT

## 2018-08-09 PROCEDURE — 97116 GAIT TRAINING THERAPY: CPT | Performed by: PHYSICAL THERAPIST

## 2018-08-09 PROCEDURE — 97110 THERAPEUTIC EXERCISES: CPT | Performed by: PHYSICAL THERAPIST

## 2018-08-09 PROCEDURE — 36415 COLL VENOUS BLD VENIPUNCTURE: CPT

## 2018-08-09 PROCEDURE — 92507 TX SP LANG VOICE COMM INDIV: CPT

## 2018-08-09 PROCEDURE — 83735 ASSAY OF MAGNESIUM: CPT

## 2018-08-09 PROCEDURE — 85025 COMPLETE CBC W/AUTO DIFF WBC: CPT

## 2018-08-09 PROCEDURE — 97530 THERAPEUTIC ACTIVITIES: CPT

## 2018-08-09 PROCEDURE — 6360000002 HC RX W HCPCS: Performed by: PHYSICAL MEDICINE & REHABILITATION

## 2018-08-09 PROCEDURE — 97535 SELF CARE MNGMENT TRAINING: CPT

## 2018-08-09 PROCEDURE — 1280000000 HC REHAB R&B

## 2018-08-09 PROCEDURE — 97110 THERAPEUTIC EXERCISES: CPT

## 2018-08-09 PROCEDURE — 6370000000 HC RX 637 (ALT 250 FOR IP): Performed by: PHYSICAL MEDICINE & REHABILITATION

## 2018-08-09 RX ORDER — METOPROLOL TARTRATE 100 MG/1
100 TABLET ORAL 2 TIMES DAILY
Status: DISCONTINUED | OUTPATIENT
Start: 2018-08-09 | End: 2018-08-14 | Stop reason: HOSPADM

## 2018-08-09 RX ADMIN — HYDROCHLOROTHIAZIDE 25 MG: 25 TABLET ORAL at 10:36

## 2018-08-09 RX ADMIN — METFORMIN HYDROCHLORIDE 2000 MG: 500 TABLET, EXTENDED RELEASE ORAL at 17:16

## 2018-08-09 RX ADMIN — ENOXAPARIN SODIUM 40 MG: 40 INJECTION SUBCUTANEOUS at 10:35

## 2018-08-09 RX ADMIN — TAMSULOSIN HYDROCHLORIDE 0.4 MG: 0.4 CAPSULE ORAL at 20:04

## 2018-08-09 RX ADMIN — METOPROLOL TARTRATE 75 MG: 50 TABLET ORAL at 10:37

## 2018-08-09 RX ADMIN — BACLOFEN 10 MG: 10 TABLET ORAL at 20:04

## 2018-08-09 RX ADMIN — METHOCARBAMOL 750 MG: 750 TABLET ORAL at 14:42

## 2018-08-09 RX ADMIN — ATORVASTATIN CALCIUM 80 MG: 80 TABLET, FILM COATED ORAL at 20:04

## 2018-08-09 RX ADMIN — METHOCARBAMOL 750 MG: 750 TABLET ORAL at 20:04

## 2018-08-09 RX ADMIN — GUAIFENESIN 1200 MG: 600 TABLET, EXTENDED RELEASE ORAL at 10:36

## 2018-08-09 RX ADMIN — PIOGLITAZONE 15 MG: 15 TABLET ORAL at 10:42

## 2018-08-09 RX ADMIN — PREDNISONE 40 MG: 20 TABLET ORAL at 10:37

## 2018-08-09 RX ADMIN — METHOCARBAMOL 750 MG: 750 TABLET ORAL at 10:38

## 2018-08-09 RX ADMIN — STANDARDIZED SENNA CONCENTRATE AND DOCUSATE SODIUM 2 TABLET: 8.6; 5 TABLET, FILM COATED ORAL at 10:37

## 2018-08-09 RX ADMIN — ASPIRIN 81 MG: 81 TABLET, COATED ORAL at 10:37

## 2018-08-09 RX ADMIN — METOPROLOL TARTRATE 100 MG: 100 TABLET ORAL at 20:04

## 2018-08-09 RX ADMIN — LOSARTAN POTASSIUM 100 MG: 100 TABLET ORAL at 10:36

## 2018-08-09 RX ADMIN — GLIPIZIDE 5 MG: 5 TABLET ORAL at 10:37

## 2018-08-09 RX ADMIN — AMLODIPINE BESYLATE 10 MG: 10 TABLET ORAL at 10:36

## 2018-08-09 ASSESSMENT — PAIN SCALES - GENERAL
PAINLEVEL_OUTOF10: 0

## 2018-08-09 NOTE — PROGRESS NOTES
ACUTE REHAB UNIT  SPEECH/LANGUAGE PATHOLOGY      [x] Daily  [x] Weekly Care Conference Note  [] Discharge    Patient:Sanket Whitney      TFS:[birth year]  MMQ:[id number]  Rehab Dx/Hx: Acute ischemic stroke ([facility] [address] [I63.9]    Precautions: [] Aspiration  [x] Fall risk  [] Sternal  [] Seizure [] Hip  [] Weight Bearing [] Other  Lives With: Spouse  ST Dx: [] Aphasia  [x] Dysarthria  [] Apraxia   [] Oropharyngeal dysphagia [x] Cognitive Impairment  [] Other:   Date of Admit: 7/30/2018  Room #: 3103/3103-01  Date: 8/9/2018          Current Diet Order:DIET CARDIAC; Recommended Form of Meds: Whole with water  Compensatory Swallowing Strategies: Alternate solids and liquids, Check for pocketing of food on the Right, Eat/Feed slowly, Lingual sweep, Upright as possible for all oral intake, Small bites/sips     Dentition: Adequate (missing one molar on bottom left)  Vision  Vision: Impaired  Vision Exceptions: Wears glasses for reading  Hearing  Hearing: Within functional limits  Barriers toward progress: baseline attention deficits per pt report      Date: 8/9/2018      Tx session 1 Weekly Summary   Total Timed Code Min 0    Total Treatment Minutes 30    Individual Treatment Minutes 30    Group Treatment Minutes 0    Co-Treat Minutes 0    Brief Exception: N/A    Pain None indicated via any means; appeared comfortable throughout session    Pain Intervention: [] RN notified  [] Repositioned  [] Intervention offered and patient declined  [x] N/A  [] Other:      Subjective:     Pt endorsed \"I feel energetic because you're my first one. \" Pt appeared in good spirits and reported having an enjoyable visit with his brother last night. Pt then became emotional and tearful when discussing the death of his brother's dtr (passed away 2 mo ago d/t overdose). Emotional support provided and referred pt for grief counseling and spiritual care.      Objective / Goals:     Patient will utilize speech intelligibility strategies to produce

## 2018-08-09 NOTE — CARE COORDINATION
Pt discussed in team conference today with D/C date of 8/14 to home with spouse. Pts progress with PT/OT/ST, nursing was reviewed. Discussed wifes concern about pt being able to be left alone and pt being able to get in and out of tub shower. Pts may need DME based on ability to get in and out of shower; may need shower chair or bench. SW contacted pt's wife about pt's anticipated D/C date of 8/14 and to discuss if DME's are recommended SW can assist.  Wife expressed pt would be going to oupt therapy at [facility]. SW will continue to assist with D/C needs to home.       Cherri Carmona, [facility]  O:  [phone] F:  [phone]
SW spoke with pt's wife to discuss pt's estimated D/c date for 8/12. Wife expressed concerns about pt returning home on 8/12 and is not sure pt can safely return home. Sw invited wife into ARU to watch pt in therapy and talk with therapists about her concerns. Wife expressed she plans to come in to work with therapists for a safe d/c home. Wife concerned about how she is going to manage pt at home safely. SW will continue to assist with discharge planning and needs.       Vijay Nicholas [facility]  O:  [phone] F:  [phone]
Team conference held today. Estimated D/C date for 8/12. Discussed progress with therapy, barriers to discharge and plan to return home with spouse, Outpt PT/OT/ST and restart with [facility]. Will continue to follow for support and discharge planning.     Jocelyn Rawls, [place]  O:  [phone] F:  [phone]
[facility]  Home care at home? Yes Provider:  Benedicto Almanza Provider Phone: [phone]    Therapy Consults  PT evaluation needed?: Yes (Comment)  OT Evalulation Needed?: Yes (Comment)  SLP evaluation needed?: Yes (Comment)    [address]:   Potential Assistance Purchasing Medications:  No  Does patient want to participate in local refill/meds to beds program?: No    Goals of Care  Patient expects to be discharged to[de-identified] home  Patient plans for SNF: No     Mode of transport from hospital: spouse to provide transport     Barriers to discharge: do not anticipate any.      Carine Sanon [facility] [employer]  Case Management Department  Ph: [phone]

## 2018-08-09 NOTE — PROGRESS NOTES
Beth Sánchez  8/9/2018  [id number]    Chief Complaint: Left sided ischemic stroke    Subjective:   No problems overnight; stay extended until Tuesday of next week. ROS: Denies chest pain, dyspnea, fever, chills, or N/V    Objective:  Patient Vitals for the past 24 hrs:   BP Temp Temp src Pulse Resp SpO2 Weight   08/09/18 1030 (!) 159/69 98.7 °F (37.1 °C) Oral 81 16 97 % -   08/09/18 0710 - - - - - - 239 lb 3.2 oz (108.5 kg)   08/08/18 2015 (!) 160/70 97.4 °F (36.3 °C) Oral 78 16 96 % -     Gen: No distress, pleasant. HEENT: Normocephalic, atraumatic. CV: Regular rate and rhythm. Resp: No respiratory distress. Abd: Soft, nontender   Ext: Right knee joint warm and edematous. Clicking with full extension. Crepitus in knees bilaterally with flexion and extension  Neuro: Alert, oriented, appropriately interactive. R-hemiparesis     Wt Readings from Last 3 Encounters:   08/09/18 239 lb 3.2 oz (108.5 kg)   07/23/18 243 lb 13.3 oz (110.6 kg)   12/05/17 245 lb (111.1 kg)       Laboratory data:   Lab Results   Component Value Date    WBC 10.9 08/09/2018    HGB 12.5 (L) 08/09/2018    HCT 37.1 (L) 08/09/2018    MCV 88.3 08/09/2018     08/09/2018       Lab Results   Component Value Date     08/09/2018    K 3.5 08/09/2018    CL 96 08/09/2018    CO2 31 08/09/2018    BUN 29 08/09/2018    CREATININE 0.9 08/09/2018    GLUCOSE 111 08/09/2018    GLUCOSE 157 10/31/2011    CALCIUM 10.2 08/09/2018        Therapy progress:  PT  Required Braces or Orthoses  Right Lower Extremity Brace: Knee Brace  Position Activity Restriction  Other position/activity restrictions: Up with assist, as of 8/7/2018. pt is now allowed to be in untethered from the wall in the w/c on the unit  Objective     Sit to Stand: Stand by assistance (VC to coot to the edge of the seat.  Tranf from [facility] chair, w/c and mat )  Stand to sit: Stand by assistance (VC for aligning with the surface where he is descending)  Bed to Chair: Minimal is > 500 ml.      Sleep  - Trazodone provided prn     GERD  - Bringing home prevacid, ok to use. Neck pain  - Improving w/ steroid taper    Interdisciplinary team conference was held today with entire rehab treatment team including PT, OT, SLP, Dietician, RN, and SW. Discussion focused on progress toward rehab goals and discharge planning. Barriers: Weakness, balance, pain management. Total treatment time >35 min with greater than 50% spent in care coordination. Slade Curry MD 8/9/2018, 3:23 PM

## 2018-08-09 NOTE — PROGRESS NOTES
[facility] Pain Assessment  Patient Currently in Pain: Denies  Vital Signs  Patient Currently in Pain: Denies   Orientation  Orientation  Overall Orientation Status: Within Functional Limits  Objective    ADL  Feeding: Verbal cueing; Adaptive utensils (comment) (Functional use of RUE to use fork and knife to cut food. Pt provided w/ built up  for RUE. See below for more comments)  Grooming: Stand by assistance (Stance at sink to complete oral care. Pt able to use RUE w/ increased functional use this date in order to hold toothpaste and open cap. )  Toileting: Stand by assistance (Stance at toilet to urinate. Pt managed pants up and down. UE support on RW')  Additional Comments: OT issued built up  for improved grasp on fork. Simulated eating task completed while pt used fork and knife to cut theraputty into pieces. Pt unable to coordinate movement w/ RUE to cut putty w/ knife but was able to use fork in RUE to stabilize. Pt reports this is not typically how he does it and would normally cut w/ his R hand. Pt reported increased succes w/ built up  on fork vs w/o . OT instructed pt to trial use of  during meals and report back on if successful or if adapations are needed. Pt's wife also expressed concerns about pt's bathroom environment at home and reported concern that shower chair will not fit in the tub. OT instructed pt's wife to take pictures of bathroom layout in order to problem solve potential solutions. Balance  Sitting Balance: Modified independent   Standing Balance: Contact guard assistance (CGA-SBA)  Standing Balance  Time: 10 mins total  Activity: functional mobility to/from bathroom, functional mobility to/from gym, stance for grooming and toileting   Sit to stand: Contact guard assistance  Stand to sit: Stand by assistance  Functional Mobility  Functional - Mobility Device: Rolling Walker  Activity: To/from bathroom; Other (to gym)  Assist Level: Contact guard assistance

## 2018-08-09 NOTE — PROGRESS NOTES
arrival. Pt agreeable to PT. Pain Screening  Patient Currently in Pain: Denies (Reprts that the steroid that he was started on appears to control all of the pain that he had been experiencing the last few days. )  Vital Signs  Patient Currently in Pain: Denies (Reprts that the steroid that he was started on appears to control all of the pain that he had been experiencing the last few days. )       Orientation  Orientation  Overall Orientation Status: Within Normal Limits  Objective      Transfers  Sit to Stand: Stand by assistance (VC to coot to the edge of the seat. Tranf from [facility] chair, w/c and mat )  Stand to sit: Stand by assistance (VC for aligning with the surface where he is descending)  Ambulation  Ambulation?: Yes  WB Status: No WB restrictions noted in chart  More Ambulation?: Yes  Ambulation 1  Surface: level tile  Device: Gallegos rail (located on pt's R side)  Other Apparatus:  (Used a piece of material below R foot to provide a frictionless surface to help advance the R foot during swing through phase)  Assistance: Stand by assistance;Contact guard assistance  Quality of Gait: improved step though gt pattern,   still presents with instability with swing through 2/2 to the R foot not consistently clearing . Decreased HS and TO on the R  Distance: 295'   Comments: Both VC and TC req for an erect posture  Ambulation 2  Surface - 2: level tile  Device 2: Single point cane  Other Apparatus 2:  (Knee brace on the R  and material functioning as a slider)  Assistance 2: Contact guard assistance;Stand by assistance  Quality of Gait 2: Reciprocal gait   Distance: 185'x2  Comments: Improved fluency with walking with a SPC. Pt reported increased confidence  Stairs  # Steps : 16 (8+9)  Rails: Left ascending (L descending )  Assistance: Contact guard assistance;Stand by assistance  Comment: Non reciprocal pattern with increased difficulty \"clearing\" the R foot when ascending steps.    Wheelchair Activities  Wheelchair reactivated                          Assessment   Body structures, Functions, Activity limitations: Decreased functional mobility ; Decreased endurance;Decreased balance;Decreased strength;Decreased vision/visual deficit; Decreased ROM; Decreased coordination  Assessment: Pt initiated the am treament very emotional with tears but then appeared to improve as treatment progressed. Pt is well below baseline and would benefit from continued therapy to increase his independence during all functional mobility to allow a safer return to home. Treatment Diagnosis: Difficulty with functional mobility 2/2 CVA  Patient Education: Cont with transf training, gt training, and  bed mobility skills   REQUIRES PT FOLLOW UP: Yes  Activity Tolerance  Activity Tolerance: Patient limited by endurance; Patient limited by fatigue;Patient Tolerated treatment well     G-Code     OutComes Score                                                    AM-PAC Score             Goals  Short term goals  Time Frame for Short term goals: 10 days   Short term goal 1: MI with all bed mobility skills. Ongoing  Short term goal 2: Pt will transfer sit <> stand  with Mod I. Ongoing  Short term goal 3: Pt will ambulate 150' with LRAD and supervision on level surfaces. Ongoing  Long term goals  Time Frame for Long term goals : 3 weeks   Long term goal 1: Ind with all bed mobility skills. Ongoing  Long term goal 2: MI with amb on level surfaces > ng=247' at a time. Ongoing  Long term goal 3: MI with amb up and down 12 steps with use of a hand rail . Ongoing  Patient Goals   Patient goals : To get both the R arm and leg stronger. (especially the R arm to assist with maneuvering the RW more effectively)    Plan    Plan  Times per week: 5 out of 7 days x 60 minutes   Times per day: Daily  Specific instructions for Next Treatment: standing therex; progress gait   Current Treatment Recommendations: Strengthening, Balance Training, Endurance Training, Functional

## 2018-08-10 LAB
EKG ATRIAL RATE: 75 BPM
EKG DIAGNOSIS: NORMAL
EKG P AXIS: 56 DEGREES
EKG P-R INTERVAL: 194 MS
EKG Q-T INTERVAL: 394 MS
EKG QRS DURATION: 88 MS
EKG QTC CALCULATION (BAZETT): 439 MS
EKG R AXIS: -22 DEGREES
EKG T AXIS: 54 DEGREES
EKG VENTRICULAR RATE: 75 BPM

## 2018-08-10 PROCEDURE — 92507 TX SP LANG VOICE COMM INDIV: CPT

## 2018-08-10 PROCEDURE — 97116 GAIT TRAINING THERAPY: CPT | Performed by: PHYSICAL THERAPIST

## 2018-08-10 PROCEDURE — 6370000000 HC RX 637 (ALT 250 FOR IP): Performed by: PHYSICAL MEDICINE & REHABILITATION

## 2018-08-10 PROCEDURE — 1280000000 HC REHAB R&B

## 2018-08-10 PROCEDURE — 6360000002 HC RX W HCPCS: Performed by: PHYSICAL MEDICINE & REHABILITATION

## 2018-08-10 PROCEDURE — 97112 NEUROMUSCULAR REEDUCATION: CPT

## 2018-08-10 PROCEDURE — 97530 THERAPEUTIC ACTIVITIES: CPT

## 2018-08-10 PROCEDURE — 97110 THERAPEUTIC EXERCISES: CPT | Performed by: PHYSICAL THERAPIST

## 2018-08-10 PROCEDURE — 97110 THERAPEUTIC EXERCISES: CPT

## 2018-08-10 PROCEDURE — 97530 THERAPEUTIC ACTIVITIES: CPT | Performed by: PHYSICAL THERAPIST

## 2018-08-10 RX ORDER — LOPERAMIDE HYDROCHLORIDE 2 MG/1
2 CAPSULE ORAL 4 TIMES DAILY PRN
Status: DISCONTINUED | OUTPATIENT
Start: 2018-08-10 | End: 2018-08-14 | Stop reason: HOSPADM

## 2018-08-10 RX ADMIN — METHOCARBAMOL 750 MG: 750 TABLET ORAL at 10:26

## 2018-08-10 RX ADMIN — METFORMIN HYDROCHLORIDE 2000 MG: 500 TABLET, EXTENDED RELEASE ORAL at 16:34

## 2018-08-10 RX ADMIN — METHOCARBAMOL 750 MG: 750 TABLET ORAL at 14:00

## 2018-08-10 RX ADMIN — GLIPIZIDE 5 MG: 5 TABLET ORAL at 10:25

## 2018-08-10 RX ADMIN — PIOGLITAZONE 15 MG: 15 TABLET ORAL at 10:27

## 2018-08-10 RX ADMIN — LOSARTAN POTASSIUM 100 MG: 100 TABLET ORAL at 10:26

## 2018-08-10 RX ADMIN — HYDROCHLOROTHIAZIDE 25 MG: 25 TABLET ORAL at 10:25

## 2018-08-10 RX ADMIN — LANSOPRAZOLE 15 MG: 15 CAPSULE, DELAYED RELEASE ORAL at 07:04

## 2018-08-10 RX ADMIN — AMLODIPINE BESYLATE 10 MG: 10 TABLET ORAL at 10:26

## 2018-08-10 RX ADMIN — GUAIFENESIN 1200 MG: 600 TABLET, EXTENDED RELEASE ORAL at 10:27

## 2018-08-10 RX ADMIN — METOPROLOL TARTRATE 100 MG: 100 TABLET ORAL at 10:25

## 2018-08-10 RX ADMIN — LOPERAMIDE HYDROCHLORIDE 2 MG: 2 CAPSULE ORAL at 12:06

## 2018-08-10 RX ADMIN — ENOXAPARIN SODIUM 40 MG: 40 INJECTION SUBCUTANEOUS at 10:27

## 2018-08-10 RX ADMIN — BACLOFEN 10 MG: 10 TABLET ORAL at 22:05

## 2018-08-10 RX ADMIN — ASPIRIN 81 MG: 81 TABLET, COATED ORAL at 10:26

## 2018-08-10 RX ADMIN — ATORVASTATIN CALCIUM 80 MG: 80 TABLET, FILM COATED ORAL at 22:04

## 2018-08-10 RX ADMIN — PREDNISONE 30 MG: 20 TABLET ORAL at 10:25

## 2018-08-10 RX ADMIN — METOPROLOL TARTRATE 100 MG: 100 TABLET ORAL at 22:05

## 2018-08-10 RX ADMIN — METHOCARBAMOL 750 MG: 750 TABLET ORAL at 22:04

## 2018-08-10 RX ADMIN — TAMSULOSIN HYDROCHLORIDE 0.4 MG: 0.4 CAPSULE ORAL at 22:05

## 2018-08-10 ASSESSMENT — PAIN SCALES - GENERAL
PAINLEVEL_OUTOF10: 0

## 2018-08-10 NOTE — PROGRESS NOTES
goal 2: Pt will be CGA for toileting to demo improved dynamic standing balance and functional use of R hand - goal met 8/8  Short term goal 3: Pt will min A for LE dressing to demo improved use of yogesh-technique strategies. - goal met 8/2  Short term goal 4: Pt will be SBA/SPV for UE dressing to demo improved use of yogesh-technique strategies and functional use of RUE - goal met 8/1  Short term goal 5: Pt will be SBA for grooming routine in stance to demo improved dynamic standing  - goal met 8/4  Long term goals  Time Frame for Long term goals : 3 weeks  Long term goal 1: Pt will be MOD I with toilet transfer to demo improved standing balance and R side strength - progressing, continue  Long term goal 2: Pt will be MOD I for toileting to demo improved dynamic standing balance and functional use of R hand - progressing, continue  Long term goal 3: Pt will SBA  for LE dressing to demo improved use of yogesh-technique strategies. - progressing, continue  Long term goal 4: Pt will be MOD I for UE dressing to demo improved use of yogesh-technique strategies and functional use of RUE - progressing, continue  Long term goal 5: Pt will be MOD I for grooming routine in stance to demo improved dynamic standing balance/tolerance - progressing, continue  Patient Goals   Patient goals :  \"To get my right side back\"        Therapy Time   Individual Concurrent Group Co-treatment   Time In [facility]         Time Out 0950         Minutes 75         Timed Code Treatment Minutes: 76 Minutes       Josselyn Dutton

## 2018-08-10 NOTE — PROGRESS NOTES
ACUTE REHAB UNIT  SPEECH/LANGUAGE PATHOLOGY      [x] Daily  [] Weekly Care Conference Note  [] Discharge    Patient:Sanket Escobar Members      FBM:[birth year]  I:[id number]  Rehab Dx/Hx: Acute ischemic stroke ([facility] [address] [I63.9]    Precautions: [] Aspiration  [x] Fall risk  [] Sternal  [] Seizure [] Hip  [] Weight Bearing [] Other  Lives With: Spouse  ST Dx: [] Aphasia  [x] Dysarthria  [] Apraxia   [] Oropharyngeal dysphagia [x] Cognitive Impairment  [] Other:   Date of Admit: 7/30/2018  Room #: 3103/3103-01  Date: 8/10/2018          Current Diet Order:DIET CARDIAC; Recommended Form of Meds: Whole with water  Compensatory Swallowing Strategies: Alternate solids and liquids, Check for pocketing of food on the Right, Eat/Feed slowly, Lingual sweep, Upright as possible for all oral intake, Small bites/sips     Dentition: Adequate (missing one molar on bottom left)  Vision  Vision: Impaired  Vision Exceptions: Wears glasses for reading  Hearing  Hearing: Within functional limits  Barriers toward progress: baseline attention deficits per pt report      Date: 8/10/2018     Tx session 1   Total Timed Code Min 0   Total Treatment Minutes 30   Individual Treatment Minutes 30   Group Treatment Minutes 0   Co-Treat Minutes 0   Brief Exception: N/A   Pain \"ok - nothing today\"   Pain Intervention: [] RN notified  [] Repositioned  [] Intervention offered and patient declined  [x] N/A  [] Other:     Subjective:     Pt up in chair upon entry. Pt c/o impaired vocal quality this date and \"dryness\" - mild roughness noted. Pt also c/o some fatigue d/t \"up a lot last night. \" Pleasant and cooperative for session. Pt independently recalled current medications, potential contraindications / side effects, and rationale for use. When asked to rate the impairment / difficulty of his speech, pt identified it at 6/10 (10 = most severe) for unfamiliar listeners and endorsed speaking tasks to be more effortful.      Objective / Goals:    Patient will utilize speech intelligibility strategies to produce multi-syllabic words and in conversation to increase intelligibility to >90%. Picture description with mirror for visual feedback: 90% intelligibility with min-no cues    /sp/ loaded sentences (no visual feedback):  -short (<10 words): 88% intelligibility with min cues  -lengthier (>10 words): 90% intelligibility with min cues     Oral reading - paragraph level: >90% intelligibility that decreased to 80% intelligibility at end of paragraph. Suspect fatigue. Pt will complete graded problem-solving tasks with 85% accuracy given min cues. GOAL MET   Pt will complete executive function tasks (i.e. planning, deductive reasoning, inferential, functional organization tasks) with 80% accuracy independently. GOAL MET   Pt will complete short-term memory tasks, of increasing length and complexity, with 80% accuracy or given min cues or use of compensatory strategies/aids. GOAL MET   Pt will tolerate NMES via VitalStim placement 4a for improved sensation/ROM/strength of right facial muscles. GOAL MET   Pt will complete additional cognitive assessment with goals to be added per POC as needed. GOAL MET   Other areas targeted: Oral motor exercises completed for improved facial symmetry and ROM for facial expressions. Pt completed labial ROM and buccal strengthening exercises x 10 with mirror for feedback and min cues from SLP for form. Education:   Educated pt to dysarthria, rationale for tx tasks, use of oral motor exercises to improve facial symmetry, and speaking tasks to address dysarthria for HEP. Safety Devices: [x] Call light within reach  [x] Chair alarm activated and connected to nurse call light system  [] Bed alarm activated   [x] Other: reinforced use of call light   Assessment: Mild dysarthria. Progressing towards goals. Pt self-reports speech as effortful when communicating with unfamiliar listeners 2/2 dysarthria.      Plan:  Continue as per POC     Interventions used this date:  [x] Speech/Language Treatment  [] Instruction in HEP  [] Dysphagia Treatment [] Cognitive Treatment   [] Other:    Discharge recommendations:  [] Home independently  [x] Home with assistance []  24 hour supervision  [] ECF [] Other  Continued Tx Upon Discharge: ? [] Yes    [] No    [x] TBD based on progress while on ARU     [] Vital Stim indicated     [] Other:   Estimated discharge date: 8/14/18      Electronically signed by  Fernando Coffman MA CCC-SLP; [id number]  Speech-Language Pathologist

## 2018-08-10 NOTE — FLOWSHEET NOTE
08/09/18 1300   Encounter Summary   Volunteer Visit (Received communion from Fr. Jesusita Addison )   Sacraments   Communion Patient received communkitty

## 2018-08-10 NOTE — PROGRESS NOTES
Physical Therapy  Facility/Department: [facility] ACUTE REHAB UNIT  Daily Treatment Note  NAME: Edgar Fuentes  : 1950  MRN: [id number]    Date of Service: 8/10/2018    Discharge Recommendations:  Home with Home health PT, Home with assist PRN   PT Equipment Recommendations  Other: Note, pt presents with R foot drop limiting his safety with safely ambulating. Patient Diagnosis(es): There were no encounter diagnoses. has a past medical history of Asthma; BPH; Cataract; Cerebral artery occlusion with cerebral infarction ([address] Depression; GERD (gastroesophageal reflux disease); Hyperlipidemia; Hypertension; and Type 2 diabetes mellitus ([address] has a past surgical history that includes Lumbar disc surgery; Nasal septum surgery; Spine surgery (); knee surgery (); eye surgery; Colonoscopy; and other surgical history (N/A, 2016). Restrictions  Restrictions/Precautions  Required Braces or Orthoses?: Yes  Required Braces or Orthoses  Right Lower Extremity Brace: Knee Brace  Position Activity Restriction  Other position/activity restrictions: Up with assist, as of 2018. pt is now allowed to be in untethered from the wall in the w/c on the unit  Subjective   General  Chart Reviewed: Yes  Additional Pertinent Hx: Pt is a 71 yo male who presented to the ED on  with R sided weakness. Patient was discharged from the hospital 6 days ago after being admitted following acute CVA with residual right-sided weakness, slurring of his speech and right-sided facial droop. Family / Caregiver Present: Yes (Duaghter and grand children)  Referring Practitioner: Dr. Inna Kaiser: Patient seated in BS  chair upon  PT arrival. Pt agreeable to PT.   Pain Screening  Patient Currently in Pain: Denies  Vital Signs  Patient Currently in Pain: Denies       Orientation  Orientation  Overall Orientation Status: Within Normal Limits  Objective      Transfers  Sit to Stand: Supervision;Stand by assistance (VC to scoot to the edge of the seat and hand placement now that pt is using a SPC. Rodney from [facility] chair, w/c and commode)  Stand to sit: Supervision (VC for aligning with the surface where he is descending and [facility])  Ambulation  Ambulation?: Yes  WB Status: No WB restrictions noted in chart  More Ambulation?: Yes  Ambulation 1  Surface: level tile  Device: Single point cane  Other Apparatus: AFO (a Toe Off prefabricated AFO trialed on this date. However, it was a large and the recommendation is a XL to accommodate pt's foot( size14) )  Assistance: Contact guard assistance;Minimal assistance  Quality of Gait: Poor R swing through with decreased clearing of R foot resulting in pt compensating with additional knee movements to swing R LE through(stopped after 1 walk since 2 LOB occurred with R toe drag)  Distance: 165'  Comments: Both VC and TC req for an erect posture  Ambulation 2  Surface - 2: level tile  Device 2: Single point cane  Other Apparatus 2:  (Used a piece of material wrapped on  R foot to provide a frictionless surface to help advance the R foot during swing through phase)  Assistance 2: Contact guard assistance;Stand by assistance  Quality of Gait 2: Reciprocal gait pattern( decreased balance with congested areas and pt WS too far)  Distance: 185'x 1, 130' x1  Comments: Improved fluency with walking with a SPC. Pt reported increased confidence  Stairs  # Steps : 16 (8+9)  Rails: Left ascending (L descending )  Assistance: Contact guard assistance  Comment: Non reciprocal pattern with increased difficulty \"clearing\" the R foot when ascending steps. (pt incontinent of bladder on steps resulting in increased anxiety and decreased balance)                  Comment: Pt incontinent of bladder and req a change in clothes. Pt sat on commode and was able to lower and remove wet clothing. Pt was ind with pericar and washing LES off.  Pt req A with threading the RLE through the pant leg but was ind with the L pant leg. Pt pulled up on the GB and was able to assist with pulling pants/briefs up. Pt's shoes and socks were also removed via pt with A only req to get the sock off of the RLE    Pt instructed with using a 4 \" stool and performing the following ex in sittin. Alternating toe tapping  2. Alternating heel tapping   3. Combination of alternating heel/toe tapping   Pt yulisa 10 reps of each ex. PT facilitated increased R ankle strategies via placing a styrofoam cup under the L foot and instructing pt to retrieve objects from the floor forcing most of the weight to the RLE. (challenging activity for pt)     Assessment   Body structures, Functions, Activity limitations: Decreased functional mobility ; Decreased endurance;Decreased balance;Decreased strength;Decreased vision/visual deficit; Decreased ROM; Decreased coordination  Assessment: The AFO was ineffective 2/2 to it being the wrong size. The XL is anticipated to be here on Monday. Pt was upset by the incontinence and appeared less careful as evidenced by the increased nummer of LOB. Pt is well below baseline and would benefit from continued therapy to increase his independence during all functional mobility to allow a safer return to home. Treatment Diagnosis: Difficulty with functional mobility 2/2 CVA  Patient Education: Cont with transf training, gt training, and  bed mobility skills   REQUIRES PT FOLLOW UP: Yes  Activity Tolerance  Activity Tolerance: Patient limited by endurance; Patient limited by fatigue;Patient Tolerated treatment well     G-Code     OutComes Score                                                    AM-PAC Score             Goals  Short term goals  Time Frame for Short term goals: 10 days   Short term goal 1: MI with all bed mobility skills. Ongoing  Short term goal 2: Pt will transfer sit <> stand  with Mod I. Ongoing  Short term goal 3: Pt will ambulate 150' with LRAD and supervision on level surfaces.

## 2018-08-10 NOTE — PROGRESS NOTES
Pt up to chair, awake watching television. No complaints. Assessment completed. Nighttime medications given. Reminded pt to call for assistance with any needs. Call light within reach. Safety measures in place. No needs at this time.

## 2018-08-11 PROCEDURE — 6360000002 HC RX W HCPCS: Performed by: PHYSICAL MEDICINE & REHABILITATION

## 2018-08-11 PROCEDURE — 6370000000 HC RX 637 (ALT 250 FOR IP): Performed by: PHYSICAL MEDICINE & REHABILITATION

## 2018-08-11 PROCEDURE — 1280000000 HC REHAB R&B

## 2018-08-11 RX ADMIN — METHOCARBAMOL 750 MG: 750 TABLET ORAL at 21:08

## 2018-08-11 RX ADMIN — GUAIFENESIN 1200 MG: 600 TABLET, EXTENDED RELEASE ORAL at 09:34

## 2018-08-11 RX ADMIN — AMLODIPINE BESYLATE 10 MG: 10 TABLET ORAL at 09:34

## 2018-08-11 RX ADMIN — BACLOFEN 10 MG: 10 TABLET ORAL at 21:08

## 2018-08-11 RX ADMIN — METOPROLOL TARTRATE 100 MG: 100 TABLET ORAL at 09:35

## 2018-08-11 RX ADMIN — ATORVASTATIN CALCIUM 80 MG: 80 TABLET, FILM COATED ORAL at 21:08

## 2018-08-11 RX ADMIN — ENOXAPARIN SODIUM 40 MG: 40 INJECTION SUBCUTANEOUS at 09:34

## 2018-08-11 RX ADMIN — GLIPIZIDE 5 MG: 5 TABLET ORAL at 09:34

## 2018-08-11 RX ADMIN — LOSARTAN POTASSIUM 100 MG: 100 TABLET ORAL at 09:34

## 2018-08-11 RX ADMIN — PREDNISONE 20 MG: 20 TABLET ORAL at 09:34

## 2018-08-11 RX ADMIN — LANSOPRAZOLE 15 MG: 15 CAPSULE, DELAYED RELEASE ORAL at 07:11

## 2018-08-11 RX ADMIN — HYDROCHLOROTHIAZIDE 25 MG: 25 TABLET ORAL at 09:34

## 2018-08-11 RX ADMIN — METHOCARBAMOL 750 MG: 750 TABLET ORAL at 09:34

## 2018-08-11 RX ADMIN — METOPROLOL TARTRATE 100 MG: 100 TABLET ORAL at 21:08

## 2018-08-11 RX ADMIN — PIOGLITAZONE 15 MG: 15 TABLET ORAL at 09:34

## 2018-08-11 RX ADMIN — METHOCARBAMOL 750 MG: 750 TABLET ORAL at 15:51

## 2018-08-11 RX ADMIN — ASPIRIN 81 MG: 81 TABLET, COATED ORAL at 09:34

## 2018-08-11 RX ADMIN — METFORMIN HYDROCHLORIDE 2000 MG: 500 TABLET, EXTENDED RELEASE ORAL at 17:05

## 2018-08-11 RX ADMIN — TAMSULOSIN HYDROCHLORIDE 0.4 MG: 0.4 CAPSULE ORAL at 21:08

## 2018-08-11 ASSESSMENT — PAIN SCALES - GENERAL
PAINLEVEL_OUTOF10: 0

## 2018-08-11 NOTE — PROGRESS NOTES
Pt BLE are more swollen today. RLE more swollen than LLE. Warm to touch, skin tight, heels and toes are red and blanchable. Pt denies pain at this time, elsi hose in place. Dr. Arias Iglesias stated to get bilateral dopplers.

## 2018-08-11 NOTE — PROGRESS NOTES
Pt is awake in bed and A&O x4. Vitals are stable and pt denied any pain at this time. Pt took all medications without difficulty. Pt denied any other needs at this time.

## 2018-08-12 PROCEDURE — 1280000000 HC REHAB R&B

## 2018-08-12 PROCEDURE — 6370000000 HC RX 637 (ALT 250 FOR IP): Performed by: PHYSICAL MEDICINE & REHABILITATION

## 2018-08-12 PROCEDURE — 6360000002 HC RX W HCPCS: Performed by: PHYSICAL MEDICINE & REHABILITATION

## 2018-08-12 RX ADMIN — METOPROLOL TARTRATE 100 MG: 100 TABLET ORAL at 21:08

## 2018-08-12 RX ADMIN — METOPROLOL TARTRATE 100 MG: 100 TABLET ORAL at 07:50

## 2018-08-12 RX ADMIN — AMLODIPINE BESYLATE 10 MG: 10 TABLET ORAL at 07:50

## 2018-08-12 RX ADMIN — BACLOFEN 10 MG: 10 TABLET ORAL at 21:07

## 2018-08-12 RX ADMIN — PIOGLITAZONE 15 MG: 15 TABLET ORAL at 07:50

## 2018-08-12 RX ADMIN — TAMSULOSIN HYDROCHLORIDE 0.4 MG: 0.4 CAPSULE ORAL at 21:07

## 2018-08-12 RX ADMIN — GUAIFENESIN 1200 MG: 600 TABLET, EXTENDED RELEASE ORAL at 07:49

## 2018-08-12 RX ADMIN — LOSARTAN POTASSIUM 100 MG: 100 TABLET ORAL at 07:49

## 2018-08-12 RX ADMIN — METFORMIN HYDROCHLORIDE 2000 MG: 500 TABLET, EXTENDED RELEASE ORAL at 17:22

## 2018-08-12 RX ADMIN — STANDARDIZED SENNA CONCENTRATE AND DOCUSATE SODIUM 2 TABLET: 8.6; 5 TABLET, FILM COATED ORAL at 21:08

## 2018-08-12 RX ADMIN — ENOXAPARIN SODIUM 40 MG: 40 INJECTION SUBCUTANEOUS at 07:49

## 2018-08-12 RX ADMIN — ATORVASTATIN CALCIUM 80 MG: 80 TABLET, FILM COATED ORAL at 21:07

## 2018-08-12 RX ADMIN — PREDNISONE 10 MG: 10 TABLET ORAL at 07:51

## 2018-08-12 RX ADMIN — ASPIRIN 81 MG: 81 TABLET, COATED ORAL at 07:49

## 2018-08-12 RX ADMIN — HYDROCHLOROTHIAZIDE 25 MG: 25 TABLET ORAL at 07:49

## 2018-08-12 RX ADMIN — METHOCARBAMOL 750 MG: 750 TABLET ORAL at 07:49

## 2018-08-12 RX ADMIN — METHOCARBAMOL 750 MG: 750 TABLET ORAL at 14:30

## 2018-08-12 RX ADMIN — GLIPIZIDE 5 MG: 5 TABLET ORAL at 07:50

## 2018-08-12 RX ADMIN — METHOCARBAMOL 750 MG: 750 TABLET ORAL at 21:08

## 2018-08-12 ASSESSMENT — PAIN SCALES - GENERAL
PAINLEVEL_OUTOF10: 0

## 2018-08-12 NOTE — PROGRESS NOTES
Pt is awake and up in the chair, A&O x4. Vitals are stable, pt denied any pain at this time. Pt took all medications without difficulty. Helped pt change into gown and transfer to bed. No other needs at this time.

## 2018-08-12 NOTE — PLAN OF CARE
Problem: ACTIVITY INTOLERANCE/IMPAIRED MOBILITY  Goal: Mobility/activity is maintained at optimum level for patient    Intervention: ASSESS FOR BARRIERS TO MOBILITY/ACTIVITY  Pt's  to right hand moderate (improved) and right plantar & dorsiflexion continues to be weakness. Pt's comprehensible though continues to be slightly slurred. Will continue to assess neurological status and notify MD of significant changes.
Problem: Falls - Risk of:  Goal: Will remain free from falls  Will remain free from falls   Outcome: Ongoing    Pt is a High fall risk. See Garret Left Fall Score and ABCDS Injury Risk assessments. High Fall Risk per LAMA/ABCDS: Explained fall risk precautions to pt and family and rationale behind their use to keep the patient safe. Pt bed is in low position, side rails up, call light and belongings are in reach. Fall wristband applied and present on pts wrist.  Bed alarm on. Pt encouraged to call for assistance. Will continue with hourly rounds for PO intake, pain needs, toileting and repositioning as needed.
Problem: Falls - Risk of:  Goal: Will remain free from falls  Will remain free from falls   Outcome: Ongoing   Pt is a High fall risk. See Charlee Flank Fall Score and ABCDS Injury Risk assessments. High Fall Risk per LAMA/ABCDS: Explained fall risk precautions to pt and family and rationale behind their use to keep the patient safe. Pt bed is in low position, side rails up, call light and belongings are in reach. Fall wristband applied and present on pts wrist.  Bed alarm on. Pt encouraged to call for assistance. Will continue with hourly rounds for PO intake, pain needs, toileting and repositioning as needed.
Problem: Falls - Risk of:  Goal: Will remain free from falls  Will remain free from falls   Outcome: Ongoing  Client remains free from falls, bed/chair alarm in place, door open, encouraged to use call light for needs, call light is within reach, bed locked in lowest position,  Will continue to monitor.
Problem: Falls - Risk of:  Goal: Will remain free from falls  Will remain free from falls   Outcome: Ongoing  Pt up assistance, gait belt and walker or cane. Pt with weakness to RUE and RLE. Fall precautions maintained. Fall risk armband on. Non skid footwear on. Bed in lowest position. Chair and beds alarm in used. Reminded pt to call for assistance before getting up. Call light within reach. Pt uses call light appropriately. No falls thus far during shift.
Problem: Falls - Risk of:  Goal: Will remain free from falls  Will remain free from falls   Outcome: Ongoing  Pt with no history of falls in last 6 months. Up with standby to contact guard assistance, gait belt and walker. Weakness to RLE & RUE. Fall precautions maintained. Non skid footwear on. Bed in lowest position. Chair and bed alarm used. Reminded pt to call for assistance before getting up. Call light within reach. Pt uses call light appropriately. No falls thus far during shift.
Problem: Falls - Risk of:  Goal: Will remain free from falls  Will remain free from falls   Outcome: Ongoing  Pt with no history of falls. Up with assistance, gait belt and walker. Weakness to RUE & RLE. Fall precautions maintained. Fall risk armband on. Non skid footwear on. Bed in lowest position. Bed alarm in use. Reminded pt to call for assistance before getting up. Call light within reach. No falls thus far during shift.
Problem: Falls - Risk of:  Goal: Will remain free from falls  Will remain free from falls   Outcome: Ongoing  Remains free from falls. Call light within reach and alarms in use at all times. SBA with walker and gait belt for transfers. Problem: ACTIVITY INTOLERANCE/IMPAIRED MOBILITY  Goal: Mobility/activity is maintained at optimum level for patient  Outcome: Ongoing  Continues to have limited movement to RUE and RLE due to stroke. R knee brace worn for additional support.
Problem: Falls - Risk of:  Goal: Will remain free from falls  Will remain free from falls   Pt remains free of falls. Pt is reminded to use call light for assistance and to not get up without help, hourly rounding being done and fall precautions in place. Pt in chair with call light and bedside table within reach.  Chair alarm on
Problem: Falls - Risk of:  Goal: Will remain free from falls  Will remain free from falls   Pt remains free of falls. Pt is reminded to use call light for assistance and to not get up without help. Hourly rounding being done and fall precautions in place. Pt in chair with call light and bedside table within reach.  Chair alarm on
Problem: Falls - Risk of:  Goal: Will remain free from falls  Will remain free from falls   Pt remains free of falls. Pt is reminded to use call light for assistance and to not get up without help. Hourly rounding being done and fall precautions in place. Pt in chair with call light and bedside table within reach. Pt uses call light appropriately. Call light and bedside table within reach.  Chair alarm on
Problem: Skin Integrity:  Goal: Skin integrity will stabilize  Skin integrity will stabilize   No skin breakdown noted. Pt has scattered bruising noted and redness to bilateral heels that is blanchable. Heels are being floated and pt encouraged to keep heels floated off bed at HS. Will continue to monitor    Problem: Falls - Risk of:  Goal: Will remain free from falls  Will remain free from falls   Pt remains free of fall.s Pt is reminded to use call light for assistance and to not get up without help. Hourly rounding being done and fall precautions in place.  Pt in chair with call light and bedside table within reach chair alarm on
Problem: Skin Integrity:  Goal: Skin integrity will stabilize  Skin integrity will stabilize   Outcome: Ongoing  See Buzz scale. Encourage/assist pt to turn and reposition every two hours and as needed. Heels elevated off bed. Protective barrier placed as needed. Patient kept clean and dry. Pillows used for positioning. Will continue to monitor for skin breakdown. Problem: Falls - Risk of:  Goal: Will remain free from falls  Will remain free from falls   Outcome: Ongoing  Client remains free from falls, bed/chair alarm in place, door open, encouraged to use call light for needs, call light is within reach, bed locked in lowest position,  Will continue to monitor. Client remains free from falls, bed/chair alarm in place, door open, encouraged to use call light for needs, call light is within reach, bed locked in lowest position,  Will continue to monitor.
Problem: Skin Integrity:  Goal: Skin integrity will stabilize  Skin integrity will stabilize   Pt has no skin breakdown noted. Scattered bruising noted. Heels are being floated and gets blanchable redness at times. Rey hose in place for swelling. Will continue to  monitor    Problem: Falls - Risk of:  Goal: Will remain free from falls  Will remain free from falls   Pt remains free of falls. Pt is reminded to use call light for assistance and to not get up without help. Hourly rounding being done and fall precautions in place. Pt in recliner with call light and bedside table within reach.  Chair alarm on
will be seen a minimum of 3 hours of therapy per day, a minimum of 5 out of 7 days per week  (please see above for specific treatment plan per PT/OT/SLP). [] In this rare instance due to the nature of this patient's medical involvement, this patient will be seen 15 hours per week (900 minutes within a 7 day period). In addition, dietician/nutritionist may monitor calorie count as well as intake and collaboratively work with SLP on dietary upgrades. Neuropsychology/Psychology may evaluate and provide necessary support. Medical issues being managed closely and that require 24 hour availability of a physician:   [] Swallowing Precautions  [x] Bowel/Bladder Fx  [] Weight bearing precautions   [x] Wound Care    [x] Pain Mgmt   [x] Infection Protection   [x] DVT Prophylaxis   [x] Fall Precautions  [x] Fluid/Electrolyte/Nutrition Balance   [] Voice Protection   [] Respiratory  [] Other:    Medical Prognosis: [x] Good  [] Fair    [] Guarded   Total expected IRF days 14  Anticipated discharge destination:    [] Home Independently   [x] Home Modified Independent  [] Home with supervision    []SNF     [] Other                                           Physician anticipated functional outcomes: Mod I for ADL, transfers, mobility, cognition  IPOC brief synthesis: 70year old male admitted to ARU to address strengthening, endurance, balance, gait, ADLs, assistive/adaptive device needs, patient/family training, speech, language, cognition. I have reviewed this initial plan of care and agree with its contents:    Title   Name    Date    Time    Physician: Michael Ochoa.  Stevie Herrera MD 8/1/2018, 1:46 PM     Case Mgmt: Larry Gonzalez, LSW  7/31/2018  1111    OT: Ivette Scanlon MSOT, OTR/L, C/NDT 7/31/2018 1623    PT: Lottie Oconnor LPT #[id number]   7/31/2018 1707    RN: Darlene Sanches RN    ST: DILLON [email or web address] [id number] [facility]  7/31/18 @ [phone]        : John Meyers 3598 8-1-18  Other:

## 2018-08-13 LAB
ANION GAP SERPL CALCULATED.3IONS-SCNC: 11 MMOL/L (ref 3–16)
BASOPHILS ABSOLUTE: 0.1 K/UL (ref 0–0.2)
BASOPHILS RELATIVE PERCENT: 0.5 %
BUN BLDV-MCNC: 22 MG/DL (ref 7–20)
CALCIUM SERPL-MCNC: 9.8 MG/DL (ref 8.3–10.6)
CHLORIDE BLD-SCNC: 99 MMOL/L (ref 99–110)
CO2: 29 MMOL/L (ref 21–32)
CREAT SERPL-MCNC: 0.7 MG/DL (ref 0.8–1.3)
EOSINOPHILS ABSOLUTE: 0.2 K/UL (ref 0–0.6)
EOSINOPHILS RELATIVE PERCENT: 2.3 %
GFR AFRICAN AMERICAN: >60
GFR NON-AFRICAN AMERICAN: >60
GLUCOSE BLD-MCNC: 94 MG/DL (ref 70–99)
HCT VFR BLD CALC: 38 % (ref 40.5–52.5)
HEMOGLOBIN: 13 G/DL (ref 13.5–17.5)
LYMPHOCYTES ABSOLUTE: 2.4 K/UL (ref 1–5.1)
LYMPHOCYTES RELATIVE PERCENT: 23.8 %
MCH RBC QN AUTO: 30.4 PG (ref 26–34)
MCHC RBC AUTO-ENTMCNC: 34.3 G/DL (ref 31–36)
MCV RBC AUTO: 88.5 FL (ref 80–100)
MONOCYTES ABSOLUTE: 0.8 K/UL (ref 0–1.3)
MONOCYTES RELATIVE PERCENT: 8.3 %
NEUTROPHILS ABSOLUTE: 6.5 K/UL (ref 1.7–7.7)
NEUTROPHILS RELATIVE PERCENT: 65.1 %
PDW BLD-RTO: 13.1 % (ref 12.4–15.4)
PLATELET # BLD: 280 K/UL (ref 135–450)
PMV BLD AUTO: 7.3 FL (ref 5–10.5)
POTASSIUM REFLEX MAGNESIUM: 3.6 MMOL/L (ref 3.5–5.1)
RBC # BLD: 4.29 M/UL (ref 4.2–5.9)
SODIUM BLD-SCNC: 139 MMOL/L (ref 136–145)
WBC # BLD: 10.1 K/UL (ref 4–11)

## 2018-08-13 PROCEDURE — 1280000000 HC REHAB R&B

## 2018-08-13 PROCEDURE — 85025 COMPLETE CBC W/AUTO DIFF WBC: CPT

## 2018-08-13 PROCEDURE — 80048 BASIC METABOLIC PNL TOTAL CA: CPT

## 2018-08-13 PROCEDURE — 97530 THERAPEUTIC ACTIVITIES: CPT | Performed by: PHYSICAL THERAPIST

## 2018-08-13 PROCEDURE — 97116 GAIT TRAINING THERAPY: CPT | Performed by: PHYSICAL THERAPIST

## 2018-08-13 PROCEDURE — 6360000002 HC RX W HCPCS: Performed by: PHYSICAL MEDICINE & REHABILITATION

## 2018-08-13 PROCEDURE — 6370000000 HC RX 637 (ALT 250 FOR IP): Performed by: PHYSICAL MEDICINE & REHABILITATION

## 2018-08-13 PROCEDURE — 36415 COLL VENOUS BLD VENIPUNCTURE: CPT

## 2018-08-13 PROCEDURE — 93970 EXTREMITY STUDY: CPT

## 2018-08-13 PROCEDURE — 97535 SELF CARE MNGMENT TRAINING: CPT

## 2018-08-13 PROCEDURE — 92507 TX SP LANG VOICE COMM INDIV: CPT

## 2018-08-13 RX ORDER — BACLOFEN 10 MG/1
10 TABLET ORAL NIGHTLY
Qty: 30 TABLET | Refills: 0 | Status: SHIPPED | OUTPATIENT
Start: 2018-08-13 | End: 2019-10-25 | Stop reason: ALTCHOICE

## 2018-08-13 RX ORDER — METOPROLOL TARTRATE 100 MG/1
100 TABLET ORAL 2 TIMES DAILY
Qty: 60 TABLET | Refills: 3 | Status: SHIPPED | OUTPATIENT
Start: 2018-08-13 | End: 2019-10-25 | Stop reason: ALTCHOICE

## 2018-08-13 RX ADMIN — METHOCARBAMOL 750 MG: 750 TABLET ORAL at 14:47

## 2018-08-13 RX ADMIN — ENOXAPARIN SODIUM 40 MG: 40 INJECTION SUBCUTANEOUS at 09:34

## 2018-08-13 RX ADMIN — PIOGLITAZONE 15 MG: 15 TABLET ORAL at 09:36

## 2018-08-13 RX ADMIN — ATORVASTATIN CALCIUM 80 MG: 80 TABLET, FILM COATED ORAL at 20:02

## 2018-08-13 RX ADMIN — METHOCARBAMOL 750 MG: 750 TABLET ORAL at 20:02

## 2018-08-13 RX ADMIN — GLIPIZIDE 5 MG: 5 TABLET ORAL at 09:36

## 2018-08-13 RX ADMIN — METFORMIN HYDROCHLORIDE 2000 MG: 500 TABLET, EXTENDED RELEASE ORAL at 17:48

## 2018-08-13 RX ADMIN — LOSARTAN POTASSIUM 100 MG: 100 TABLET ORAL at 09:36

## 2018-08-13 RX ADMIN — METOPROLOL TARTRATE 100 MG: 100 TABLET ORAL at 09:36

## 2018-08-13 RX ADMIN — AMLODIPINE BESYLATE 10 MG: 10 TABLET ORAL at 09:36

## 2018-08-13 RX ADMIN — METOPROLOL TARTRATE 100 MG: 100 TABLET ORAL at 20:02

## 2018-08-13 RX ADMIN — BACLOFEN 10 MG: 10 TABLET ORAL at 20:02

## 2018-08-13 RX ADMIN — HYDROCHLOROTHIAZIDE 25 MG: 25 TABLET ORAL at 09:36

## 2018-08-13 RX ADMIN — TAMSULOSIN HYDROCHLORIDE 0.4 MG: 0.4 CAPSULE ORAL at 20:02

## 2018-08-13 RX ADMIN — METHOCARBAMOL 750 MG: 750 TABLET ORAL at 09:36

## 2018-08-13 RX ADMIN — ASPIRIN 81 MG: 81 TABLET, COATED ORAL at 09:36

## 2018-08-13 RX ADMIN — GUAIFENESIN 1200 MG: 600 TABLET, EXTENDED RELEASE ORAL at 09:36

## 2018-08-13 ASSESSMENT — PAIN SCALES - GENERAL
PAINLEVEL_OUTOF10: 0

## 2018-08-13 NOTE — PROGRESS NOTES
Jen Phoenix  8/13/2018  [id number]    Chief Complaint: Left sided ischemic stroke    Subjective:   Trialing AFO today; planning on home tomorrow. ROS: Denies chest pain, dyspnea, fever, chills, or N/V    Objective:  Patient Vitals for the past 24 hrs:   BP Temp Temp src Pulse Resp SpO2 Weight   08/13/18 0930 (!) 146/74 97.5 °F (36.4 °C) Oral 60 18 96 % -   08/13/18 0741 - - - - - - 188 lb 4.4 oz (85.4 kg)   08/12/18 2059 (!) 158/70 97.5 °F (36.4 °C) Oral 60 18 97 % -     Gen: No distress, pleasant. HEENT: Normocephalic, atraumatic. CV: Regular rate and rhythm. Resp: No respiratory distress. Abd: Soft, nontender   Ext: Right knee joint warm and edematous. Clicking with full extension. Crepitus in knees bilaterally with flexion and extension  Neuro: Alert, oriented, appropriately interactive. R-hemiparesis     Wt Readings from Last 3 Encounters:   08/13/18 188 lb 4.4 oz (85.4 kg)   07/23/18 243 lb 13.3 oz (110.6 kg)   12/05/17 245 lb (111.1 kg)       Laboratory data:   Lab Results   Component Value Date    WBC 10.1 08/13/2018    HGB 13.0 (L) 08/13/2018    HCT 38.0 (L) 08/13/2018    MCV 88.5 08/13/2018     08/13/2018       Lab Results   Component Value Date     08/13/2018    K 3.6 08/13/2018    CL 99 08/13/2018    CO2 29 08/13/2018    BUN 22 08/13/2018    CREATININE 0.7 08/13/2018    GLUCOSE 94 08/13/2018    GLUCOSE 157 10/31/2011    CALCIUM 9.8 08/13/2018        Therapy progress:  PT  Required Braces or Orthoses  Right Lower Extremity Brace: Knee Brace  Position Activity Restriction  Other position/activity restrictions: Up with assist, as of 8/7/2018. pt is now allowed to be in untethered from the wall in the w/c on the unit  Objective     Sit to Stand: Supervision, Stand by assistance (VC to scoot to the edge of the seat and hand placement now that pt is using a SPC.   Rodney from [facility] chair, w/c and commode)  Stand to sit: Supervision (VC for aligning with the surface where he is

## 2018-08-13 NOTE — PROGRESS NOTES
Assessment  Patient Currently in Pain: Denies  Vital Signs  Patient Currently in Pain: Denies   Orientation  Orientation  Overall Orientation Status: Within Functional Limits  Objective    ADL  Feeding: Modified independent, increase time to manage containers/packages  Grooming: Stand by assistance, stance at sink for oral care and hair combing. Completed hand/face washing seated on TTB in shower  UE Bathing: Stand by assistance  LE Bathing: Contact guard assistance. Pt attempted to stand, required CGA to remain seated as patients feet were sliding. UE Dressing: Setup, demo'd good carryover of yogesh-technique  LE Dressing: Setup, assist with elsi hose. Pt able to tie shoes. VCs required for safety  Toileting: Unable to assess(comment) (no continent episode during OT session)     Functional Mobility  Functional - Mobility Device: Cane  Activity: To/from bathroom  Assist Level: SBA - Contact guard assistance (SBA to enter bathroom, CGA to exit d/t unsteadiness following shower)    Toilet Transfers  Toilet - Technique: Ambulating with SPC  Equipment Used: Standard toilet + grab bar  Toilet Transfer: Stand by assistance    Shower Transfers  Shower - Transfer From: [employer] - Transfer Type: To and From  Shower - Transfer To: Transfer tub bench  Shower - Technique: Ambulating  Shower Transfers: Minimal assistance; Moderate assistance (SBA to enter shower. CGA to exit with 1 LOB requiring MOD A to correct)      Cognition  Overall Cognitive Status: [facility]     Assessment   Performance deficits / Impairments: Decreased functional mobility ; Decreased ADL status; Decreased strength;Decreased ROM; Decreased balance;Decreased endurance;Decreased coordination;Decreased safe awareness;Decreased high-level IADLs;Decreased fine motor control  Assessment: Pt demo'd good progress towards goals meeting all short term goals and 1 LTGs. Goal achievement limited by R side weakness/hemiparesis and decrease dynamic standing balance.  Pt verb side strength - not met  Long term goal 2: Pt will be MOD I for toileting to demo improved dynamic standing balance and functional use of R hand - not met  Long term goal 3: Pt will SBA  for LE dressing to demo improved use of yogesh-technique strategies. - goal met 8/13  Long term goal 4: Pt will be MOD I for UE dressing to demo improved use of yogesh-technique strategies and functional use of RUE - not met  Long term goal 5: Pt will be MOD I for grooming routine in stance to demo improved dynamic standing balance/tolerance - not met  Patient Goals   Patient goals :  \"To get my right side back\"        Therapy Time   Individual Concurrent Group Co-treatment   Time In 0730         Time Out 0845         Minutes 75         Timed Code Treatment Minutes: 75 Minutes       Gilberto Patel OT

## 2018-08-13 NOTE — PROGRESS NOTES
that pt is using a SPC. Rodney from [facility] chair, bar height chair  and commode)  Stand to sit: Supervision (VC for aligning with the surface where he is descending and placeement of the [facility])  Ambulation  Ambulation?: Yes  WB Status: No WB restrictions noted in chart  More Ambulation?: Yes  Ambulation 1  Surface: level tile  Device: Single point cane (Pt used a [facility] cane as opposed to the [facility]))  Other Apparatus: AFO (a Toe Off prefabricated AFO trialed on this date. It was an XL. Following various approaches, PT added a 1/2\" wedge to the L shoe to facilitate  a more consistent swing through. gt pattern ( pt donned and doffed shoes 3x as PT trialed multipl  wedge)  Assistance: Contact guard assistance;Stand by assistance  Quality of Gait: Decreased  R swing through with decreased clearing of R foot which improved as treatment progressed   Distance: 350' x2( this included amb up and down the ramp(75' up and 76' down), 185' x2 [de-identified]' x1  Comments: PT instructed pt's wife and then had her practice the skill. Pt reported feeling confident with application of the gt belt, transf and gt . The [facility] cane appeared shorter than the [facility] so pt switched back to the [facility] for improved safety  Ambulation 2  Other Apparatus 2:  (Used a piece of material wrapped on  R foot to provide a frictionless surface to help advance the R foot during swing through phase)  Stairs  # Steps : 16 (8+8)  Stairs Height: 6\"  Rails: Left ascending (L descending )  Assistance: Stand by assistance  Comment: PT instructed pt's wife and then had her practice the skill. Pt reported feeling very confident on the steps                  Comment: PT instructed pt and pt's wife with floor<>mat transf. PT demonstrated and then instructed pt's wife to teach back. ( PT opted not to have pt practice this task 2/2 to the painful R knee and hip which are preexisting conditions) Pt's wife demo a clear understanding of the technique.                Assessment   Body structures, Functions, Activity limitations: Decreased functional mobility ; Decreased endurance;Decreased balance;Decreased strength;Decreased vision/visual deficit; Decreased ROM; Decreased coordination  Assessment: The XL AFO appears effective with the addition of a heel wedge placed in the L shoe to facilitate clearance of the R foot. PT focused on familiy training with wife on transf, gt  and donning the gt belt. Both pt and pt's wife verbalized understanding of the skills reviewed on this date. PT will reinforce skills tomorrow with pt's sched treatment. Pt is well below baseline and would benefit from continued therapy to increase his independence during all functional mobility to allow a safer return to home. Treatment Diagnosis: Difficulty with functional mobility 2/2 CVA  Patient Education: Cont with transf training, gt training, and  bed mobility skills   REQUIRES PT FOLLOW UP: Yes  Activity Tolerance  Activity Tolerance: Patient limited by endurance; Patient limited by fatigue;Patient Tolerated treatment well;Treatment limited secondary to medical complications (free text) (Pt had 2 episodes of decreased balance 2/2 to reports that he thought that his sugar dropped\" Pt was given a rest with a piece of candy in which pt recovered w/o incident)     G-Code     OutComes Score                                                    AM-PAC Score             Goals  Short term goals  Time Frame for Short term goals: 10 days   Short term goal 1: MI with all bed mobility skills. Ongoing  Short term goal 2: Pt will transfer sit <> stand  with Mod I. Ongoing  Short term goal 3: Pt will ambulate 150' with LRAD and supervision on level surfaces. Ongoing  Long term goals  Time Frame for Long term goals : 3 weeks   Long term goal 1: Ind with all bed mobility skills. Ongoing  Long term goal 2: MI with amb on level surfaces > wc=604' at a time.  Ongoing  Long term goal 3: MI with amb up and down 12 steps with use of a hand rail Audie Montes [place] Ongoing  Patient Goals   Patient goals : To get both the R arm and leg stronger. (especially the R arm to assist with maneuvering the RW more effectively)    Plan    Plan  Times per week: 5 out of 7 days x 60 minutes   Times per day: Daily  Specific instructions for Next Treatment: standing therex; progress gait   Current Treatment Recommendations: Strengthening, Balance Training, Endurance Training, Functional Mobility Training, Transfer Training, Gait Training, Stair training, Patient/Caregiver Education & Training, Neuromuscular Re-education, ROM, Home Exercise Program, Safety Education & Training, Equipment Evaluation, Education, & procurement, Positioning, Manual Therapy - Soft Tissue Mobilization, Modalities, ADL/Self-care Training, IADL Training  Plan Comment: Tentative d/c sched for tomorrow  Safety Devices  Type of devices: Left in chair, Call light within reach, Chair alarm in place, All fall risk precautions in place, Patient at risk for falls  Restraints  Initially in place: No     Therapy Time   Individual Concurrent Group Co-treatment   Time In 1245         Time Out 1415         Minutes 90         Timed Code Treatment Minutes: [address], PT

## 2018-08-13 NOTE — PROGRESS NOTES
strategies to produce multi-syllabic words and in conversation to increase intelligibility to >90%. Mild articulatory breakdowns noted during conversational tasks - min cues required to repair. Describing items with attributes: 80% intelligibility, improving to >90% with min cues for use of strategies. GOAL PARTIALLY MET - pt able to achieve >90% intelligibility but fluctuates at times with multi-syllabic words or extended speaking tasks. Pt will complete graded problem-solving tasks with 85% accuracy given min cues. GOAL MET GOAL MET   Pt will complete executive function tasks (i.e. planning, deductive reasoning, inferential, functional organization tasks) with 80% accuracy independently. GOAL MET GOAL MET   Pt will complete short-term memory tasks, of increasing length and complexity, with 80% accuracy or given min cues or use of compensatory strategies/aids. GOAL MET GOAL MET   Pt will tolerate NMES via VitalStim placement 4a for improved sensation/ROM/strength of right facial muscles. GOAL MET GOAL MET   Pt will complete additional cognitive assessment with goals to be added per POC as needed. GOAL MET GOAL MET   Other areas targeted: Oral motor exercises completed for improved facial symmetry and ROM for facial expressions. Pt completed labial ROM and buccal strengthening exercises x 10 with mirror for feedback and min cues from SLP for form. Pt reported completing exercises outside of tx \"when I think about it. \"      Education:   Educated pt to dysarthria, deficits noted, and d/c recommendations. Reviewed rationale for ongoing ST s/p d/c. Pt in agreement with this and endorsed desire to continue to work on speech as it continues to be \"effortful\" for him. Safety Devices: [x] Call light within reach  [x] Chair alarm activated and connected to nurse call light system  [] Bed alarm activated   [x] Other: reinforced use of call light    Assessment: Mild dysarthria.  Speech very comprehensible and communication functional but pt self-reports speech as effortful when communicating with unfamiliar listeners 2/2 dysarthria. Pt still with mild breakdowns in articulation with multi-syllabic words and extended speaking tasks. Plan:  Pt to d/c home tomorrow. Recommend ongoing ST to address dysarthria.       Interventions used this date:  [x] Speech/Language Treatment  [] Instruction in HEP  [] Dysphagia Treatment [] Cognitive Treatment   [] Other:    Discharge recommendations:  [] Home independently  [x] Home with assistance []  24 hour supervision  [] ECF [] Other  Continued Tx Upon Discharge: ? [x] Yes    [] No    [] TBD based on progress while on ARU     [] Vital Stim indicated     [] Other:   Estimated discharge date: 8/14/18      Electronically signed by  J Luis Bernal MA CCC-SLP; [id number]  Speech-Language Pathologist

## 2018-08-13 NOTE — PROGRESS NOTES
Pt assessment completed, see flowsheet. Pt A/O x 4  . Medications given per orders, see MAR. Denies further needs at this time. No distress noted. Call light within reach. Will continue to monitor.

## 2018-08-14 VITALS
HEART RATE: 60 BPM | RESPIRATION RATE: 18 BRPM | HEIGHT: 76 IN | SYSTOLIC BLOOD PRESSURE: 151 MMHG | DIASTOLIC BLOOD PRESSURE: 81 MMHG | BODY MASS INDEX: 29.85 KG/M2 | TEMPERATURE: 98.2 F | OXYGEN SATURATION: 99 % | WEIGHT: 245.15 LBS

## 2018-08-14 PROCEDURE — 97116 GAIT TRAINING THERAPY: CPT | Performed by: PHYSICAL THERAPIST

## 2018-08-14 PROCEDURE — 6360000002 HC RX W HCPCS: Performed by: PHYSICAL MEDICINE & REHABILITATION

## 2018-08-14 PROCEDURE — 97530 THERAPEUTIC ACTIVITIES: CPT | Performed by: PHYSICAL THERAPIST

## 2018-08-14 PROCEDURE — 6370000000 HC RX 637 (ALT 250 FOR IP): Performed by: PHYSICAL MEDICINE & REHABILITATION

## 2018-08-14 RX ADMIN — AMLODIPINE BESYLATE 10 MG: 10 TABLET ORAL at 08:28

## 2018-08-14 RX ADMIN — GLIPIZIDE 5 MG: 5 TABLET ORAL at 08:28

## 2018-08-14 RX ADMIN — METOPROLOL TARTRATE 100 MG: 100 TABLET ORAL at 08:28

## 2018-08-14 RX ADMIN — HYDROCHLOROTHIAZIDE 25 MG: 25 TABLET ORAL at 08:28

## 2018-08-14 RX ADMIN — PIOGLITAZONE 15 MG: 15 TABLET ORAL at 08:28

## 2018-08-14 RX ADMIN — FLUTICASONE PROPIONATE 1 SPRAY: 50 SPRAY, METERED NASAL at 08:29

## 2018-08-14 RX ADMIN — LOSARTAN POTASSIUM 100 MG: 100 TABLET ORAL at 08:28

## 2018-08-14 RX ADMIN — ENOXAPARIN SODIUM 40 MG: 40 INJECTION SUBCUTANEOUS at 08:27

## 2018-08-14 RX ADMIN — STANDARDIZED SENNA CONCENTRATE AND DOCUSATE SODIUM 2 TABLET: 8.6; 5 TABLET, FILM COATED ORAL at 08:28

## 2018-08-14 RX ADMIN — ASPIRIN 81 MG: 81 TABLET, COATED ORAL at 08:29

## 2018-08-14 RX ADMIN — METHOCARBAMOL 750 MG: 750 TABLET ORAL at 08:28

## 2018-08-14 ASSESSMENT — PAIN SCALES - GENERAL
PAINLEVEL_OUTOF10: 0
PAINLEVEL_OUTOF10: 0

## 2018-08-14 NOTE — DISCHARGE SUMMARY
trialed on this date. It was an XL. Following various approaches, PT added a 1/2\" wedge to the L shoe to facilitate  a more consistent swing through. gt pattern ( pt donned and doffed shoes 3x as PT trialed multipl  wedge)  Assistance: Contact guard assistance, Stand by assistance  Quality of Gait: Decreased  R swing through with decreased clearing of R foot which improved as treatment progressed   Distance: 350' x2( this included amb up and down the ramp(75' up and 76' down), 185' x2 [de-identified]' x1  Comments: PT instructed pt's wife and then had her practice the skill. Pt reported feeling confident with application of the gt belt, transf and gt . The [facility] cane appeared shorter than the [facility] so pt switched back to the [facility] for improved safety, Stairs  # Steps : 16 (8+8)  Stairs Height: 6\"  Rails: Left ascending (L descending )  Device: No Device  Assistance: Stand by assistance  Comment: PT instructed pt's wife and then had her practice the skill. Pt reported feeling very confident on the steps  Mobility: Bed, Chair, Wheel Chair: 4 - Requires steadying assistance only <25% assist  and/or requires assist with one leg only  Walk: 4 - Contact Guard/Minimal Assistance Requires up to Contact Guard or Minimal Assistance to walk/operate wheelchair at least 150 feet  Distance Walked: 350'  [facility]: 6 - [address] [place] [place] Walks/operates wheelchair at least 150 feet with an ambulatory device, orthosis or prosthesis OR requires extra amount of time OR there is concern for safety  Distance Traveled in Wheel Chair: 160'  Stairs: 5- Supervision Requires supervision(e.g., standing by, cuing, or coaxing) to go up and down one flight of stairs, PT Equipment Recommendations  Equipment Needed: Yes  Other: Note, pt presents with R foot drop limiting his safety with safely ambulating.  SPC is recommmended at this time, Assessment: The XL AFO appears effective with the addition of a heel wedge placed in the L shoe to facilitate clearance of

## 2018-08-14 NOTE — PROGRESS NOTES
Pt up to chair, watching television and on tablet. No complaints. Assessment completed. Assistance pt to the bathroom with contact guard assistance, gait belt and cane. Pt voided and had small loose bowel movement. Assisted pt back to chair. Nighttime medications given. Reminded pt to call for assistance with any needs. Call light within reach. Safety measures in place. No additional needs at this time.

## 2018-08-16 ENCOUNTER — HOSPITAL ENCOUNTER (OUTPATIENT)
Dept: SPEECH THERAPY | Age: 68
Setting detail: THERAPIES SERIES
Discharge: HOME | End: 2018-08-16
Payer: MEDICARE

## 2018-08-16 ENCOUNTER — HOSPITAL ENCOUNTER (OUTPATIENT)
Dept: PHYSICAL THERAPY | Age: 68
Setting detail: THERAPIES SERIES
Discharge: HOME | End: 2018-08-16
Payer: MEDICARE

## 2018-08-16 ENCOUNTER — HOSPITAL ENCOUNTER (OUTPATIENT)
Dept: OCCUPATIONAL THERAPY | Age: 68
Setting detail: THERAPIES SERIES
Discharge: HOME | End: 2018-08-16
Payer: MEDICARE

## 2018-08-16 PROCEDURE — G8988 SELF CARE GOAL STATUS: HCPCS

## 2018-08-16 PROCEDURE — G8978 MOBILITY CURRENT STATUS: HCPCS

## 2018-08-16 PROCEDURE — 97163 PT EVAL HIGH COMPLEX 45 MIN: CPT

## 2018-08-16 PROCEDURE — G8999 MOTOR SPEECH CURRENT STATUS: HCPCS

## 2018-08-16 PROCEDURE — 92523 SPEECH SOUND LANG COMPREHEN: CPT

## 2018-08-16 PROCEDURE — G9186 MOTOR SPEECH GOAL STATUS: HCPCS

## 2018-08-16 PROCEDURE — G8985 CARRY GOAL STATUS: HCPCS

## 2018-08-16 PROCEDURE — 97116 GAIT TRAINING THERAPY: CPT

## 2018-08-16 PROCEDURE — G8979 MOBILITY GOAL STATUS: HCPCS

## 2018-08-16 PROCEDURE — 97166 OT EVAL MOD COMPLEX 45 MIN: CPT

## 2018-08-16 PROCEDURE — G8987 SELF CARE CURRENT STATUS: HCPCS

## 2018-08-16 PROCEDURE — 97530 THERAPEUTIC ACTIVITIES: CPT

## 2018-08-16 PROCEDURE — 97110 THERAPEUTIC EXERCISES: CPT

## 2018-08-16 PROCEDURE — G8984 CARRY CURRENT STATUS: HCPCS

## 2018-08-16 ASSESSMENT — PAIN DESCRIPTION - LOCATION
LOCATION: KNEE
LOCATION: SHOULDER

## 2018-08-16 ASSESSMENT — PAIN DESCRIPTION - PAIN TYPE
TYPE: CHRONIC PAIN
TYPE: CHRONIC PAIN

## 2018-08-16 ASSESSMENT — PAIN DESCRIPTION - DESCRIPTORS
DESCRIPTORS: ACHING
DESCRIPTORS: ACHING;TINGLING

## 2018-08-16 ASSESSMENT — PAIN SCALES - GENERAL: PAINLEVEL_OUTOF10: 2

## 2018-08-16 ASSESSMENT — PAIN DESCRIPTION - ORIENTATION: ORIENTATION: RIGHT

## 2018-08-16 ASSESSMENT — PAIN DESCRIPTION - PROGRESSION: CLINICAL_PROGRESSION: GRADUALLY IMPROVING

## 2018-08-16 ASSESSMENT — PAIN DESCRIPTION - FREQUENCY: FREQUENCY: INTERMITTENT

## 2018-08-16 NOTE — PROGRESS NOTES
Quick DASH      Patient: Anni Chan  : 1950  MRN: [id number]  Date: 2018  Electronically Signed by: Lily Mitchell    Instructions:   · This Questionnaire asks about your symptoms as well as your ability to perform certain activities. · Please answer every question, based on your condition in the last week, by selecting the appropriate number. · If you did not have the opportunity to perform the activity in the past week, please make your best estimate of which response would be the most accurate. · It doesn't matter which hand or arm you use to perform the activity; please answer based on your ability regardless of how you perform the task. Please rate your ability to do the following activities in the last week by selecting the number below the appropriate response      No Difficulty Mild Difficulty Moderate Difficulty Severe Difficulty Unable   1. Open a tight or new jar    [x] 1  [] 2  [] 3  [] 4  [] 5   2. Do heavy household chores (e.g., wash walls, floors)  [] 1  [x] 2  [] 3  [] 4  [] 5   3. Luiza a shopping bag or briefcase  [x] 1  [] 2  [] 3  [] 4  [] 5   4. Wash your back    [] 1  [] 2  [] 3  [x] 4  [] 5   5. Use a knife to cut food    [] 1  [] 2  [x] 3  [] 4  [] 5   6. Recreational activities in which you take some force or impact through your arm, shoulder, or hand (e.g., golf, hammering, tennis, etc.)  [] 1  [] 2    [x] 3  [] 4  [] 5      Not At All  Slightly Moderately Quite A Bit  Extremely   7. During the past week, to what extent has your arm, shoulder or hand problem interfered with your normal social activities with family, friends, neighbors or groups? [] 1  [] 2  [x] 3  [] 4  [] 5      Not Limited At All Slightly Limited  Moderately Limited Very Limited  Unable   8. During the past week, were you limited in your work or other regular daily activities as a result of your arm, shoulder or hand problem?   [] 1  [] 2  [x] 3  [] 4  [] 5     Please rate the

## 2018-08-16 NOTE — PROGRESS NOTES
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 Objective      ADL  Additional Comments: Has not showered d/t TTB not yet delivered. Pt has assist with elsi hose. Coordination  Movements Are Fluid And Coordinated: No  Coordination and Movement description: Right UE;Fine motor impairments;Gross motor impairments;Decreased speed;Decreased accuracy     Nine Hole Peg Test (stance, R hand): pt able to place 6 pegs in 3 minutes  Box and Blocks Test (stance, R hand): 15 blocks in 1 minute  Management of Every Day Containers: unsupported stance to manipulate lids off/on with L hand (dominant) while R hand grasped/release containers. Pt managed 5 containers in 1 minute and 36 seconds with assist to stabilize 3/5 containers. Pt demo'd difficulty with larger width containers.       Functional Mobility  Functional - Mobility Device: Cane  Assist Level: Contact guard assistance  Functional Mobility Comments: Has R AFO, reported has LOB at home but no falls     Cognition  Overall Cognitive Status: WFL     Sensation  Overall Sensation Status: WFL       LUE AROM : WFL    RUE PROM:   R Shoulder Flex  0-180: 92 degrees  R Shoulder ABduction 0-180: 100 degrees    RUE AROM :   R Shoulder Flexion 0-180: 69 degrees  R Shoulder ABduction 0-180: 61 degrees  Finger extension: impaired  Finger flexion: [facility]  External Rotation: impaired  Internal Patient/Caregiver Education & Training, Home Management Training  Plan Comment: e-stim    Goals  Short term goals  Time Frame for Short term goals: 6 weeks  Short term goal 1: Pt will improve score on BBT to 25 blocks in one minute to demonstrate improved functional grasp/release of R hand  Short term goal 2: Pt will improve time on NHPT to no more than 2 minutes to demonstrate improved fine motor coordination  Short term goal 3: Pt will improve R hand  strength to 30# to facilitate independence with opening tight jars/containers  Short term goal 4: Pt will improve RUE AROM shoulder flexion (in unsupported sit) to 115 degrees to facilitate independence with reaching for items overhead  Short term goal 5: Pt will improve RUE AROM shoulder abduction (in unsupported sit) to 120 degrees to facilitate independence with recreational activities  Long term goals  Time Frame for Long term goals : 8 weeks  Long term goal 1: Pt will improve score on BBT to 35 blocks in one minute to demonstrate improved functional grasp/release of R hand  Long term goal 2: Pt will improve time on NHPT to no more than 1 minute and 30 seconds to demonstrate improved fine motor coordination  Long term goal 3: Pt will improve R hand  strength to 40# to facilitate independence with opening tight jars/containers  Long term goal 4: Pt will improve RUE AROM shoulder flexion (in unsupported sit) to 125 degrees to facilitate independence with reaching for items overhead  Long term goal 5: Pt will improve RUE AROM shoulder abduction (in unsupported sit) to 130 degrees to facilitate independence with recreational activities  Patient Goals   Patient goals :  \"To use right hand\"      Timed Code Minutes: 30 minutes    Total Treatment Minutes: 40 minutes  10 (mod complex eval), 30 (TA)       Greg Baez, OT

## 2018-08-17 NOTE — PROGRESS NOTES
Outpatient Occupational Therapy  Phone: [phone] Fax: [phone]     To:  Dr. Nicholas Britt      From: Denny Scales    Patient: Elbert Garner      : 1950  Diagnosis: L ischemic stroke affecting R side (M14.758)  Treatment Diagnosis:  R side hemiparesis  MRN: [id number]      Date: 2018     [facility]  Dear Dr. Nicholas Britt  The following patient has been evaluated for occupational therapy services and for therapy to continue, Medicare requires monthly physician review of the treatment plan. Please review the attached evaluation and/or summary of the patient's plan of care, and verify that you agree therapy should continue by signing the attached document and sending it back to our office. Plan of Care/Treatment to date:  [x] Therapeutic Exercise  [x] Modalities:  [x] Therapeutic Activity   [] Ultrasound  [x] Electrical Stimulation   [] Total Motion Release   [] Fluidotherapy [] Kinesiotaping  [x] Neuromuscular Re-education  [] Iontophoresis [] Coldpack/hotpack   [x] Instruction in HEP    Other:  [x] Manual Therapy     []   [] Aquatic Therapy     [] ? Frequency/Duration:  # Days per week: [] 1 day # Weeks: [] 1 week [] 5 weeks      [] 2 days? [] 2 weeks [] 6 weeks     [x] 3 days   [] 3 weeks [] 7 weeks     [] 4 days   [] 4 weeks [x] 8 weeks    Rehab Potential: [] excellent [x] good [] fair  [] poor       Electronically signed by:  Denny Scales          If you have any questions or concerns, please don't hesitate to call.   Thank you for your referral.      Physician Signature:________________________________Date:__________________  By signing above, therapists plan is approved by physician

## 2018-08-17 NOTE — PLAN OF CARE
speech. all questions answered, verbalizing understanding of rec and educated items despite reluctant to use walker. REQUIRES PT FOLLOW UP: Yes  Treatment Initiated : YES    Goals:  Short term goals  Time Frame for Short term goals: 4 weeks  Short term goal 1: Pt to be indep with HEP to max rehab potential  Short term goal 2: Pt to illustrate good to normal sitting balance for ease of transfers and bed mobility skills/ADLS  Short term goal 3: Pt to illustrate improve functional mobility by dec time per TUG from 32 seconds to less than 20 sessions with least restrictive assistive device  Short term goal 4: Pt to dec impairment per TEJADA to less than 40%   Short term goal 5: Pt to dec impairment per Tinetti to less than 40%   Short term goal 6: Pt to dec impairment per DGI to less than 40%   Short term goal 7: Pt to improve functional strength in LE evident by improving sit/ 30 seconds from 6 with use of hands to without UE support/use 30 seconds to complete >/=10reps with good descending control and without relying on the back of stabilized chair  Short term goal 8: Improve involved LE functional strength to >/3+ to4-/5 needed for gait mechanics, ease of heel off/toe off and initial heel strike on the involved LE   Long term goals  Time Frame for Long term goals : 8 weeks  Long term goal 1: Pt to be indep with all functional transfers and mobility all surfaces >/=1000ft to return as safe community ambulation   Long term goal 2: Pt to ascend/descend flight of stairs indep for return to prior level and for community/family outtings/gatherings  Long term goal 3: Pt to dec impairment Per TEJADA Tinetti DGY to 0% impairment   Long term goal 4: pt remain fall free at home during course of therapy, with improved confidence with ADLs/IADLS mobility in home and community to >/=80% ( per ABC scale)    Frequency/Duration:  # Days per week: [] 1 day # Weeks: [] 1 week [] 5 weeks     [x] 2 days?    [] 2 weeks [] 6 weeks     [x] 3 days   [] 3 weeks [] 7 weeks     [] 4 days   [] 4 weeks [x] 8 weeks    Rehab Potential: [] Excellent [x] Good [] Fair  [] Poor     [facility] - requesting written prescription. Electronically signed by:  Tomasz Alfred PT DPT      If you have any questions or concerns, please don't hesitate to call.   Thank you for your referral.      Physician Signature:________________________________Date:__________________  By signing above, therapists plan is approved by physician

## 2018-08-20 ENCOUNTER — HOSPITAL ENCOUNTER (OUTPATIENT)
Dept: SPEECH THERAPY | Age: 68
Setting detail: THERAPIES SERIES
Discharge: HOME | End: 2018-08-20
Payer: MEDICARE

## 2018-08-20 ENCOUNTER — HOSPITAL ENCOUNTER (OUTPATIENT)
Dept: OCCUPATIONAL THERAPY | Age: 68
Setting detail: THERAPIES SERIES
Discharge: HOME | End: 2018-08-20
Payer: MEDICARE

## 2018-08-20 ENCOUNTER — HOSPITAL ENCOUNTER (OUTPATIENT)
Dept: PHYSICAL THERAPY | Age: 68
Setting detail: THERAPIES SERIES
Discharge: HOME | End: 2018-08-20
Payer: MEDICARE

## 2018-08-20 PROCEDURE — 97110 THERAPEUTIC EXERCISES: CPT | Performed by: PHYSICAL THERAPIST

## 2018-08-20 PROCEDURE — 97140 MANUAL THERAPY 1/> REGIONS: CPT

## 2018-08-20 PROCEDURE — 97110 THERAPEUTIC EXERCISES: CPT

## 2018-08-20 PROCEDURE — 97530 THERAPEUTIC ACTIVITIES: CPT | Performed by: PHYSICAL THERAPIST

## 2018-08-20 PROCEDURE — 97116 GAIT TRAINING THERAPY: CPT | Performed by: PHYSICAL THERAPIST

## 2018-08-20 PROCEDURE — 97530 THERAPEUTIC ACTIVITIES: CPT

## 2018-08-20 PROCEDURE — G8999 MOTOR SPEECH CURRENT STATUS: HCPCS

## 2018-08-20 PROCEDURE — G9186 MOTOR SPEECH GOAL STATUS: HCPCS

## 2018-08-20 NOTE — FLOWSHEET NOTE
Supine RUE shoulder abduction AROM    2 sets x5 reps With facilitation for elbow extension and mid delt activation/sustenance  Fatigues quickly     RUE supination stretch (unsupported sit)   3 sets x30 sections  To maximize ROM during AROM exercises   BUE supination/pronation (unsupported sit)   10x Handling to inhibit RUE shoulder elevation, improving to no handling needed with reps               Manual:  STM to upper arm and medial scap for pain management. Tolerated well, pt satisfied    Timed Code Treatment Minutes:   60 minutes    Total Treatment Minutes:  60 minutes  8 (manual), 10 (TA), 42 (TE)    Assessment     Performance deficits / Impairments: Decreased functional mobility ; Decreased ROM; Decreased strength;Decreased fine motor control;Decreased high-level IADLs;Decreased balance;Decreased coordination    Assessment: Pt demo'd significant difficulty with activation/sustenance of targeted muscle groups d/t decrease strength of RUE. Pt demo'd difficulty with multiple degrees of movement (i.e. Shoulder flexion with elbow extension) d/t decrease strength. Pt requires facilitation to correct ineffective movement patterns. Pt requires frequent rest breaks d/t poor activity tolerance of RUE.  Pt cont to benefit from skilled OT, cont per POC    Patient Education: importance of maintaining good alignment during exercises and decreasing use of compensatory movement patterns - verb understanding    Treatment/Activity Tolerance:     [x]  Patient tolerated treatment well []  Patient limited by fatigue    []  Patient limited by pain []  Patient limited by other medical complications   []  Other:     Prognosis: [x]  Good []  Fair  []  Poor    Patient Requires Follow-up:  [x]  Yes  []  No    Plan: [x]  Continue per plan of care []  Alter current plan (see comments)   []  Plan of care initiated []  Hold pending MD visit []  Discharge    Plan for Next Session:   RUE strengthening in supine position to focus on elbow extension during exercises. Use e-stim to assist with exercises as needed for strengthening and relearning normal movement patterns. Work on RUE fine/gross motor coordination and R hand strengthening. Incorporate dynamic standing balance activities with functional activities of RUE    Goals  Short term goals  Time Frame for Short term goals: 6 weeks  Short term goal 1: Pt will improve score on BBT to 25 blocks in one minute to demonstrate improved functional grasp/release of R hand  Short term goal 2: Pt will improve time on NHPT to no more than 2 minutes to demonstrate improved fine motor coordination  Short term goal 3: Pt will improve R hand  strength to 30# to facilitate independence with opening tight jars/containers  Short term goal 4: Pt will improve RUE AROM shoulder flexion (in unsupported sit) to 115 degrees to facilitate independence with reaching for items overhead  Short term goal 5: Pt will improve RUE AROM shoulder abduction (in unsupported sit) to 120 degrees to facilitate independence with recreational activities    Long term goals  Time Frame for Long term goals : 8 weeks  Long term goal 1: Pt will improve score on BBT to 35 blocks in one minute to demonstrate improved functional grasp/release of R hand  Long term goal 2: Pt will improve time on NHPT to no more than 1 minute and 30 seconds to demonstrate improved fine motor coordination  Long term goal 3: Pt will improve R hand  strength to 40# to facilitate independence with opening tight jars/containers  Long term goal 4: Pt will improve RUE AROM shoulder flexion (in unsupported sit) to 125 degrees to facilitate independence with reaching for items overhead  Long term goal 5: Pt will improve RUE AROM shoulder abduction (in unsupported sit) to 130 degrees to facilitate independence with recreational activities    Patient Goals   Patient goals :  \"To use right hand\"    Electronically signed by:  Craig Abad

## 2018-08-20 NOTE — FLOWSHEET NOTE
balance for ease of transfers and bed mobility skills/ADLS  Short term goal 3: Pt to illustrate improve functional mobility by dec time per TUG from 32 seconds to less than 20 sessions with least restrictive assistive device  Short term goal 4: Pt to dec impairment per TEJADA to less than 40%   Short term goal 5: Pt to dec impairment per Tinetti to less than 40%   Short term goal 6: Pt to dec impairment per DGI to less than 40%   Short term goal 7: Pt to improve functional strength in LE evident by improving sit/ 30 seconds from 6 with use of hands to without UE support/use 30 seconds to complete >/=10reps with good descending control and without relying on the back of stabilized chair  Short term goal 8: Improve involved LE functional strength to >/3+ to4-/5 needed for gait mechanics, ease of heel off/toe off and initial heel strike on the involved LE   Long term goals  Time Frame for Long term goals : 8 weeks  Long term goal 1: Pt to be indep with all functional transfers and mobility all surfaces >/=1000ft to return as safe community ambulation   Long term goal 2: Pt to ascend/descend flight of stairs indep for return to prior level and for community/family outtings/gatherings  Long term goal 3: Pt to dec impairment Per TEJADA Tinetti DGY to 0% impairment   Long term goal 4: pt remain fall free at home and in therapy and confidence with ADLs/IADLS mobility in home and community to >/=75%     Plan:   [x] Continue per plan of care [] Alter current plan (see comments)  [x] Plan of care initiated [] Hold pending MD visit [] Discharge    Plan for Next Session: Cont with POC      Electronically signed by:  Shanna Skelton

## 2018-08-21 NOTE — FLOWSHEET NOTE
Speech-Language Pathology  Daily Treatment Note    Date:  2018  Patient Name:  Glenda Villanueva      :  1950    MRN: [id number]  Restrictions/Precautions:    Medical Diagnosis:   I63.9 (ICD-10-CM) - Acute CVA                       Treatment Diagnosis:  Dysarthria (R47.1), Cognitive Communication Deficit (R73.052)   Insurance/Certification information:     Physician Information:  [facility]    Plan of care signed (Y/N):  sent  Visit# / total visits:  2/10  Pain level: denied   G-Code:  SLP G-Codes  Functional Limitations: Motor speech  Motor Speech Current Status (): At least 1 percent but less than 20 percent impaired, limited or restricted  Motor Speech Goal Status (): 0 percent impaired, limited or restricted    Progress Note: []  Yes  [x]  No  Next due by: Visit #10     Subjective:      Objective:   Progress Toward Goals:  1.  Pt will articulate multisyllabic words alone and in sentences with 100% accuracy. Pt is able to articulate multisyllabic words with 90% accuracy. Pt is 100% intelligible at the conversation level. Pt demonstrates high level of sensitivity to perceived inaccuracies in his conversational speech, even when they do no impact intelligibility or listener perception. SLP educated pt re: strategies to facilitate speech intelligibility; pt verbalized comprehension. Continue goal.    2.  Pt will complete word retrieval exercises (including cognitively-complex, word fluency, ... ) independnetly. Goal not directly targeted during this session. Continue goal.    3.  Pt will use compensatory memory strategies independently to recall information, following a short delay, with 95% accuracy. Goal not directly targeted during this session. Continue goal.    4.  Pt will demonstrate insight to situation, independently. Goal not directly targeted during this session.   Continue goal.      Assessment:   Dysarthria  Cognitive Communication Impairment    Plan:   Continue per POC (30 min tx, 3x/week)    Timed Code Treatment: 0 minutes    Total Treatment Time: 30 minutes     Signature:  Jose Miguel Weathers M.A., [address] [facility]  Phone: [phone]  Fax: [phone]

## 2018-08-22 ENCOUNTER — HOSPITAL ENCOUNTER (OUTPATIENT)
Dept: OCCUPATIONAL THERAPY | Age: 68
Setting detail: THERAPIES SERIES
Discharge: HOME | End: 2018-08-22
Payer: MEDICARE

## 2018-08-22 ENCOUNTER — APPOINTMENT (OUTPATIENT)
Dept: OCCUPATIONAL THERAPY | Age: 68
End: 2018-08-22
Payer: MEDICARE

## 2018-08-22 ENCOUNTER — APPOINTMENT (OUTPATIENT)
Dept: PHYSICAL THERAPY | Age: 68
End: 2018-08-22
Payer: MEDICARE

## 2018-08-22 ENCOUNTER — HOSPITAL ENCOUNTER (OUTPATIENT)
Dept: SPEECH THERAPY | Age: 68
Setting detail: THERAPIES SERIES
Discharge: HOME | End: 2018-08-22
Payer: MEDICARE

## 2018-08-22 PROCEDURE — 97110 THERAPEUTIC EXERCISES: CPT

## 2018-08-22 PROCEDURE — 97127 HC SP THER IVNTJ W/FOCUS COG FUNCJ: CPT

## 2018-08-22 PROCEDURE — 92507 TX SP LANG VOICE COMM INDIV: CPT

## 2018-08-22 PROCEDURE — 97112 NEUROMUSCULAR REEDUCATION: CPT

## 2018-08-22 PROCEDURE — 97530 THERAPEUTIC ACTIVITIES: CPT

## 2018-08-22 NOTE — FLOWSHEET NOTE
treatment well []  Patient limited by fatigue    []  Patient limited by pain []  Patient limited by other medical complications   []  Other:     Prognosis: [x]  Good []  Fair  []  Poor    Patient Requires Follow-up:  [x]  Yes  []  No    Plan: [x]  Continue per plan of care []  Alter current plan (see comments)   []  Plan of care initiated []  Hold pending MD visit []  Discharge    Plan for Next Session:  RUE strengthening in supine position to focus on elbow extension during exercises. Use e-stim to assist with exercises as needed for strengthening and relearning normal movement patterns. Work on RUE fine/gross motor coordination and R hand strengthening.  Incorporate dynamic standing balance activities with functional activities of RUE    Goals  Short term goals  Time Frame for Short term goals: 6 weeks  Short term goal 1: Pt will improve score on BBT to 25 blocks in one minute to demonstrate improved functional grasp/release of R hand  Short term goal 2: Pt will improve time on NHPT to no more than 2 minutes to demonstrate improved fine motor coordination  Short term goal 3: Pt will improve R hand  strength to 30# to facilitate independence with opening tight jars/containers  Short term goal 4: Pt will improve RUE AROM shoulder flexion (in unsupported sit) to 115 degrees to facilitate independence with reaching for items overhead  Short term goal 5: Pt will improve RUE AROM shoulder abduction (in unsupported sit) to 120 degrees to facilitate independence with recreational activities    Long term goals  Time Frame for Long term goals : 8 weeks  Long term goal 1: Pt will improve score on BBT to 35 blocks in one minute to demonstrate improved functional grasp/release of R hand  Long term goal 2: Pt will improve time on NHPT to no more than 1 minute and 30 seconds to demonstrate improved fine motor coordination  Long term goal 3: Pt will improve R hand  strength to 40# to facilitate independence with opening

## 2018-08-24 ENCOUNTER — APPOINTMENT (OUTPATIENT)
Dept: PHYSICAL THERAPY | Age: 68
End: 2018-08-24
Payer: MEDICARE

## 2018-08-24 ENCOUNTER — HOSPITAL ENCOUNTER (OUTPATIENT)
Dept: SPEECH THERAPY | Age: 68
Setting detail: THERAPIES SERIES
Discharge: HOME | End: 2018-08-24
Payer: MEDICARE

## 2018-08-24 ENCOUNTER — HOSPITAL ENCOUNTER (OUTPATIENT)
Dept: OCCUPATIONAL THERAPY | Age: 68
Setting detail: THERAPIES SERIES
Discharge: HOME | End: 2018-08-24
Payer: MEDICARE

## 2018-08-24 ENCOUNTER — HOSPITAL ENCOUNTER (OUTPATIENT)
Dept: PHYSICAL THERAPY | Age: 68
Setting detail: THERAPIES SERIES
Discharge: HOME | End: 2018-08-24
Payer: MEDICARE

## 2018-08-24 PROCEDURE — 97127 HC SP THER IVNTJ W/FOCUS COG FUNCJ: CPT

## 2018-08-24 PROCEDURE — 97110 THERAPEUTIC EXERCISES: CPT

## 2018-08-24 PROCEDURE — 92507 TX SP LANG VOICE COMM INDIV: CPT

## 2018-08-24 PROCEDURE — 97530 THERAPEUTIC ACTIVITIES: CPT

## 2018-08-24 PROCEDURE — 97112 NEUROMUSCULAR REEDUCATION: CPT

## 2018-08-24 PROCEDURE — 97110 THERAPEUTIC EXERCISES: CPT | Performed by: PHYSICAL THERAPIST

## 2018-08-24 NOTE — FLOWSHEET NOTE
Occupational Therapy Daily Treatment Note    Date:  2018    Patient Name:  Deisy Beck     :  1950  MRN: [id number]  Restrictions/Precautions: none. Has AFO for RLE   Medical/Treatment Diagnosis Information:  · L ischemic stroke affecting R side (L hand dominant)   · R side hemiparesis   Insurance/Certification information: Medicare    Physician Information: Dr. Hieu Cervantes of care signed (Y/N):  Y  Visit# / total visits:   Pain level: 0/10     OT G-codes ()  Functional Assessment Tool Used: Box and Blocks  Score: 15  Functional Limitation: Carrying, moving and handling objects  Carrying, Moving and Handling Objects Current Status (): At least 80 percent but less than 100 percent impaired, limited or restricted  Carrying, Moving and Handling Objects Goal Status (): At least 60 percent but less than 80 percent impaired, limited or restricted     Functional Assessment Tool Used: Tiffany Garcia  Score: 26  Functional Limitation: Self care  Self Care Current Status (): At least 20 percent but less than 40 percent impaired, limited or restricted  Self Care Goal Status (): At least 1 percent but less than 20 percent impaired, limited or restricted    Progress Note: []  Yes  [x]  No  Next due by: Visit #10      Subjective: Pt reported dizziness continues to improve    Therapeutic Activities:      Activity #1: Distant supervision for functional mobility to/from bathroom d/t unsteadiness with turns and to ensure patient safety. Completed with SBA    Activity #2: Pt in stance to fold blanket to work on bilateral task practice and functional grasp/release of R hand. With sit->stand to complete task, pt with anterior LOB requiring min A to correct. Pt reported mistakenly stood when he was still dizzy (just sat up from supine prior to standing).  Pt required SBA-CGA for dynamic standing balance without BUE support to fold blanket requiring increase time to complete demonstrating Education: importance of maintaining good alignment during exercises and decreasing use of compensatory movement patterns - verb understanding    Treatment/Activity Tolerance:     [x]  Patient tolerated treatment well []  Patient limited by fatigue    []  Patient limited by pain []  Patient limited by other medical complications   []  Other:     Prognosis: [x]  Good []  Fair  []  Poor    Patient Requires Follow-up:  [x]  Yes  []  No    Plan: [x]  Continue per plan of care []  Alter current plan (see comments)   []  Plan of care initiated []  Hold pending MD visit []  Discharge    Plan for Next Session:  RUE strengthening in supine position to focus on elbow extension during exercises. Use e-stim to assist with exercises as needed for strengthening and relearning normal movement patterns. Work on RUE fine/gross motor coordination and R hand strengthening.  Incorporate dynamic standing balance activities with functional activities of RUE    Goals  Short term goals  Time Frame for Short term goals: 6 weeks  Short term goal 1: Pt will improve score on BBT to 25 blocks in one minute to demonstrate improved functional grasp/release of R hand  Short term goal 2: Pt will improve time on NHPT to no more than 2 minutes to demonstrate improved fine motor coordination  Short term goal 3: Pt will improve R hand  strength to 30# to facilitate independence with opening tight jars/containers  Short term goal 4: Pt will improve RUE AROM shoulder flexion (in unsupported sit) to 115 degrees to facilitate independence with reaching for items overhead  Short term goal 5: Pt will improve RUE AROM shoulder abduction (in unsupported sit) to 120 degrees to facilitate independence with recreational activities    Long term goals  Time Frame for Long term goals : 8 weeks  Long term goal 1: Pt will improve score on BBT to 35 blocks in one minute to demonstrate improved functional grasp/release of R hand  Long term goal 2: Pt will improve time on NHPT to no more than 1 minute and 30 seconds to demonstrate improved fine motor coordination  Long term goal 3: Pt will improve R hand  strength to 40# to facilitate independence with opening tight jars/containers  Long term goal 4: Pt will improve RUE AROM shoulder flexion (in unsupported sit) to 125 degrees to facilitate independence with reaching for items overhead  Long term goal 5: Pt will improve RUE AROM shoulder abduction (in unsupported sit) to 130 degrees to facilitate independence with recreational activities    Patient Goals   Patient goals :  \"To use right hand\"    Electronically signed by:  Eliud Sahu

## 2018-08-24 NOTE — FLOWSHEET NOTE
restrictive assistive device  Short term goal 4: Pt to dec impairment per TEJADA to less than 40%   Short term goal 5: Pt to dec impairment per Tinetti to less than 40%   Short term goal 6: Pt to dec impairment per DGI to less than 40%   Short term goal 7: Pt to improve functional strength in LE evident by improving sit/ 30 seconds from 6 with use of hands to without UE support/use 30 seconds to complete >/=10reps with good descending control and without relying on the back of stabilized chair  Short term goal 8: Improve involved LE functional strength to >/3+ to4-/5 needed for gait mechanics, ease of heel off/toe off and initial heel strike on the involved LE   Long term goals  Time Frame for Long term goals : 8 weeks  Long term goal 1: Pt to be indep with all functional transfers and mobility all surfaces >/=1000ft to return as safe community ambulation   Long term goal 2: Pt to ascend/descend flight of stairs indep for return to prior level and for community/family outtings/gatherings  Long term goal 3: Pt to dec impairment Per TEJADA Tinetti DGY to 0% impairment   Long term goal 4: pt remain fall free at home and in therapy and confidence with ADLs/IADLS mobility in home and community to >/=75%     Plan:   [x] Continue per plan of care [] Alter current plan (see comments)  [x] Plan of care initiated [] Hold pending MD visit [] Discharge    Plan for Next Session: Cont with POC      Electronically signed by:  Campos Lobo

## 2018-08-24 NOTE — FLOWSHEET NOTE
Speech-Language Pathology  Daily Treatment Note    Date:  18  Patient Name:  Julien Baldwin      :  1950    MRN: [id number]  Restrictions/Precautions:    Medical Diagnosis:   I63.9 (ICD-10-CM) - Acute CVA                       Treatment Diagnosis:  Dysarthria (R47.1), Cognitive Communication Deficit (X68.036)   Insurance/Certification information:     Physician Information:  Creighton Eisenmenger of care signed (Y/N):  Y  Visit# / total visits:  4/10  Pain level: denied   G-Code:       Progress Note: []  Yes  [x]  No  Next due by: Visit #10     Subjective:  Pt reports feeling uncomfortable/judged for using a cane and \"sounding like I have Lonni Lofts Syndrome\" in attempts to return to community socialization (specifically at a wine tasting dinner he attended last evening). Objective:   Progress Toward Goals:  1.  Pt will articulate multisyllabic words alone and in sentences with 100% accuracy. Pt is able to orally-read a short paragraph with 98% intelligibility  (and ~96% accuracy); pt self identified 2/3 errors in-line and 1/3 when listenting to playback of his recorded speech. Continue goal.    2.  Pt will complete word retrieval exercises (including cognitively-complex, word fluency, ... ) independnetly. Goal not directly targeted during this session. Continue goal.    3.  Pt will use compensatory memory strategies independently to recall information, following a short delay, with 95% accuracy. Following a short delay, pt was able to respond to comprehension questions related to a written paragraph, with 100% accuracy. Continue goal.    4.  Pt will demonstrate insight to situation, independently. SLP educated pt re: normal variation in speech (ie: regional differences) and co-articulation; pt verbalized comprehension.   Continue goal.      Assessment:   Dysarthria  Cognitive Communication Impairment    Plan:   Continue per POC (30 min tx, 3x/week)    Timed Code Treatment: 10 minutes    Total Treatment Time: 25 minutes     Signature:  Renae Padilla M.A., [address] [facility]  Phone: [phone]  Fax: [phone]

## 2018-08-27 ENCOUNTER — HOSPITAL ENCOUNTER (OUTPATIENT)
Dept: PHYSICAL THERAPY | Age: 68
Setting detail: THERAPIES SERIES
Discharge: HOME | End: 2018-08-27
Payer: MEDICARE

## 2018-08-27 ENCOUNTER — APPOINTMENT (OUTPATIENT)
Dept: PHYSICAL THERAPY | Age: 68
End: 2018-08-27
Payer: MEDICARE

## 2018-08-27 ENCOUNTER — HOSPITAL ENCOUNTER (OUTPATIENT)
Dept: OCCUPATIONAL THERAPY | Age: 68
Setting detail: THERAPIES SERIES
Discharge: HOME | End: 2018-08-27
Payer: MEDICARE

## 2018-08-27 ENCOUNTER — HOSPITAL ENCOUNTER (OUTPATIENT)
Dept: SPEECH THERAPY | Age: 68
Setting detail: THERAPIES SERIES
Discharge: HOME | End: 2018-08-27
Payer: MEDICARE

## 2018-08-27 PROCEDURE — 92507 TX SP LANG VOICE COMM INDIV: CPT

## 2018-08-27 PROCEDURE — 97110 THERAPEUTIC EXERCISES: CPT

## 2018-08-27 PROCEDURE — 97112 NEUROMUSCULAR REEDUCATION: CPT

## 2018-08-27 PROCEDURE — 97127 HC SP THER IVNTJ W/FOCUS COG FUNCJ: CPT

## 2018-08-27 PROCEDURE — 97110 THERAPEUTIC EXERCISES: CPT | Performed by: PHYSICAL THERAPIST

## 2018-08-27 PROCEDURE — 97530 THERAPEUTIC ACTIVITIES: CPT | Performed by: PHYSICAL THERAPIST

## 2018-08-27 NOTE — FLOWSHEET NOTE
of care [] Alter current plan (see comments)  [x] Plan of care initiated [] Hold pending MD visit [] Discharge    Plan for Next Session: Cont with POC      Electronically signed by:  Rafiq Patel

## 2018-08-27 NOTE — FLOWSHEET NOTE
Fair  []  Poor    Patient Requires Follow-up:  [x]  Yes  []  No    Plan: [x]  Continue per plan of care []  Alter current plan (see comments)   []  Plan of care initiated []  Hold pending MD visit []  Discharge    Plan for Next Session:  RUE strengthening in supine position to focus on elbow extension during exercises. Use e-stim to assist with exercises as needed for strengthening and relearning normal movement patterns. Work on RUE fine/gross motor coordination and R hand strengthening.  Incorporate dynamic standing balance activities with functional activities of RUE    Goals  Short term goals  Time Frame for Short term goals: 6 weeks  Short term goal 1: Pt will improve score on BBT to 25 blocks in one minute to demonstrate improved functional grasp/release of R hand  Short term goal 2: Pt will improve time on NHPT to no more than 2 minutes to demonstrate improved fine motor coordination  Short term goal 3: Pt will improve R hand  strength to 30# to facilitate independence with opening tight jars/containers  Short term goal 4: Pt will improve RUE AROM shoulder flexion (in unsupported sit) to 115 degrees to facilitate independence with reaching for items overhead  Short term goal 5: Pt will improve RUE AROM shoulder abduction (in unsupported sit) to 120 degrees to facilitate independence with recreational activities    Long term goals  Time Frame for Long term goals : 8 weeks  Long term goal 1: Pt will improve score on BBT to 35 blocks in one minute to demonstrate improved functional grasp/release of R hand  Long term goal 2: Pt will improve time on NHPT to no more than 1 minute and 30 seconds to demonstrate improved fine motor coordination  Long term goal 3: Pt will improve R hand  strength to 40# to facilitate independence with opening tight jars/containers  Long term goal 4: Pt will improve RUE AROM shoulder flexion (in unsupported sit) to 125 degrees to facilitate independence with reaching for items

## 2018-08-29 ENCOUNTER — APPOINTMENT (OUTPATIENT)
Dept: PHYSICAL THERAPY | Age: 68
End: 2018-08-29
Payer: MEDICARE

## 2018-08-29 ENCOUNTER — HOSPITAL ENCOUNTER (OUTPATIENT)
Dept: OCCUPATIONAL THERAPY | Age: 68
Setting detail: THERAPIES SERIES
Discharge: HOME | End: 2018-08-29
Payer: MEDICARE

## 2018-08-29 ENCOUNTER — HOSPITAL ENCOUNTER (OUTPATIENT)
Dept: SPEECH THERAPY | Age: 68
Setting detail: THERAPIES SERIES
Discharge: HOME | End: 2018-08-29
Payer: MEDICARE

## 2018-08-29 ENCOUNTER — HOSPITAL ENCOUNTER (OUTPATIENT)
Dept: PHYSICAL THERAPY | Age: 68
Setting detail: THERAPIES SERIES
Discharge: HOME | End: 2018-08-29
Payer: MEDICARE

## 2018-08-29 PROCEDURE — 97112 NEUROMUSCULAR REEDUCATION: CPT | Performed by: PHYSICAL THERAPIST

## 2018-08-29 PROCEDURE — 97530 THERAPEUTIC ACTIVITIES: CPT | Performed by: PHYSICAL THERAPIST

## 2018-08-29 PROCEDURE — 97116 GAIT TRAINING THERAPY: CPT | Performed by: PHYSICAL THERAPIST

## 2018-08-29 PROCEDURE — 92507 TX SP LANG VOICE COMM INDIV: CPT

## 2018-08-29 PROCEDURE — 97110 THERAPEUTIC EXERCISES: CPT | Performed by: PHYSICAL THERAPIST

## 2018-08-29 PROCEDURE — 97112 NEUROMUSCULAR REEDUCATION: CPT

## 2018-08-29 PROCEDURE — 97110 THERAPEUTIC EXERCISES: CPT

## 2018-08-29 NOTE — FLOWSHEET NOTE
x15 reps    x10 reps Bilateral task practice for both sets. First set, required verbal/tactile cues for effective movement patterns    E-stim assist for second set d/t decrease strength RUE triceps     Supine RUE horizontal ab/adduction   x15 reps    x10 reps Bilateral task practice for both sets. First set, required verbal/tactile cues for effective movement patterns    E-stim assist for second set d/t decrease strength RUE triceps     Unsupported sit R hand finger ab/adduction   2 sets x10 reps With stabilization at wrist to improve movement patterns. Unsupported sit R hand MP flexion/extension   10 reps Assist to block PIP flexion during MP flexion   Unsupported sit RUE supination stretch   2 sets x 1 minute hold In prep for AROM supination/pronation   Unsupported sit RUE supination/pronation AROM 10 reps Completed bilaterally for NMR and strengthening     Modalities:  RUE Elbow Extensors: VMS burst 50 bps. 10/10 cycle with . 5 second ramp. Amplitude 30 mA CC x12 minutes    Timed Code Treatment Minutes:   55 minutes    Total Treatment Minutes:  55 minutes  12 (NMR), 43 (TE)    Assessment     Performance deficits / Impairments: Decreased functional mobility ; Decreased ROM; Decreased strength;Decreased fine motor control;Decreased high-level IADLs;Decreased balance;Decreased coordination    Assessment: Pt demo'd increase weakness in RUE triceps this date requiring e-stim assist for 2nd set of supine RUE shoulder exercises. Pt progressing well with unsupported sit exercises cont to demo slight tightness in pronators achieving full AROM following stretches versus prior to stretching. Pt cont to demo decrease functional use of RUE s/p CVA which impacts ability to complete every day activities.  Pt cont to benefit from skilled OT, cont per POC    Patient Education: importance of maintaining good alignment during exercises and decreasing use of compensatory movement patterns - verb understanding    Treatment/Activity

## 2018-08-29 NOTE — FLOWSHEET NOTE
[facility]  Physical Therapy Daily Treatment Note  Date:  2018- documentation date, treatment and evaluation date 18    Patient Name:  Veronika Sanders    :  1950  MRN: [id number]  Restrictions/Precautions:    Medical/Treatment Diagnosis Information:  · Diagnosis: CVA I63.40  · Treatment Diagnosis: balance problems 2/2 CVA  Insurance/Certification information:  PT Insurance Information: Medicare  Physician Information:  Referring Practitioner: Dr. Neeraj Newberry MD Follow-up: Not known  Plan of care signed (Y/N):  Y  Visit# / total visits:    Pain level: No pain reported on this date  G-Code noted on 2018:   PT G-Codes  Functional Assessment Tool Used: Tinetti  Score:   Functional Limitation: Mobility: Walking and moving around  Mobility: Walking and Moving Around Current Status (): At least 80 percent but less than 100 percent impaired, limited or restricted  Mobility: Walking and Moving Around Goal Status (): 0 percent impaired, limited or restricted    Progress Note: []  Yes  [x]  No  Next due by: Visit #10      Subjective:  Pt reports that he fell yesterday while his wife was not home. Pt reports that he was able to get himself back up on the couch. No injuries reported  Objective:  Observation: Pt sitting in the gym Exercises:  Exercise/Equipment Resistance/Repetitions Other comments                                                                           Other Therapeutic Activities:   Home Exercise Program: PT  Cont to review the HEP with pt to be sure that he was performing to obtain max benefit. PT had pt remove the AFO so that he was able to optimize the ankle strategies. The HEP includes:    1. 1/2 bridge without use of the AFO. The LLE is in figure \"4\" on the R LE   2. PNF pattern with R  LE off of the table and crossing over the opposing LE(focus on leading with the toes    3.  Prone position: alternating hamstring curls with focus on DF when performing   Pt demo 5 reps of each ex to  demo a clear understanding of the ex( Pt is now yulisa 1/2 range with this ex)    In a sidelying position: PT instructed pt with ;  1. \"clamshells\"  2. Hip abd with knee ext   3. Hip adduction with opposite foot supported on walker to facilitate adduction against gravity  Performed to BLES x 10 reps each    PT instructed pt with standing on the [employer] ball. Pt req mod A to stand on the ball and was too unsteady to cont this activity. PT instructed pt with placing R foot up on mat table while the other LE remained in full stance position. Pt instructed to do partial squats as both a strengthening ex and a way to activate increased ankle strategies. This same ex was then performed with the L foot up on the mat and partial squats performed. PT instructed pt with resistive walking in both a forwards and backwards direction via \"pulling\" a person who is positioned on a rolling stool with focus on taking longer steps. PT also instructed pt with walking w/o the AFO intact taking longer steps to facilitate increased DF especially on the R foot. No AD used. CGA req Faciliatation techniques used with LUE to faciliatate walking pattern. Timed Code Treatment Minutes: 60     Total Treatment Minutes:  60    Treatment/Activity Tolerance:  [x] Patient tolerated treatment well [] Patient limited by fatigue  [] Patient limited by pain  [] Patient limited by other medical complications  [] Other:     Assessment:Pt worked w/o use of the AFO on this date. Pt reports that he is feeling more confident with walking only using the RW when in the community and  [facility] in his home.        Prognosis: [x] Good [] Fair  [] Poor    Patient Requires Follow-up: [x] Yes  [] No    Goals:  Short term goals  Time Frame for Short term goals: 4 weeks  Short term goal 1: Pt to be indep with HEP to max rehab potential  Short term goal 2: Pt to illustrate good to normal sitting balance for ease of transfers and bed mobility

## 2018-08-31 ENCOUNTER — HOSPITAL ENCOUNTER (OUTPATIENT)
Dept: SPEECH THERAPY | Age: 68
Setting detail: THERAPIES SERIES
Discharge: HOME | End: 2018-08-31
Payer: MEDICARE

## 2018-08-31 ENCOUNTER — HOSPITAL ENCOUNTER (OUTPATIENT)
Dept: PHYSICAL THERAPY | Age: 68
Setting detail: THERAPIES SERIES
Discharge: HOME | End: 2018-08-31
Payer: MEDICARE

## 2018-08-31 ENCOUNTER — HOSPITAL ENCOUNTER (OUTPATIENT)
Dept: OCCUPATIONAL THERAPY | Age: 68
Setting detail: THERAPIES SERIES
Discharge: HOME | End: 2018-08-31
Payer: MEDICARE

## 2018-08-31 ENCOUNTER — APPOINTMENT (OUTPATIENT)
Dept: PHYSICAL THERAPY | Age: 68
End: 2018-08-31
Payer: MEDICARE

## 2018-08-31 PROCEDURE — 92507 TX SP LANG VOICE COMM INDIV: CPT

## 2018-08-31 PROCEDURE — 97530 THERAPEUTIC ACTIVITIES: CPT | Performed by: PHYSICAL THERAPIST

## 2018-08-31 PROCEDURE — 97110 THERAPEUTIC EXERCISES: CPT

## 2018-08-31 PROCEDURE — 97530 THERAPEUTIC ACTIVITIES: CPT

## 2018-08-31 PROCEDURE — 97110 THERAPEUTIC EXERCISES: CPT | Performed by: PHYSICAL THERAPIST

## 2018-08-31 PROCEDURE — 97127 HC SP THER IVNTJ W/FOCUS COG FUNCJ: CPT

## 2018-08-31 PROCEDURE — 97116 GAIT TRAINING THERAPY: CPT | Performed by: PHYSICAL THERAPIST

## 2018-08-31 NOTE — FLOWSHEET NOTE
[facility]  Physical Therapy Daily Treatment Note  Date:  2018- documentation date, treatment and evaluation date 18    Patient Name:  Morteza Kuhn    :  1950  MRN: [id number]  Restrictions/Precautions:    Medical/Treatment Diagnosis Information:  · Diagnosis: CVA I63.40  · Treatment Diagnosis: balance problems 2/2 CVA  Insurance/Certification information:  PT Insurance Information: Medicare  Physician Information:  Referring Practitioner: Dr. Mary Bustillo MD Follow-up: Not known  Plan of care signed (Y/N):  Y  Visit# / total visits:    Pain level: No pain reported on this date  G-Code noted on 2018:   PT G-Codes  Functional Assessment Tool Used: Tinetti  Score:   Functional Limitation: Mobility: Walking and moving around  Mobility: Walking and Moving Around Current Status (): At least 80 percent but less than 100 percent impaired, limited or restricted  Mobility: Walking and Moving Around Goal Status (): 0 percent impaired, limited or restricted    Progress Note: []  Yes  [x]  No  Next due by: Visit #10      Subjective:  Pt reports that he struggles with being here so early this am because he can't sleep the night before. Pt reports that he stays awake thinking that he is going to miss the session. For that reason, pt reports that he is feeling tired. Objective:  Observation: Pt sitting in the gym Exercises:  Exercise/Equipment Resistance/Repetitions Other comments                                                                           Other Therapeutic Activities:   Home Exercise Program: PT  Cont to review the HEP with pt to be sure that he was performing to obtain max benefit. PT had pt remove the AFO so that he was able to optimize the ankle strategies. The HEP includes:    1. 1/2 bridge without use of the AFO. The LLE is in figure \"4\" on the R LE   2.  PNF pattern with R  LE off of the table and crossing over the opposing LE(focus on leading with the toes 60    Treatment/Activity Tolerance:  [x] Patient tolerated treatment well [] Patient limited by fatigue  [] Patient limited by pain  [] Patient limited by other medical complications  [] Other:     Assessment :Pt started treatment with min difficulty but a change in overall status appeared to improve as treatment progressed. Pt appeared pleased with his walking outside w.o use of the AFO. PT collaborated with the PT who will be covering pt next week with regards to [address] so that continuity of care is reinforced.      Prognosis: [x] Good [] Fair  [] Poor    Patient Requires Follow-up: [x] Yes  [] No    Goals:  Short term goals  Time Frame for Short term goals: 4 weeks  Short term goal 1: Pt to be indep with HEP to max rehab potential  Short term goal 2: Pt to illustrate good to normal sitting balance for ease of transfers and bed mobility skills/ADLS  Short term goal 3: Pt to illustrate improve functional mobility by dec time per TUG from 32 seconds to less than 20 sessions with least restrictive assistive device  Short term goal 4: Pt to dec impairment per TEJADA to less than 40%   Short term goal 5: Pt to dec impairment per Tinetti to less than 40%   Short term goal 6: Pt to dec impairment per DGI to less than 40%   Short term goal 7: Pt to improve functional strength in LE evident by improving sit/ 30 seconds from 6 with use of hands to without UE support/use 30 seconds to complete >/=10reps with good descending control and without relying on the back of stabilized chair  Short term goal 8: Improve involved LE functional strength to >/3+ to4-/5 needed for gait mechanics, ease of heel off/toe off and initial heel strike on the involved LE   Long term goals  Time Frame for Long term goals : 8 weeks  Long term goal 1: Pt to be indep with all functional transfers and mobility all surfaces >/=1000ft to return as safe community ambulation   Long term goal 2: Pt to ascend/descend flight of stairs indep for return to prior level and for community/family outtings/gatherings  Long term goal 3: Pt to dec impairment Per TEJADA Tinetti DGY to 0% impairment   Long term goal 4: pt remain fall free at home and in therapy and confidence with ADLs/IADLS mobility in home and community to >/=75%     Plan:   [x] Continue per plan of care [] Alter current plan (see comments)  [x] Plan of care initiated [] Hold pending MD visit [] Discharge    Plan for Next Session: Cont with POC      Electronically signed by:  Dannielle Asencio

## 2018-08-31 NOTE — FLOWSHEET NOTE
Speech-Language Pathology  Daily Treatment Note    Date:  18  Patient Name:  Leandro Ricardo      :  1950    MRN: [id number]  Restrictions/Precautions:    Medical Diagnosis:   I63.9 (ICD-10-CM) - Acute CVA                       Treatment Diagnosis:  Dysarthria (R47.1), Cognitive Communication Deficit (P74.682)   Insurance/Certification information:     Physician Information:  [facility]    Plan of care signed (Y/N):  Y  Visit# / total visits:  5/10  Pain level: denied   G-Code: Motor Speech Current Status (): At least 1 percent but less than 20 percent impaired, limited or restricted  Motor Speech Goal Status (): 0 percent impaired, limited or restricted    Progress Note: []  Yes  [x]  No  Next due by: Visit #10     Subjective:  Pt states that he is asking himself Martin Humphreys did this happen to me? \"       Objective:   Progress Toward Goals:  1.  Pt will articulate multisyllabic words alone and in sentences with 100% accuracy. Pt was able to orally-read a longer non-fiction passage with 100% intelligibility (and ~96% accuracy); pt self-identified pre-reading as effective strategy. Continue goal.    2.  Pt will complete word retrieval exercises (including cognitively-complex, word fluency, ... ) independently. Goal not directly targeted during this session. Continue goal.    3.  Pt will use compensatory memory strategies independently to recall information, following a short delay, with 95% accuracy. Pt was able to recall details from a multi-paragraph written passage, following a short delay, with 90% accuracy. Continue goal.    4.  Pt will demonstrate insight to situation, independently. Pt states that he is asking himself Martin Humphreys did this happen to me? \"   Pt is frustrated by problems (for example he is disappointed that, because he is unable to drive will require his wife to manage taking 2 small children into  by herself) but requires assistance to identify alternative solutions (for example: riding with his wife and staying in the car with the younger grandchild).   Continue goal.    Assessment:   Dysarthria  Cognitive Communication Impairment    Plan:   Continue per POC (30 min tx, 3x/week)    Timed Code Treatment: 15 minutes    Total Treatment Time: 30 minutes     Signature:  Samantha Swain M.A., [address] [facility]  Phone: [phone]  Fax: [phone]

## 2018-08-31 NOTE — FLOWSHEET NOTE
minutes     Signature:  Catrachita Badillo M.A., [address] [facility]  Speech-Language Pathologist  [address] [facility]  Phone: [phone]  Fax: [phone]

## 2018-09-05 ENCOUNTER — APPOINTMENT (OUTPATIENT)
Dept: PHYSICAL THERAPY | Age: 68
End: 2018-09-05
Payer: MEDICARE

## 2018-09-05 ENCOUNTER — HOSPITAL ENCOUNTER (OUTPATIENT)
Dept: SPEECH THERAPY | Age: 68
Setting detail: THERAPIES SERIES
Discharge: HOME | End: 2018-09-05
Payer: MEDICARE

## 2018-09-05 ENCOUNTER — APPOINTMENT (OUTPATIENT)
Dept: OCCUPATIONAL THERAPY | Age: 68
End: 2018-09-05
Payer: MEDICARE

## 2018-09-05 ENCOUNTER — HOSPITAL ENCOUNTER (OUTPATIENT)
Dept: OCCUPATIONAL THERAPY | Age: 68
Setting detail: THERAPIES SERIES
Discharge: HOME | End: 2018-09-05
Payer: MEDICARE

## 2018-09-05 ENCOUNTER — HOSPITAL ENCOUNTER (OUTPATIENT)
Dept: PHYSICAL THERAPY | Age: 68
Setting detail: THERAPIES SERIES
Discharge: HOME | End: 2018-09-05
Payer: MEDICARE

## 2018-09-05 PROCEDURE — 97110 THERAPEUTIC EXERCISES: CPT

## 2018-09-05 PROCEDURE — 97116 GAIT TRAINING THERAPY: CPT

## 2018-09-05 PROCEDURE — 92507 TX SP LANG VOICE COMM INDIV: CPT

## 2018-09-05 PROCEDURE — 97530 THERAPEUTIC ACTIVITIES: CPT

## 2018-09-05 NOTE — FLOWSHEET NOTE
Occupational Therapy Daily Treatment Note    Date:  2018    Patient Name:  Carine De La Cruz     :  1950  MRN: [id number]  Restrictions/Precautions: none. Has AFO for RLE   Medical/Treatment Diagnosis Information:  · L ischemic stroke affecting R side (L hand dominant)   · R side hemiparesis   Insurance/Certification information: Medicare    Physician Information: Dr. Sheryle Rides of care signed (Y/N):  Y  Visit# / total visits:   Pain level: 0/10     OT G-codes ()  Functional Assessment Tool Used: Box and Blocks  Score: 15  Functional Limitation: Carrying, moving and handling objects  Carrying, Moving and Handling Objects Current Status (): At least 80 percent but less than 100 percent impaired, limited or restricted  Carrying, Moving and Handling Objects Goal Status (): At least 60 percent but less than 80 percent impaired, limited or restricted     Functional Assessment Tool Used: Gina Crockett  Score: 26  Functional Limitation: Self care  Self Care Current Status (): At least 20 percent but less than 40 percent impaired, limited or restricted  Self Care Goal Status (): At least 1 percent but less than 20 percent impaired, limited or restricted    Progress Note: []  Yes  [x]  No  Next due by: Visit #10      Subjective: pt denied new issues/complaints    Therapeutic Activities:    Activity #1: unsupported sit to roll tennis ball R hand<->L hand to work on R hand opening and coordination. Progressed activity to increase speed and then progressed to patient to \"catch\" tennis ball with overhand grasp (R hand). Pt required VCs to inhibit R shoulder hiking. Activity #2: Underhand dealing of deck of cards with R hand to work on finger dexterity of digits 3-5.  Pt required increase time to deal 10 cards     Therapeutic Exercise:   Exercise/Equipment  Resistance/Repetitions   Other comments   Supine RUE shoulder flexion AROM   2 sets x15 reps VCs for elbow extension     Supine

## 2018-09-05 NOTE — FLOWSHEET NOTE
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 Barb Bray MD Follow-up: Not known  Plan of care signed (Y/N):  Y  Visit# / total visits:    Pain level: Mild right knee pain and ankle pain    G-Code noted on 2018:   PT G-Codes  Functional Assessment Tool Used: Tinetti  Score:   Functional Limitation: Mobility: Walking and moving around  Mobility: Walking and Moving Around Current Status (): At least 80 percent but less than 100 percent impaired, limited or restricted  Mobility: Walking and Moving Around Goal Status (): 0 percent impaired, limited or restricted    Progress Note: []  Yes  [x]  No  Next due by: Visit #10      Subjective:  \"I only using the cane now and I am feeling more comfortable with it. I'm not dragging my foot all the time anymore either. \" \" I still have a lot of weakness in my right leg which concerns me\" Pt satisfied with care and progress, but eager to return to indep walking. Objective:  Observation: supervision given by family support and drives pt to therapy, comes to therapy with SPC. Pt requires SBA to supervision with SPC today. Pt able to don/off AFO, socks and shoes. Upon observation of the right foot pt with mild swelling non pitting right foot, no observable redness or concerns given AFO wear. Pt x2 tripped on foot but improved since initially evaluation. 2+ to 3-/4 DF PF big toe ext and flexion 3-/5 hamstring strength. Reports can not feel coldness of floor on right foot, hx of neuropathy.      Exercises:  Exercise/Equipment Resistance/Repetitions Other comments   Bridges modified     10-15 reps x2 sets  Figure 4  RLE strengthening   RLE strengthening   PNF  15-20 reps  PNF pattern with R  LE off of the table and crossing over the opposing LE- focus on leading with the toes   Educated may look at toe for inc visual input  Improve toe movement with this than without looking   Prone hamstring alt curl  BLE 15 reps  20 degrees actively knee flexion prior to assist   Prone hamstring curl RLE only in range not from 20-90 pt placed in position further than actively achieved to strength in later range  10 reps CGA to SBA improved with reps and tactical verbal cues  Rec to have wife assist with this at home    Toe curls With towel  20 reps x2 sets  HEP added 9/5/18   Seated PF 7lbs  20 reps x2 sets  Without weight  In HEP added 9/5/18    Sit to stand from 21 inch height mat, on airex X 10 reps  With the RLE behind for strengthening   Mod use of UE, max cues on body mechanics and awareness      Static standing head gazes with narrow base of support on airex  10 reps x 2 sets  x2 LOB   Mod assist of PT to recovery and assist pt to mat  For strengthening and balance    Static standing modified tandem UE reaching   x10 reps  Mod inc sway    Static standing eyes closed narrow base of support  Several attempts unable hold longer than 5 seconds without falling    Alt BLE forward lunge with mini squat on BOSU 10 reps  1 UE needed for balance and to improve form  Pt completes without cues without 50% of time but knee does not buckle but more cues for form needed.    Gait: indep SBA  50 ft x 3 Compensates by hip circumduction, at times foot slapped noted  Hx of neuropathy as well   Gait: SPC  >500ft level surfaces  Dual tasks activities and dynamic balance activities with  Stop go fast slow, directional changes, lateral and vertical head gazes   conversating  Stops to have conservation vs continuous walking, mild hesitancy note with initiating gait noted

## 2018-09-05 NOTE — FLOWSHEET NOTE
Speech-Language Pathology  Daily Treatment Note    Date:  18  Patient Name:  Beth Sánchez      :  1950    MRN: [id number]  Restrictions/Precautions:    Medical Diagnosis:   I63.9 (ICD-10-CM) - Acute CVA                       Treatment Diagnosis:  Dysarthria (R47.1), Cognitive Communication Deficit (M32.071)   Insurance/Certification information:     Physician Information:  [facility]    Plan of care signed (Y/N):  Y  Visit# / total visits:  7/10  Pain level: denied   G-Code: Motor Speech Current Status (): At least 1 percent but less than 20 percent impaired, limited or restricted  Motor Speech Goal Status (): 0 percent impaired, limited or restricted    Progress Note: []  Yes  [x]  No  Next due by: Visit #10     Subjective:  Cooperative, agreeable. Objective:   Progress Toward Goals:  1.  Pt will articulate multisyllabic words alone and in sentences with 100% accuracy. Goal Met. Pt is 100% intelligible to this listener in multisyllabic words, sentences, oral paragraph-reading, and conversation. Pt implements appropriate compensatory strategies independently. SLP educated pt re: improved self-implementation of appropriate strategies and plan to discontinue goal; pt verbalized comprehension. Discontinue goal.      2.  Pt will complete word retrieval exercises (including cognitively-complex, word fluency, ... ) independently. Pt was able to generate synonyms with 90% accuracy with increased time. Continue goal.    3.  Pt will use compensatory memory strategies independently to recall information, following a short delay, with 95% accuracy. Goal not directly targeted during this session. Continue goal.    4.  Pt will demonstrate insight to situation, independently. Pt remains hypercritical of his own speech; however he recognizes the progress he has made in both intelligibility and appropriate use of compensatory strategies.   Continue goal.    Assessment:

## 2018-09-07 ENCOUNTER — HOSPITAL ENCOUNTER (OUTPATIENT)
Dept: OCCUPATIONAL THERAPY | Age: 68
Setting detail: THERAPIES SERIES
Discharge: HOME | End: 2018-09-07
Payer: MEDICARE

## 2018-09-07 ENCOUNTER — HOSPITAL ENCOUNTER (OUTPATIENT)
Dept: SPEECH THERAPY | Age: 68
Setting detail: THERAPIES SERIES
Discharge: HOME | End: 2018-09-07
Payer: MEDICARE

## 2018-09-07 ENCOUNTER — APPOINTMENT (OUTPATIENT)
Dept: PHYSICAL THERAPY | Age: 68
End: 2018-09-07
Payer: MEDICARE

## 2018-09-07 ENCOUNTER — HOSPITAL ENCOUNTER (OUTPATIENT)
Dept: PHYSICAL THERAPY | Age: 68
Setting detail: THERAPIES SERIES
Discharge: HOME | End: 2018-09-07
Payer: MEDICARE

## 2018-09-07 ENCOUNTER — APPOINTMENT (OUTPATIENT)
Dept: OCCUPATIONAL THERAPY | Age: 68
End: 2018-09-07
Payer: MEDICARE

## 2018-09-07 PROCEDURE — 97116 GAIT TRAINING THERAPY: CPT

## 2018-09-07 PROCEDURE — 97127 HC SP THER IVNTJ W/FOCUS COG FUNCJ: CPT

## 2018-09-07 PROCEDURE — 97530 THERAPEUTIC ACTIVITIES: CPT

## 2018-09-07 PROCEDURE — 97110 THERAPEUTIC EXERCISES: CPT

## 2018-09-07 PROCEDURE — 92507 TX SP LANG VOICE COMM INDIV: CPT

## 2018-09-07 NOTE — FLOWSHEET NOTE
comments   Supine RUE shoulder flexion AROM   2 sets x15 reps Min VCs for elbow extension     Supine RUE shoulder abduction AROM    2 sets x15 reps Min VCs for elbow extension     Supine RUE horizontal ab/adduction   2 sets x15 reps Min VCs for elbow extension     Supine RUE overhead tricep extension 2 sets x10 reps Requires assist for positioning of RUE      Timed Code Treatment Minutes:   55 minutes    Total Treatment Minutes:  55 minutes  10 (TA), 45 (TE)    Assessment     Performance deficits / Impairments: Decreased functional mobility ; Decreased ROM; Decreased strength;Decreased fine motor control;Decreased high-level IADLs;Decreased balance;Decreased coordination    Assessment:  Pt progressing well with RUE supine exercises completing 3/4 exercises RUE only and 75% of reps of horizontal ab/adduction bilaterally. Pt demo'd improved sustenance/strength of R triceps as able to maintain elbow extension during exercises >90% of the time without VCs. Pt most noticeably demo'd improvement with overhead tricep extension completing 2 sets x10 reps versus 1 set of 5 reps from previous attempt. Pt demo'd difficulty with higher level fine motor coordination skills of finger<->palm translation d/t impaired strength and decrease finger dexterity specifically with digits 3-5.  Pt still functioning below baseline and cont to benefit from skilled OT, cont per POC    Patient Education: progress made this date, verb understanding    Treatment/Activity Tolerance:     [x]  Patient tolerated treatment well []  Patient limited by fatigue    []  Patient limited by pain []  Patient limited by other medical complications   []  Other:     Prognosis: [x]  Good []  Fair  []  Poor    Patient Requires Follow-up:  [x]  Yes  []  No    Plan: [x]  Continue per plan of care []  Alter current plan (see comments)   []  Plan of care initiated []  Hold pending MD visit []  Discharge    Plan for Next Session:  RUE strengthening in supine position to focus on elbow extension during exercises. Use e-stim to assist with exercises as needed for strengthening and relearning normal movement patterns. Work on RUE fine/gross motor coordination and R hand strengthening. Incorporate dynamic standing balance activities with functional activities of RUE    Goals  Short term goals  Time Frame for Short term goals: 6 weeks  Short term goal 1: Pt will improve score on BBT to 25 blocks in one minute to demonstrate improved functional grasp/release of R hand  Short term goal 2: Pt will improve time on NHPT to no more than 2 minutes to demonstrate improved fine motor coordination  Short term goal 3: Pt will improve R hand  strength to 30# to facilitate independence with opening tight jars/containers  Short term goal 4: Pt will improve RUE AROM shoulder flexion (in unsupported sit) to 115 degrees to facilitate independence with reaching for items overhead  Short term goal 5: Pt will improve RUE AROM shoulder abduction (in unsupported sit) to 120 degrees to facilitate independence with recreational activities    Long term goals  Time Frame for Long term goals : 8 weeks  Long term goal 1: Pt will improve score on BBT to 35 blocks in one minute to demonstrate improved functional grasp/release of R hand  Long term goal 2: Pt will improve time on NHPT to no more than 1 minute and 30 seconds to demonstrate improved fine motor coordination  Long term goal 3: Pt will improve R hand  strength to 40# to facilitate independence with opening tight jars/containers  Long term goal 4: Pt will improve RUE AROM shoulder flexion (in unsupported sit) to 125 degrees to facilitate independence with reaching for items overhead  Long term goal 5: Pt will improve RUE AROM shoulder abduction (in unsupported sit) to 130 degrees to facilitate independence with recreational activities    Patient Goals   Patient goals :  \"To use right hand\"    Electronically signed by:  Dyana Mcdonald

## 2018-09-07 NOTE — FLOWSHEET NOTE
Speech-Language Pathology  Daily Treatment Note    Date:  18  Patient Name:  Sherren Saner      :  1950    MRN: [id number]  Restrictions/Precautions:    Medical Diagnosis:   I63.9 (ICD-10-CM) - Acute CVA                       Treatment Diagnosis:  Dysarthria (R47.1), Cognitive Communication Deficit (P46.939)   Insurance/Certification information:     Physician Information:  [facility]    Plan of care signed (Y/N):  Y  Visit# / total visits:  8/10  Pain level: denied   G-Code: Motor Speech Current Status (): At least 1 percent but less than 20 percent impaired, limited or restricted  Motor Speech Goal Status (): 0 percent impaired, limited or restricted    Progress Note: []  Yes  [x]  No  Next due by: Visit #10     Subjective:  Cooperative, agreeable. Objective:   Progress Toward Goals:  1.  Pt will articulate multisyllabic words alone and in sentences with 100% accuracy. Goal Met and Discontinued on 18. 2.  Pt will complete word retrieval exercises (including cognitively-complex, word fluency, ... ) independently. Pt was able to generate 5-10 (concrete) categorically-related words with increased time and min-SBA. Pt was able to generate 4-6 (abstract) categorically-related words with increased time and min-mod assistance. Continue goal.    3.  Pt will use compensatory memory strategies independently to recall information, following a short delay, with 95% accuracy. SLP educated pt re: deductive reasoning exercise. Pt was able to demonstrate comprehension and use notetaking strategy to facilitate recall of identified conclusions with minimal assistance. SLP provided similar exercises for home practice. Continue goal.    4.  Pt will demonstrate insight to situation, independently. Goal not directly targeted during this session.   Continue goal.    Assessment:   Dysarthria  Word-Retrieval Impairment  Cognitive Communication Impairment    Plan:   Continue per POC (30 min tx, 3x/week)    Timed Code Treatment: 15 minutes    Total Treatment Time: 30 minutes     Signature:  Renae Padilla M.A., [address] [facility]  Phone: [phone]  Fax: [phone]

## 2018-09-10 ENCOUNTER — HOSPITAL ENCOUNTER (OUTPATIENT)
Dept: OCCUPATIONAL THERAPY | Age: 68
Setting detail: THERAPIES SERIES
Discharge: HOME | End: 2018-09-10
Payer: MEDICARE

## 2018-09-10 ENCOUNTER — HOSPITAL ENCOUNTER (OUTPATIENT)
Dept: SPEECH THERAPY | Age: 68
Setting detail: THERAPIES SERIES
Discharge: HOME | End: 2018-09-10
Payer: MEDICARE

## 2018-09-10 ENCOUNTER — HOSPITAL ENCOUNTER (OUTPATIENT)
Dept: PHYSICAL THERAPY | Age: 68
Setting detail: THERAPIES SERIES
Discharge: HOME | End: 2018-09-10
Payer: MEDICARE

## 2018-09-10 ENCOUNTER — APPOINTMENT (OUTPATIENT)
Dept: OCCUPATIONAL THERAPY | Age: 68
End: 2018-09-10
Payer: MEDICARE

## 2018-09-10 ENCOUNTER — APPOINTMENT (OUTPATIENT)
Dept: PHYSICAL THERAPY | Age: 68
End: 2018-09-10
Payer: MEDICARE

## 2018-09-10 PROCEDURE — 97116 GAIT TRAINING THERAPY: CPT | Performed by: PHYSICAL THERAPIST

## 2018-09-10 PROCEDURE — 97530 THERAPEUTIC ACTIVITIES: CPT

## 2018-09-10 PROCEDURE — 97530 THERAPEUTIC ACTIVITIES: CPT | Performed by: PHYSICAL THERAPIST

## 2018-09-10 PROCEDURE — 97110 THERAPEUTIC EXERCISES: CPT | Performed by: PHYSICAL THERAPIST

## 2018-09-10 PROCEDURE — 92507 TX SP LANG VOICE COMM INDIV: CPT

## 2018-09-10 PROCEDURE — 97127 HC SP THER IVNTJ W/FOCUS COG FUNCJ: CPT

## 2018-09-10 PROCEDURE — 97110 THERAPEUTIC EXERCISES: CPT

## 2018-09-10 NOTE — PROGRESS NOTES
Outpatient Occupational Therapy  Phone: [phone] Fax: [phone]    To: Dr. Mani Birmingham               From: Lea Joshi   Patient: Peyman Chew       : 1950  Diagnosis: L ischemic stroke affecting R side (O02.116)  MRN: [id number]     Treatment Diagnosis: R side hemiparesis    Date: 9/10/2018       Occupational Therapy Progress/Discharge Note    Total Visits to date:    10                              Cancels/No-shows to date: 0    Plan of Care/Treatment to date:  [x] Therapeutic Exercise    [x] Modalities:  [x] Therapeutic Activity     [] Ultrasound  [x] Electrical Stimulation  [] Total Motion Release     [] Fluidotherapy [] Marco Kate  [x] Neuromuscular Re-education  [] Cold/hotpack [] Iontophoresis  [x] Instruction in HEP     Other:  [x] Manual Therapy      []            [] Aquatic Therapy      []          ? Significant Findings At Last Visit/Comments:    Pt is demo'ing improved strength in RUE including hand, however, cont to require facilitation and verbal cues to inhibit ineffective movement patterns and facilitate effective movement patterns to maximize functional use of RUE. Pt requires increase time with coordination activities d/t impaired dexterity of R hand. Pt still functioning below baseline and cont to benefit from skilled OT, cont per POC      Progress toward goals:       Goals  Short term goals  Time Frame for Short term goals: 6 weeks - all goals progressing, continue based on observation.  Plan to formally assess next session  Short term goal 1: Pt will improve score on BBT to 25 blocks in one minute to demonstrate improved functional grasp/release of R hand  Short term goal 2: Pt will improve time on NHPT to no more than 2 minutes to demonstrate improved fine motor coordination  Short term goal 3: Pt will improve R hand  strength to 30# to facilitate independence with opening tight jars/containers  Short term goal 4: Pt will improve RUE AROM shoulder flexion approved by physician

## 2018-09-10 NOTE — FLOWSHEET NOTE
leading with the toes)   PT also instructed pt with the following ex:    Bridges with side stepping side to side ( hips are in the air) Yulisa 5 reps in each direction. Pt progressed to sitting activities in which pt sat on a therapeutic ball. Pt struggled with balance since the ball did not appear large enough to accommodate pt's height. ( hips were lower than knees resulting in a significant posterior pelvic tilt.) For this reason, the activity was modified with the BOSU ball placed on the hi-lo table with the blue side up and the table elevated so that B feet were not supported. This simulated an uneven surface. PT was then instructed with activities to challenge pt's balance. This included:   1. Visual scanning in all directions with head turns  2. UE AROM ex : B shld flex, B shld abd and forwards \"alternating punching\" while performing a cognitive task. The cognitive tasks varied from answering multiplication problems to  spelling the answers to various questions. (For example, while sitting on uneven surface, pt would move UES into flex while answering:who is the [employer] QB. This challenged pt's cognition while challenging the coordination of B UES. (pt demo good dynamic sitting balance and was able to move the RUE more fluently with improved range than he initially thought was possible.)  3. PT progressed pt to standing activities including standing on the BOSU ball with blue side up. Pt verbalized uneasiness with this task but was still able to perform. Pt stood on the ball for approx 3 minutes with mod/min A. Pt yulisa min WS as evidenced by pt visually scanning from side to side. Pt reported increased RLE fatigue and dem increased \"shakiness\" so this activity was stopped. 4. PT instructed pt with standing tandem with R foot supported on the BOSU ball and the L foot supported on regular surface. ( the opposite was also tolerated)    Gt: PT instructed pt with gt training with a SPC.  Pt demo an inconsistent R HS and excessive upper trunk lat swat when initiating R swing through. PT removed the 1/2 inch wedge that was placed in L shoe in the past. This facilitated improved gt dynamics although pt reported that it was going to take time to get use to. The RAFO remains intact. Pt appears more confident with amb with a SPC    Timed Code Treatment Minutes: 20 min therapeutic exercise, 30 min therapeutic activities,  10 minutes gait    Total Treatment Minutes:  60 minutes     Treatment/Activity Tolerance:  [x] Patient tolerated treatment well [x] Patient limited by fatigue  [] Patient limited by pain  [] Patient limited by other medical complications  [] Other:     Assessment : Pt is progressing with overall mobility but remains fixated on the decreased abilities of the R extrem. . Pt cont to be well motivated and maximize effort throughout the therapy session. Pt is still below baseline and would benefit from continued therapy to maximize potential and increase functional mobility to IND as prior to onset of CVA.   Prognosis: [x] Good [] Fair  [] Poor    Patient Requires Follow-up: [x] Yes  [] No    Goals:  Short term goals  Time Frame for Short term goals: 4 weeks  Short term goal 1: Pt to be indep with HEP to max rehab potential  Short term goal 2: Pt to illustrate good to normal sitting balance for ease of transfers and bed mobility skills/ADLS  Short term goal 3: Pt to illustrate improve functional mobility by dec time per TUG from 32 seconds to less than 20 sessions with least restrictive assistive device  Short term goal 4: Pt to dec impairment per TEJADA to less than 40%   Short term goal 5: Pt to dec impairment per Tinetti to less than 40%   Short term goal 6: Pt to dec impairment per DGI to less than 40%   Short term goal 7: Pt to improve functional strength in LE evident by improving sit/ 30 seconds from 6 with use of hands to without UE support/use 30 seconds to complete >/=10reps with good descending control and without relying on the back of stabilized chair  Short term goal 8: Improve involved LE functional strength to >/3+ to4-/5 needed for gait mechanics, ease of heel off/toe off and initial heel strike on the involved LE   Long term goals  Time Frame for Long term goals : 8 weeks  Long term goal 1: Pt to be indep with all functional transfers and mobility all surfaces >/=1000ft to return as safe community ambulation   Long term goal 2: Pt to ascend/descend flight of stairs indep for return to prior level and for community/family outtings/gatherings  Long term goal 3: Pt to dec impairment Per TEJADA Tinetti DGY to 0% impairment   Long term goal 4: pt remain fall free at home and in therapy and confidence with ADLs/IADLS mobility in home and community to >/=75%     Plan:   [x] Continue per plan of care [] Alter current plan (see comments)  [] Plan of care initiated [] Hold pending MD visit [] Discharge    Plan for Next Session: Cont with POC      Electronically signed by:  Jose Rafael Barnett

## 2018-09-10 NOTE — FLOWSHEET NOTE
Speech-Language Pathology  Daily Treatment Note / PROGRESS NOTE    Date:  09/10/18  Patient Name:  Deisy Beck      :  1950    MRN: [id number]  Restrictions/Precautions:    Medical Diagnosis:   I63.9 (ICD-10-CM) - Acute CVA                       Treatment Diagnosis:  Dysarthria (R47.1), Cognitive Communication Deficit (K46.980)   Insurance/Certification information:     Physician Information:  [facility]    Plan of care signed (Y/N):  Y  Visit# / total visits:  10/10  Pain level: denied   G-Code: Motor Speech Current Status (): At least 1 percent but less than 20 percent impaired, limited or restricted  Motor Speech Goal Status (): 0 percent impaired, limited or restricted    Progress Note: []  Yes  [x]  No  Next due by: Visit #10     Subjective:  Pt reports some coughing while drinking liquids 1x over the weekend; SLP educated pt re: occassional vs. chronic aspiration. Objective:   Progress Toward Goals:  1.  Pt will articulate multisyllabic words alone and in sentences with 100% accuracy. Goal Met and Discontinued on 18. 2.  Pt will complete word retrieval exercises (including cognitively-complex, word fluency, ... ) independently. Pt was able to generate 8 phonemically-related words with relatively steady fluency. Continue goal.    3.  Pt will use compensatory memory strategies independently to recall information, following a short delay, with 95% accuracy. Goal not directly targeted during this session. Pt had completed deductive reasoning exercise previously provided for home practice. He was able to recall and apply previously-discussed strategies to complete additional (Perplexor, Basic Level) exercise with increased time and SBA. Continue goal.    4.  Pt will demonstrate insight to situation, independently. Pt was able to identify improvements he has made (in the areas of word retrieval and cognitive-communication) with min-SBA.   Pt reports, \" I feel like I'm

## 2018-09-10 NOTE — FLOWSHEET NOTE
Occupational Therapy Daily Treatment Note    Date:  9/10/2018    Patient Name:  Debbie Ruiz     :  1950  MRN: [id number]  Restrictions/Precautions: none. Has AFO for RLE   Medical/Treatment Diagnosis Information:  · L ischemic stroke affecting R side (L hand dominant)   · R side hemiparesis   Insurance/Certification information: Medicare    Physician Information: Dr. Queenie Sacks of care signed (Y/N):  Y  Visit# / total visits: 10 / 24  Pain level: 0/10     OT G-codes ()  Functional Assessment Tool Used: Box and Blocks  Score: 15  Functional Limitation: Carrying, moving and handling objects  Carrying, Moving and Handling Objects Current Status (): At least 80 percent but less than 100 percent impaired, limited or restricted  Carrying, Moving and Handling Objects Goal Status (): At least 60 percent but less than 80 percent impaired, limited or restricted     Functional Assessment Tool Used: Vanessa Cobos  Score: 26  Functional Limitation: Self care  Self Care Current Status (): At least 20 percent but less than 40 percent impaired, limited or restricted  Self Care Goal Status (): At least 1 percent but less than 20 percent impaired, limited or restricted    Progress Note: [x]  Yes  []  No  Next due by: Visit #10      Subjective: pt denied new issues/complaints    Therapeutic Activities:    Activity #1: Underhand dealing of deck of cards with R hand to work on finger dexterity of digits 3-5. Pt required increase time to complete 2 sets x ~26 cards and one set x52 cards. Pt required tactile and verbal cues to inhibit shoulder hiking with limited success, however, pt reported increase awareness    Activity #2:  Pt in stance to work on R hand functional grasp/release of containers x6, manipulating lids off/on with R hand (x6) and placing small blocks/balls (x3/container) into container using R hand.  Pt required VCs to inhibit ineffective movement patterns/compensatory movement Jeane Huynh,OTR/L

## 2018-09-12 ENCOUNTER — HOSPITAL ENCOUNTER (OUTPATIENT)
Dept: PHYSICAL THERAPY | Age: 68
Setting detail: THERAPIES SERIES
Discharge: HOME | End: 2018-09-12
Payer: MEDICARE

## 2018-09-12 ENCOUNTER — HOSPITAL ENCOUNTER (OUTPATIENT)
Dept: SPEECH THERAPY | Age: 68
Setting detail: THERAPIES SERIES
Discharge: HOME | End: 2018-09-12
Payer: MEDICARE

## 2018-09-12 ENCOUNTER — APPOINTMENT (OUTPATIENT)
Dept: OCCUPATIONAL THERAPY | Age: 68
End: 2018-09-12
Payer: MEDICARE

## 2018-09-12 ENCOUNTER — APPOINTMENT (OUTPATIENT)
Dept: PHYSICAL THERAPY | Age: 68
End: 2018-09-12
Payer: MEDICARE

## 2018-09-12 PROCEDURE — 92507 TX SP LANG VOICE COMM INDIV: CPT

## 2018-09-12 PROCEDURE — 97127 HC SP THER IVNTJ W/FOCUS COG FUNCJ: CPT

## 2018-09-12 PROCEDURE — 97530 THERAPEUTIC ACTIVITIES: CPT | Performed by: PHYSICAL THERAPIST

## 2018-09-12 PROCEDURE — 97110 THERAPEUTIC EXERCISES: CPT | Performed by: PHYSICAL THERAPIST

## 2018-09-12 PROCEDURE — 97116 GAIT TRAINING THERAPY: CPT | Performed by: PHYSICAL THERAPIST

## 2018-09-12 NOTE — FLOWSHEET NOTE
Outpatient Physical Therapy  Phone: [phone] Fax: [phone]    To:      From: Lottie Oconnor, PT   Date: 2018  Patient: Edgar Esquivel     : 1950 MRN: [id number]  Medical Diagnosis:    Treatment Diagnosis:        Physical Therapy Progress/ Note    Time Period for Report: 2018-2018     Total Visits to date: 10   Cancels/No-shows to date:  0    Plan of Care/Treatment to date:  [x] Therapeutic Exercise (Review/Progress HEP and provide verbal/tactile cueing for activities related to strengthening, flexibility,  endurance, ROM.)       [x] Therapeutic Activity (Provide verbal/tactile cueing for dynamic activities to promote functional tasks.)          [x] Gait Training (Provide verbal/tactile/visual cueing for facilitation of normalized gait pattern without or with the least restrictive AD to decrease pain and/or risk for falling.)          [x] Neuromuscular Re-education (Review/Progress HEP and provide verbal/tactile cueing for activities related to improving balance, coordination, kinesthetic sense, posture, motor skill, proprioception.)         [] Manual Therapy (Provide manual therapy to mobilize soft tissue/joints for the purpose of modulating pain, promoting relaxation, increasing ROM, reducing/eliminating soft tissue swelling/inflammation/tightness, improving soft tissue extensibility)                [] Modalities (For modulating pain/tenderness/paresthesias, reducing swelling/inflammation/tightness, improving soft tissue extensibility, and/or to increase muscle tone/strength):     [] Ultrasound  [] Electrical Stimulation        [] Cervical Traction [] Lumbar Traction    ? [] Cold/hotpack [] Iontophoresis   Other:      []          []     Significant Findings At Last Visit/Comments: The below balance/ gt assessments completed with scores stated. Pt is continuing to improve.     ·       Progress towards goals:    OutComes Score on admission      Outcome scores on 2018  Rosemarie Croweely Balance Score: 16                        Tejada Balance 45         Tinetti Total Score: 6                       Tinetti total score: 26          TU secs                                  TUG: 15.67 secs  Goals  Short term goals  Time Frame for Short term goals: 4 weeks  Short term goal 1: Pt to be indep with HEP to max rehab potential. Goal achieved including updated ex  Short term goal 2: Pt to illustrate good to normal sitting balance for ease of transfers and bed mobility skills/ADLS. Goal achieved  Short term goal 3: Pt to illustrate improve functional mobility by dec time per TUG from 32 seconds to less than 20 sessions with least restrictive assistive device. Goal achieved  And upgraded to Pt to increase TUG score to <13 secs indicating a low fall risk  Short term goal 4: Pt to dec impairment per TEJADA to less than 40%   Short term goal 5: Pt to dec impairment per Tinetti to less than 40%   Short term goal 6: Pt to dec impairment per DGI to less than 40%   Short term goal 7: Pt to improve functional strength in LE evident by improving sit/ 30 seconds from 6 with use of hands to without UE support/use 30 seconds to complete >/=10reps with good descending control and without relying on the back of stabilized chair      Frequency/Duration:  # Days per week: [] 1 day # Weeks: [] 1 week [x] 4 weeks      [] 2 days? [] 2 weeks [] 5 weeks      [x] 3 days   [] 3 weeks [] 6 weeks     Rehab Potential: [x] Excellent [] Good [] Fair  [] Poor     Goal Status:  [] Achieved [x] Partially Achieved and goals upgraded  [] Not Achieved     Patient Status: [x] Continue per initial plan of Care     [] Patient now discharged     [] Additional visits requested, Please re-certify for additional visits:      Requested frequency/duration: 3 X/week for 4  weeks    Electronically signed by:  Brigitte Haynes PT    If you have any questions or concerns, please don't hesitate to call.   Thank you for your referral.    Physician

## 2018-09-14 ENCOUNTER — HOSPITAL ENCOUNTER (OUTPATIENT)
Dept: SPEECH THERAPY | Age: 68
Setting detail: THERAPIES SERIES
Discharge: HOME | End: 2018-09-14
Payer: MEDICARE

## 2018-09-14 ENCOUNTER — APPOINTMENT (OUTPATIENT)
Dept: PHYSICAL THERAPY | Age: 68
End: 2018-09-14
Payer: MEDICARE

## 2018-09-14 ENCOUNTER — HOSPITAL ENCOUNTER (OUTPATIENT)
Dept: VASCULAR LAB | Age: 68
Discharge: HOME | End: 2018-09-14
Payer: MEDICARE

## 2018-09-14 ENCOUNTER — HOSPITAL ENCOUNTER (OUTPATIENT)
Dept: PHYSICAL THERAPY | Age: 68
Setting detail: THERAPIES SERIES
Discharge: HOME | End: 2018-09-14
Payer: MEDICARE

## 2018-09-14 ENCOUNTER — HOSPITAL ENCOUNTER (OUTPATIENT)
Dept: OCCUPATIONAL THERAPY | Age: 68
Setting detail: THERAPIES SERIES
Discharge: HOME | End: 2018-09-14
Payer: MEDICARE

## 2018-09-14 ENCOUNTER — APPOINTMENT (OUTPATIENT)
Dept: OCCUPATIONAL THERAPY | Age: 68
End: 2018-09-14
Payer: MEDICARE

## 2018-09-14 PROCEDURE — 97110 THERAPEUTIC EXERCISES: CPT

## 2018-09-14 PROCEDURE — G8985 CARRY GOAL STATUS: HCPCS

## 2018-09-14 PROCEDURE — 97530 THERAPEUTIC ACTIVITIES: CPT | Performed by: PHYSICAL THERAPIST

## 2018-09-14 PROCEDURE — 97127 HC SP THER IVNTJ W/FOCUS COG FUNCJ: CPT

## 2018-09-14 PROCEDURE — 97110 THERAPEUTIC EXERCISES: CPT | Performed by: PHYSICAL THERAPIST

## 2018-09-14 PROCEDURE — 97530 THERAPEUTIC ACTIVITIES: CPT

## 2018-09-14 PROCEDURE — G8988 SELF CARE GOAL STATUS: HCPCS

## 2018-09-14 PROCEDURE — 97116 GAIT TRAINING THERAPY: CPT | Performed by: PHYSICAL THERAPIST

## 2018-09-14 PROCEDURE — G8987 SELF CARE CURRENT STATUS: HCPCS

## 2018-09-14 PROCEDURE — G8984 CARRY CURRENT STATUS: HCPCS

## 2018-09-14 PROCEDURE — 93880 EXTRACRANIAL BILAT STUDY: CPT

## 2018-09-14 NOTE — PROGRESS NOTES
severity of the following symptoms in the last week. None Mild Moderate Severe Extreme   9. Arm, shoulder or hand pain    [] 1  [x] 2  [] 3  [] 4  [] 5   10. Tingling (pins and needles) in your arm, shoulder or hand    [] 1  [] 2  [x] 3  [] 4  [] 5      No Difficulty Mild Difficulty Moderate Difficulty Severe Difficulty So Much Difficulty That I Can't Sleep    11. During the past week, how much difficulty have you had sleeping because of the pain in your arm, shoulder or hand? [] 1  [] 2  [x] 3  [] 4  [] 5     Sum of Scores: _31___    QuickDASH Disability/Symptom Score (as found below): __CK__    Since the beginning of therapy, my condition has improved: 40%  During the past 24 hours, my maximum pain rating was: 5    QuickDASH Disability/Symptom Score =     A QuickDASH score may not be calculated if there is a greater than 1 missing item.     G-Code Crosswalk:  Quick DASH Total Score Disability Index CMS Modifier   11 or less 0% []CH   12-19 1-19% []CI   20-28 20-39% []CJ   29-37 40-59% [x]CK   38-46 60-79% []CL   47-54 80-99% []CM   55 100% []CN

## 2018-09-14 NOTE — FLOWSHEET NOTE
the following standing ex while standing on the blue airex cushion:    1. Isometrics with resistance provided at shlds, hips or the combination  2. PT instructed pt with the following dynamic standing balance activities standing unsupported:  3.. Bouncing a tennis ball and catching it with R hand   4. Bouncing a tennis ball from R<>  Pt req SBA  and yulisa 15 reps of each task  PT instructed pt with the following ex in sitting to facilitate increased DF. This included :  Sitting on EOM and using only the R foot to roll a mimi back and for the in between a designated area x 8 rep  Sitting on EOM and using only the R foot to roll tennis ball back and forth  between a designated area x 8 rep     As a strengthening ex for the LE flexors, PT instructed pt with sitting in a chair with wheels and instructed pt to step to propel chair. This ex was very fatiguing to pt after tolerating 70'. PT minimized compensations as pt was instructed to maintain upper trunk against the back of the chair. Timed Code Treatment Minutes: 25 min therapeutic exercise, 25 min therapeutic activities, 10 minutes gait    Total Treatment Minutes:  60 minutes     Treatment/Activity Tolerance:  [x] Patient tolerated treatment well [x] Patient limited by fatigue  [] Patient limited by pain  [] Patient limited by other medical complications  [] Other:     Assessment : Pt is progressing well with mobility appearing more confident with his abilities. Pt's hx of a neuropathy in the RLE intermittently affects pt's walking and performance with activities. Pt cont to be well motivated and maximize effort throughout the therapy session. Pt is still below baseline and would benefit from continued therapy to maximize potential and increase functional mobility to IND as prior to onset of CVA.   Prognosis: [x] Good [] Fair  [] Poor    Patient Requires Follow-up: [x] Yes  [] No    Goals:  Short term goals  Time Frame for Short term goals: 4 weeks  Short term

## 2018-09-14 NOTE — PROGRESS NOTES
demonstrate improved fine motor coordination - goal met 9/14  Long term goal 3: Pt will improve R hand  strength to 40# to facilitate independence with opening tight jars/containers - goal met 9/14  Long term goal 4: Pt will improve RUE AROM shoulder flexion (in unsupported sit) to 125 degrees to facilitate independence with reaching for items overhead - progressing, continue  Long term goal 5: Pt will improve RUE AROM shoulder abduction (in unsupported sit) to 130 degrees to facilitate independence with recreational activities - progressing, continue    Patient Goals   Patient goals :  \"To use right hand\"    Electronically signed by:  Tray Muller

## 2018-09-14 NOTE — PROGRESS NOTES
flexion (in unsupported sit) to 115 degrees to facilitate independence with reaching for items overhead - progressing, continue  Short term goal 5: Pt will improve RUE AROM shoulder abduction (in unsupported sit) to 120 degrees to facilitate independence with recreational activities - progressing, continue     Long term goals  Time Frame for Long term goals : 8 weeks  Long term goal 1: Pt will improve score on BBT to 35 blocks in one minute to demonstrate improved functional grasp/release of R hand - progressing, continue  Long term goal 2: Pt will improve time on NHPT to no more than 1 minute and 30 seconds to demonstrate improved fine motor coordination - goal met 9/14  Long term goal 3: Pt will improve R hand  strength to 40# to facilitate independence with opening tight jars/containers - goal met 9/14  Long term goal 4: Pt will improve RUE AROM shoulder flexion (in unsupported sit) to 125 degrees to facilitate independence with reaching for items overhead - progressing, continue  Long term goal 5: Pt will improve RUE AROM shoulder abduction (in unsupported sit) to 130 degrees to facilitate independence with recreational activities - progressing, continue     Patient Goals   Patient goals : \"To use right hand\"       Frequency/Duration:  # Days per week: [] 1 day # Weeks: [] 1 week [] 4 weeks      [] 2 days? [] 2 weeks [] 5 weeks     [x] 3 days   [] 3 weeks [x] 8 weeks     Rehab Potential: [] Excellent [x] Good [] Fair  [] Poor     Goal Status:  [] Achieved [x] Partially Achieved  [] Not Achieved     Patient Status: [x] Continue per initial plan of Care     [] Patient now discharged     [] Additional visits requested, Please re-certify for additional visits:      Requested additional frequency/duration: X/week for weeks    Electronically signed by:  Queen Amy, OTR/L    If you have any questions or concerns, please don't hesitate to call.   Thank you for your referral.    Physician Signature:________________________________Date:__________________  By signing above, therapists plan is approved by physician

## 2018-09-15 NOTE — FLOWSHEET NOTE
Speech-Language Pathology  Daily Treatment Note    Date:  18  Patient Name:  Terri Friday      :  1950    MRN: [id number]  Restrictions/Precautions:    Medical Diagnosis:   I63.9 (ICD-10-CM) - Acute CVA                       Treatment Diagnosis:  Dysarthria (R47.1), Cognitive Communication Deficit (K96.346)   Insurance/Certification information:     Physician Information:  [facility]    Plan of care signed (Y/N):  Y  Visit# / total visits:  1/10  Pain level: denied   G-Code: Motor Speech Current Status (): At least 1 percent but less than 20 percent impaired, limited or restricted  Motor Speech Goal Status (): 0 percent impaired, limited or restricted    Progress Note: []  Yes  [x]  No  Next due by: Visit #10 or 10/10/18    Subjective:  Pt has completed some Perplexor puzzles provided at prior session and requires assistance for some. Objective:   Progress Toward Goals:  2.  Pt will complete word retrieval exercises (including cognitively-complex, word fluency, ... ) independently. Pt was able to generate 6-12 (abstract) categorically-related words. Continue goal.    3.  Pt will use compensatory memory strategies independently to recall information, following a short delay, with 95% accuracy. Goal not directly targeted during this session. Continue goal.    4.  Pt will demonstrate insight to situation, independently. Goal not directly targeted during this session. Continue goal.    5.  Pt will complete complex reasoning exercises independently. Pt was able to complete Perplexor puzzles with minimal assistance - primarily to reduce impulsivity.   Contineu goal.    Assessment:   Word-Retrieval Impairment  Cognitive Communication Impairment    Plan:   Continue per POC (30 min tx, 2x/week)    Timed Code Treatment: 15 minutes    Total Treatment Time: 30 minutes     Signature:  Fermin Oconnell M.A., [address] [facility]

## 2018-09-15 NOTE — PROGRESS NOTES
home practice. He was able to recall and apply previously-discussed strategies to complete additional (Perplexor, Basic Level) exercise with increased time and SBA. Continue goal.       4.  Pt will demonstrate insight to situation, independently. Pt was able to identify improvements he has made (in the areas of word retrieval and cognitive-communication) with min-SBA. Pt reports, \" I feel like I'm getting close to thinking like myself. \"  Pt verbalized concerns that he has perceives increased difficulty with maintaining a loud vocal intensity (for example conversing at a crowded party). SLP educated pt re: diaphragmatic breathing to support vocal intensity; pt verbalized and demonstrated comprehension. Continue goal.    Add Goal:   5. Pt will complete complex reasoning exercises independently. Frequency/Duration:  # Days per week: [] 1 day   # Weeks: [] 1 week [] 5 weeks      [x] reduce to 2 days? [] 2 weeks [] 6 weeks     [] 3 days     [] 3 weeks [] 7 weeks     [] 4 days     [x] 4 weeks [] 8 weeks    Rehab Potential: [] Excellent [x] Good [] Fair  [] Poor       Electronically signed by:  Darylene Morocco, M.A., [address] [facility]  Speech-Language Pathologist  [address] [facility]  Phone: [phone]  Fax: [phone]        If you have any questions or concerns, please don't hesitate to call.   Thank you for your referral.      Physician Signature:________________________________Date:__________________  By signing above, therapists plan is approved by physician

## 2018-09-17 ENCOUNTER — HOSPITAL ENCOUNTER (OUTPATIENT)
Dept: PHYSICAL THERAPY | Age: 68
Setting detail: THERAPIES SERIES
Discharge: HOME | End: 2018-09-17
Payer: MEDICARE

## 2018-09-17 ENCOUNTER — HOSPITAL ENCOUNTER (OUTPATIENT)
Dept: OCCUPATIONAL THERAPY | Age: 68
Setting detail: THERAPIES SERIES
Discharge: HOME | End: 2018-09-17
Payer: MEDICARE

## 2018-09-17 ENCOUNTER — APPOINTMENT (OUTPATIENT)
Dept: PHYSICAL THERAPY | Age: 68
End: 2018-09-17
Payer: MEDICARE

## 2018-09-17 ENCOUNTER — HOSPITAL ENCOUNTER (OUTPATIENT)
Dept: SPEECH THERAPY | Age: 68
Setting detail: THERAPIES SERIES
Discharge: HOME | End: 2018-09-17
Payer: MEDICARE

## 2018-09-17 ENCOUNTER — APPOINTMENT (OUTPATIENT)
Dept: OCCUPATIONAL THERAPY | Age: 68
End: 2018-09-17
Payer: MEDICARE

## 2018-09-17 PROCEDURE — 92507 TX SP LANG VOICE COMM INDIV: CPT

## 2018-09-17 PROCEDURE — 97127 HC SP THER IVNTJ W/FOCUS COG FUNCJ: CPT

## 2018-09-17 PROCEDURE — 97116 GAIT TRAINING THERAPY: CPT | Performed by: PHYSICAL THERAPIST

## 2018-09-17 PROCEDURE — 97110 THERAPEUTIC EXERCISES: CPT | Performed by: PHYSICAL THERAPIST

## 2018-09-17 PROCEDURE — 97530 THERAPEUTIC ACTIVITIES: CPT

## 2018-09-17 PROCEDURE — 97110 THERAPEUTIC EXERCISES: CPT

## 2018-09-17 NOTE — FLOWSHEET NOTE
BLE 15 reps   20 degrees actively achieved by pt, needs therapist help to achieve full range. Prone hamstring curl RLE only in range not actively achieved by pt, from 20-90 pt placed in position further than actively achieved to strength in later range AAROM in the needed range  10 reps CGA x2 sets  Rec to have wife assist with this at home   Attempted resistance in this with isometrics but unable to complete with resistance   Completed to improve full AROM and end range strength   Prone hip extension with leg straight  RLE 10 reps x2 sets Completes without assist    HEP added 9/5/18   Without weight  In HEP added 9/5/18    Sit to stand from 21 inch height mat, on airex  Without airex  X 5 reps       x5 reps x2 sets  Heavily relies and WB LLE     Static standing head gazes with narrow base of support on airex  10 reps x 2 sets  x2 LOB   Mod assist of PT to recovery and assist pt to mat  For strengthening and balance    Static standing modified tandem UE reaching   x10 reps  Mod inc sway    Static standing eyes closed narrow base of support  Several attempts unable hold longer than 5 seconds without falling    Alt BLE forward lunge with mini squat on BOSU 10 reps  Without AFO  1 UE needed for balance and to improve form  Pt completes without cues without 50% of time but knee does not buckle but more cues for form needed. Gait: indep SBA  50 ft x 4 Compensates by hip circumduction, at times foot slapped noted  Hx of neuropathy as well   Gait with SPC 200ft x2  SPC supervision without tripping improved foot cleareance on the RLE noted. Other Therapeutic Activities:   PT had pt remove the AFO so that he was able to optimize the ankle strategies. PT then instructed pt with use of the treadmill to facilitate consistency with stepping; Treadmill was placed in a horizontal position with speed lowered to .7 MPH. PT instructed pt to steps in :   1. Forwards direction  X 5 minutes  2.  Side stepping with the R leading x 2 min 38 secs ( speed lowered to . 5)  3. Side stepping with the L leading x 3min and 28 secs ( speed cont at . 5)  4. Backwards x 4 minutes ( speed lowered to . 4MPH)  Pt reported feeling most challenged by the backwards walking since it did not feel \"natural\" to him with the treadmill going forward    PT checked pt's HEP to be sure that he was performing to obtain max benefit. 1. 1/2 bridge without use of the AFO. The LLE is in figure \"4\" on the R LE. Pt instructed to do the ex to both LES. A bridge is performed in which ankle PF/ DF / and neutral are performed  with the supporting LE resulting in a slow count of \"3\" for  increasing the R ankle strategies. Sonia 5 reps     PT added the following ex: Pt in prone and doing a CW and CCW direction of lifting each extrem up while spelling the holidays out associated with the chronological months. Pt was unable to complete when UES were positioned above shld height but able to complete when UES were positioned next to sides. Pt performed well. PT then instructed pt with using a large therapeutic ball and performing the following;   1. Bridges with LES extended and doing an alternating lift with each LE ( UES positioned across chest)  2. Alternating stepping with heels in contact with ball flexing <>extending knees full range  3. Alternating stepping with heels in contact with ball and side stepping in each direction  Sonia 10 reps of each to BLES    Pt amb with the AFO intact and with SPC to outside       Timed Code Treatment Minutes:35 min therapeutic exercise, 25 minutes gait    Total Treatment Minutes:  60 minutes     Treatment/Activity Tolerance:  [x] Patient tolerated treatment well [x] Patient limited by fatigue  [] Patient limited by pain  [] Patient limited by other medical complications  [] Other:     Assessment :Pt appeared very challenged with use of the treadmill . Pt cont with increased movement in R ankle.   Pt cont to be well motivated and

## 2018-09-17 NOTE — FLOWSHEET NOTE
Occupational Therapy Daily Treatment Note    Date:  2018    Patient Name:  Annmarie Jones     :  1950  MRN: [id number]  Restrictions/Precautions: none. Has AFO for RLE   Medical/Treatment Diagnosis Information:  · L ischemic stroke affecting R side (L hand dominant)   · R side hemiparesis   Insurance/Certification information: Medicare    Physician Information: Dr. Carolyne Felton of care signed (Y/N):  Y  Visit# / total visits:   Pain level: 0/10     OT G-codes ()  Functional Assessment Tool Used: Box and Blocks  Score: 15  Functional Limitation: Carrying, moving and handling objects  Carrying, Moving and Handling Objects Current Status (): At least 80 percent but less than 100 percent impaired, limited or restricted  Carrying, Moving and Handling Objects Goal Status (): At least 60 percent but less than 80 percent impaired, limited or restricted     Functional Assessment Tool Used: Sylvia Amezcuahead  Score: 26  Functional Limitation: Self care  Self Care Current Status (): At least 20 percent but less than 40 percent impaired, limited or restricted  Self Care Goal Status (): At least 1 percent but less than 20 percent impaired, limited or restricted    OT G-codes ()  Functional Assessment Tool Used: Box and Blocks  Score: 34  Functional Limitation: Carrying, moving and handling objects  Carrying, Moving and Handling Objects Current Status (): At least 60 percent but less than 80 percent impaired, limited or restricted  Carrying, Moving and Handling Objects Goal Status (): At least 60 percent but less than 80 percent impaired, limited or restricted     Functional Assessment Tool Used: Sylvia Austynkenhead  Score: 26  Functional Limitation: Self care  Self Care Current Status (): At least 40 percent but less than 60 percent impaired, limited or restricted  Self Care Goal Status ():  At least 1 percent but less than 20 percent impaired, limited or tolerated treatment well []  Patient limited by fatigue    []  Patient limited by pain []  Patient limited by other medical complications   []  Other:     Prognosis: [x]  Good []  Fair  []  Poor    Patient Requires Follow-up:  [x]  Yes  []  No    Plan: [x]  Continue per plan of care []  Alter current plan (see comments)   []  Plan of care initiated []  Hold pending MD visit []  Discharge    Plan for Next Session:  RUE strengthening in supine position to focus on elbow extension during exercises. Use e-stim to assist with exercises as needed for strengthening and relearning normal movement patterns. Work on RUE fine/gross motor coordination and R hand strengthening.  Incorporate dynamic standing balance activities with functional activities of RUE    Goals  Short term goals  Time Frame for Short term goals: 6 weeks   Short term goal 1: Pt will improve score on BBT to 25 blocks in one minute to demonstrate improved functional grasp/release of R hand - goal met 9/14  Short term goal 2: Pt will improve time on NHPT to no more than 2 minutes to demonstrate improved fine motor coordination - goal met 9/14  Short term goal 3: Pt will improve R hand  strength to 30# to facilitate independence with opening tight jars/containers - goal met 9/14  Short term goal 4: Pt will improve RUE AROM shoulder flexion (in unsupported sit) to 115 degrees to facilitate independence with reaching for items overhead - progressing, continue  Short term goal 5: Pt will improve RUE AROM shoulder abduction (in unsupported sit) to 120 degrees to facilitate independence with recreational activities - progressing, continue    Long term goals  Time Frame for Long term goals : 8 weeks  Long term goal 1: Pt will improve score on BBT to 35 blocks in one minute to demonstrate improved functional grasp/release of R hand - progressing, continue  Long term goal 2: Pt will improve time on NHPT to no more than 1 minute and 30 seconds to demonstrate improved fine motor coordination - goal met 9/14  Long term goal 3: Pt will improve R hand  strength to 40# to facilitate independence with opening tight jars/containers - goal met 9/14  Long term goal 4: Pt will improve RUE AROM shoulder flexion (in unsupported sit) to 125 degrees to facilitate independence with reaching for items overhead - progressing, continue  Long term goal 5: Pt will improve RUE AROM shoulder abduction (in unsupported sit) to 130 degrees to facilitate independence with recreational activities - progressing, continue    Patient Goals   Patient goals :  \"To use right hand\"    Electronically signed by:  Craig Abad

## 2018-09-19 ENCOUNTER — HOSPITAL ENCOUNTER (OUTPATIENT)
Dept: PHYSICAL THERAPY | Age: 68
Setting detail: THERAPIES SERIES
Discharge: HOME | End: 2018-09-19
Payer: MEDICARE

## 2018-09-19 ENCOUNTER — APPOINTMENT (OUTPATIENT)
Dept: PHYSICAL THERAPY | Age: 68
End: 2018-09-19
Payer: MEDICARE

## 2018-09-19 ENCOUNTER — HOSPITAL ENCOUNTER (OUTPATIENT)
Dept: OCCUPATIONAL THERAPY | Age: 68
Setting detail: THERAPIES SERIES
Discharge: HOME | End: 2018-09-19
Payer: MEDICARE

## 2018-09-19 ENCOUNTER — APPOINTMENT (OUTPATIENT)
Dept: OCCUPATIONAL THERAPY | Age: 68
End: 2018-09-19
Payer: MEDICARE

## 2018-09-19 ENCOUNTER — HOSPITAL ENCOUNTER (OUTPATIENT)
Dept: SPEECH THERAPY | Age: 68
Setting detail: THERAPIES SERIES
Discharge: HOME | End: 2018-09-19
Payer: MEDICARE

## 2018-09-19 PROCEDURE — G9186 MOTOR SPEECH GOAL STATUS: HCPCS

## 2018-09-19 PROCEDURE — 97110 THERAPEUTIC EXERCISES: CPT

## 2018-09-19 PROCEDURE — G8999 MOTOR SPEECH CURRENT STATUS: HCPCS

## 2018-09-19 PROCEDURE — 97127 HC SP THER IVNTJ W/FOCUS COG FUNCJ: CPT

## 2018-09-19 PROCEDURE — G9158 MOTOR SPEECH D/C STATUS: HCPCS

## 2018-09-19 PROCEDURE — 97110 THERAPEUTIC EXERCISES: CPT | Performed by: PHYSICAL THERAPIST

## 2018-09-19 PROCEDURE — 97530 THERAPEUTIC ACTIVITIES: CPT | Performed by: PHYSICAL THERAPIST

## 2018-09-19 PROCEDURE — 97530 THERAPEUTIC ACTIVITIES: CPT

## 2018-09-19 NOTE — FLOWSHEET NOTE
alphabetical order. ( for example, apples, bananas, celery and etc) however, Pt became tearful when acknowledging that this task was\" challenging and that his memory is still not where it should be. \"     Timed Code Treatment Minutes: 20 min therapeutic exercise, 40 minutes therapeutic activity    Total Treatment Minutes:  60 minutes     Treatment/Activity Tolerance:  [x] Patient tolerated treatment well [x] Patient limited by fatigue  [] Patient limited by pain  [] Patient limited by other medical complications  [] Other:     Assessment :Pt is preparing for a vacation and appeared anxious with regards to his abilities for this vacation. It involves a beach , a pool and the ocean. PT discussed how pt should trial things with family present and then wean away for setting situations up for success. Pt was more  Tearful than previously noted expressing his concerns about his present status. Pt is still below baseline and would benefit from continued therapy to maximize potential and increase functional mobility to IND as prior to onset of CVA. Prognosis: [x] Good [] Fair  [] Poor    Patient Requires Follow-up: [x] Yes  [] No    Goals:  Short term goals  Time Frame for Short term goals: 4 weeks  Short term goal 1: Pt to be indep with HEP to max rehab potential. Goal achieved and d/cd  Short term goal 2: Pt to illustrate good to normal sitting balance for ease of transfers and bed mobility skills/ADLS. Goal achieved and d/cd  Short term goal 3: Pt to illustrate improve functional mobility by dec time per TUG < or =13 secs   Short term goal 4: Pt to dec impairment per TEJADA to less than 40%. Goal achieved   Short term goal 5: Pt to dec impairment per Tinetti to less than 40% . Goal achieved   Short term goal 6: Pt to dec impairment per DGI to less than 40% .  Goal NT on this date  Short term goal 7: Pt to improve functional strength in LE evident by improving sit/ 30 seconds from 6 with use of hands to without UE support/use 30 seconds to complete >/=10reps with good descending control and without relying on the back of stabilized chair. Goal modified: Increase LE strength as evidenced by pt moving STS x 10 reps in 30 secs w/ or w/o use of UES  Short term goal 8: Improve involved LE functional strength to >/3+ to4-/5 needed for gait mechanics, ease of heel off/toe off and initial heel strike on the involved LE .  Goal addressed in previous goal.   Long term goals   Time Frame for Long term goals : 8 weeks  Long term goal 1: Pt to be indep with all functional transfers and mobility all surfaces >/=1000ft to return as safe community ambulation   Long term goal 2: Pt to ascend/descend flight of stairs indep for return to prior level and for community/family outtings/gatherings  Long term goal 3: Pt to dec impairment Per TEJADA Tinetti DGY to 0% impairment   Long term goal 4: pt remain fall free at home and in therapy and confidence with ADLs/IADLS mobility in home and community to >/=75%     Plan:   [x] Continue per plan of care [] Alter current plan (see comments)  [] Plan of care initiated [] Hold pending MD visit [] Discharge    Plan for Next Session: Cont with POC      Electronically signed by:  Enrique Casey

## 2018-09-19 NOTE — FLOWSHEET NOTE
Occupational Therapy Daily Treatment Note    Date:  2018    Patient Name:  Ani Freed     :  1950  MRN: [id number]  Restrictions/Precautions: none. Has AFO for RLE   Medical/Treatment Diagnosis Information:  · L ischemic stroke affecting R side (L hand dominant)   · R side hemiparesis   Insurance/Certification information: Medicare    Physician Information: Dr. Scott Melgar of care signed (Y/N):  Y  Visit# / total visits:   Pain level: 0/10     OT G-codes ()  Functional Assessment Tool Used: Box and Blocks  Score: 15  Functional Limitation: Carrying, moving and handling objects  Carrying, Moving and Handling Objects Current Status (): At least 80 percent but less than 100 percent impaired, limited or restricted  Carrying, Moving and Handling Objects Goal Status (): At least 60 percent but less than 80 percent impaired, limited or restricted     Functional Assessment Tool Used: Foster [place]  Score: 26  Functional Limitation: Self care  Self Care Current Status (): At least 20 percent but less than 40 percent impaired, limited or restricted  Self Care Goal Status (): At least 1 percent but less than 20 percent impaired, limited or restricted    OT G-codes ()  Functional Assessment Tool Used: Box and Blocks  Score: 34  Functional Limitation: Carrying, moving and handling objects  Carrying, Moving and Handling Objects Current Status (): At least 60 percent but less than 80 percent impaired, limited or restricted  Carrying, Moving and Handling Objects Goal Status (): At least 60 percent but less than 80 percent impaired, limited or restricted     Functional Assessment Tool Used: Foster [place]  Score: 26  Functional Limitation: Self care  Self Care Current Status (): At least 40 percent but less than 60 percent impaired, limited or restricted  Self Care Goal Status ():  At least 1 percent but less than 20 percent impaired, limited or restricted    Progress Note: []  Yes  [x]  No  Next due by: Visit #20      Subjective: pt denied new issues/complaints     Therapeutic Activities:    Activity #1: Underhand dealing of deck of cards with R hand to work on finger dexterity of digits 3-5. Pt required increase time to complete one set x52 cards. VCs required to inhibit R shoulder hiking with limited success    Activity #2: Pt in stance to grasp/release containers (x8) of various widths to work on R hand opening. Pt required VCs for effective movement patterns. Once containers grasped with R hand, pt transferred containers to L hand to manipulate lids off/on using R hand. Pt used tip to tip pincer grasp to retrieve 3-4 small blocks and place in container. Pt then instructed to place/retrieve containers from overhead shelf using R hand. Therapeutic Exercise:   Exercise/Equipment  Resistance/Repetitions   Other comments   Supine RUE shoulder flexion AROM   1#, x15 reps  1#, x8 reps Min VCs for elbow extension and to maximize ROM     Supine RUE shoulder abduction AROM    1#, x15 reps  1#, x8 reps Min VCs for elbow extension and to maximize ROM     Supine RUE horizontal ab/adduction   1#, x15 reps  1#, x8 reps Min VCs for elbow extension. Completed bilaterally. Supine RUE overhead tricep extension 1#, x15 reps  1#, x8 reps Requires assist for positioning of RUE      Timed Code Treatment Minutes:  55 minutes    Total Treatment Minutes:  55 minutes  40 (TE), 15 (TA)    Assessment     Performance deficits / Impairments: Decreased functional mobility ; Decreased ROM; Decreased strength;Decreased fine motor control;Decreased high-level IADLs;Decreased balance;Decreased coordination    Assessment: Pt progressing well with supine LUE exercises using 1# free weight to complete. Pt noticeably fatigued during 2nd set despite given rest breaks and therefore only completed 8 reps for second set of exercises.  Pt demo'd increase difficulty with functional activities using R hand d/t impaired dexterity and strength of digits 3-5. Pt still functioning below baseline and cont to benefit from skilled OT, cont per POC    Patient Education: progress made towards goals, verb understanding and appeared satisfied. Treatment/Activity Tolerance:     [x]  Patient tolerated treatment well []  Patient limited by fatigue    []  Patient limited by pain []  Patient limited by other medical complications   []  Other:     Prognosis: [x]  Good []  Fair  []  Poor    Patient Requires Follow-up:  [x]  Yes  []  No    Plan: [x]  Continue per plan of care []  Alter current plan (see comments)   []  Plan of care initiated []  Hold pending MD visit []  Discharge    Plan for Next Session:  RUE strengthening in supine position to focus on elbow extension during exercises. Progress to RUE exercises in unsupported sit/stand. Work on RUE fine/gross motor coordination and R hand strengthening, specifically of digits 3-5.  Incorporate dynamic standing balance activities with functional activities of RUE    Goals  Short term goals  Time Frame for Short term goals: 6 weeks   Short term goal 1: Pt will improve score on BBT to 25 blocks in one minute to demonstrate improved functional grasp/release of R hand - goal met 9/14  Short term goal 2: Pt will improve time on NHPT to no more than 2 minutes to demonstrate improved fine motor coordination - goal met 9/14  Short term goal 3: Pt will improve R hand  strength to 30# to facilitate independence with opening tight jars/containers - goal met 9/14  Short term goal 4: Pt will improve RUE AROM shoulder flexion (in unsupported sit) to 115 degrees to facilitate independence with reaching for items overhead - progressing, continue  Short term goal 5: Pt will improve RUE AROM shoulder abduction (in unsupported sit) to 120 degrees to facilitate independence with recreational activities - progressing, continue    Long term goals  Time Frame for Long term goals : 8 weeks  Long term goal 1: Pt will improve score on BBT to 35 blocks in one minute to demonstrate improved functional grasp/release of R hand - progressing, continue  Long term goal 2: Pt will improve time on NHPT to no more than 1 minute and 30 seconds to demonstrate improved fine motor coordination - goal met 9/14  Long term goal 3: Pt will improve R hand  strength to 40# to facilitate independence with opening tight jars/containers - goal met 9/14  Long term goal 4: Pt will improve RUE AROM shoulder flexion (in unsupported sit) to 125 degrees to facilitate independence with reaching for items overhead - progressing, continue  Long term goal 5: Pt will improve RUE AROM shoulder abduction (in unsupported sit) to 130 degrees to facilitate independence with recreational activities - progressing, continue    Patient Goals   Patient goals :  \"To use right hand\"    Electronically signed by:  Mino Newton

## 2018-09-19 NOTE — FLOWSHEET NOTE
of visual information for short term recall. Goal Met - Discharge. 4.  Pt will demonstrate insight to situation, independently. 9/19/18:  Pt reports that he no longer feels that his speech sounds \"like Down's Syndrome. \"  His new complaint is that he sounds like his father did Alma Delia Wilson he was in his 80s. \"  SLP educated pt re: using diaphragmatic breathing strategies to facilitate breath support'; pt verbalized comprehension. 9/17/18:  Pt is able to identify that his speech is intelligible. He demonstrates good insight to his word retrieval difficulty increasing in task-based situations vs conversation. Goal Met - Discharge. 5.  Pt will complete complex reasoning exercises independently. 9/19/18:  SLP educated pt complex code-breaking exercise (MasterMind); pt verbalized comprehension and was able to us deductive reasoning & integrate information with independently increased time. 9/12/18:  Pt was able to complete Perplexor puzzles with minimal assistance - primarily to reduce impulsivity. Goal Met - Discharge. Assessment:   Goals Met. Pt appropriate for and comfortable with discharge from  at this time.       Plan:   Discharge from [address] [place] [address] at this time     Timed Code Treatment: 30 minutes    Total Treatment Time: 30 minutes     Signature:  Ed Webster M.A., [address] [facility]  Phone: [phone]  Fax: [phone]

## 2018-09-21 ENCOUNTER — APPOINTMENT (OUTPATIENT)
Dept: PHYSICAL THERAPY | Age: 68
End: 2018-09-21
Payer: MEDICARE

## 2018-09-21 ENCOUNTER — APPOINTMENT (OUTPATIENT)
Dept: OCCUPATIONAL THERAPY | Age: 68
End: 2018-09-21
Payer: MEDICARE

## 2018-09-24 ENCOUNTER — APPOINTMENT (OUTPATIENT)
Dept: PHYSICAL THERAPY | Age: 68
End: 2018-09-24
Payer: MEDICARE

## 2018-09-24 ENCOUNTER — APPOINTMENT (OUTPATIENT)
Dept: OCCUPATIONAL THERAPY | Age: 68
End: 2018-09-24
Payer: MEDICARE

## 2018-09-26 ENCOUNTER — APPOINTMENT (OUTPATIENT)
Dept: PHYSICAL THERAPY | Age: 68
End: 2018-09-26
Payer: MEDICARE

## 2018-09-26 ENCOUNTER — APPOINTMENT (OUTPATIENT)
Dept: OCCUPATIONAL THERAPY | Age: 68
End: 2018-09-26
Payer: MEDICARE

## 2018-09-28 ENCOUNTER — APPOINTMENT (OUTPATIENT)
Dept: OCCUPATIONAL THERAPY | Age: 68
End: 2018-09-28
Payer: MEDICARE

## 2018-10-01 ENCOUNTER — HOSPITAL ENCOUNTER (OUTPATIENT)
Dept: OCCUPATIONAL THERAPY | Age: 68
Setting detail: THERAPIES SERIES
Discharge: HOME | End: 2018-10-01
Payer: MEDICARE

## 2018-10-01 ENCOUNTER — APPOINTMENT (OUTPATIENT)
Dept: PHYSICAL THERAPY | Age: 68
End: 2018-10-01
Payer: MEDICARE

## 2018-10-01 ENCOUNTER — HOSPITAL ENCOUNTER (OUTPATIENT)
Dept: PHYSICAL THERAPY | Age: 68
Setting detail: THERAPIES SERIES
Discharge: HOME | End: 2018-10-01
Payer: MEDICARE

## 2018-10-01 PROCEDURE — 97112 NEUROMUSCULAR REEDUCATION: CPT

## 2018-10-01 PROCEDURE — 97110 THERAPEUTIC EXERCISES: CPT

## 2018-10-01 PROCEDURE — 97530 THERAPEUTIC ACTIVITIES: CPT

## 2018-10-01 NOTE — FLOWSHEET NOTE
Without weight  In HEP added 9/5/18    Sit to stand from 21 inch height mat, on airex  Without airex w/ R foot posterior X 8 reps       x8 reps   B UE support on R thigh       Gait: SBA without AD or AFO 50 ft x 4 Achieves R heel strike w/ foot slap following resisted hip flex activity. Maintains for 15-20 steps before becomes fatigued and loses heel strike   NMR     SLS //bars No UE support, R LE max 2-3 sec   Resisted R hip flex  Red tband, 10 x 2  B UE support Ankle reactions on foam w/ perturbations next    Alt toe taps Collapsible box, 10 x each No UE support, limited stance on R LE   4 square step 4 x 3 ccw and cw Added counting dual task, progressed to raised square vs. Flat square   Mult direction stepping, figure 8's Next     Supine UE/LE lifts w/ dual task Next       HEP: 9/19/18:  1. 1/2 bridge without use of the AFO. The LLE is in figure \"4\" on the R LE. Pt instructed to do the ex to both LES. A bridge is performed in which ankle PF/ DF / and neutral are performed  with the supporting LE resulting in a slow count of \"3\" for  increasing the R ankle strategies. Yulisa 5 reps     2. PNF pattern with R  LE off of the table and crossing over the opposing LE(focus on leading with the toes)   3. Prone position: alternating hamstring curls with focus on DF when performing   Pt demo 5 reps of each ex to demo a clear understanding of the ex( Pt is now yulisa 3/4 range with the RLE with  this ex)    Timed Code Treatment Minutes: 10 TE, 50 NM    Total Treatment Minutes:  60 minutes     Treatment/Activity Tolerance:  [x] Patient tolerated treatment well [x] Patient limited by fatigue  [] Patient limited by pain  [] Patient limited by other medical complications  [] Other:     Assessment:  Pt demonstrated challenged by, but good tolerance of Nustep w/ R sided focus.  Use of neuromuscular re-educ technique for increased R dorsiflexion w/ resisted hip flexion was successful in carrying over to improved R foot clearance during gait immediately following the activity. Pt remains limited by R SLS and impaired balance reactions due to sensory deficits in R distal LE. Pt is still below baseline and would benefit from continued therapy to maximize potential and increase functional mobility to IND as prior to onset of CVA. Prognosis: [x] Good [] Fair  [] Poor    Patient Requires Follow-up: [x] Yes  [] No    Goals:  Short term goals  Time Frame for Short term goals: 4 weeks  Short term goal 1: Pt to be indep with HEP to max rehab potential. Goal achieved and d/cd  Short term goal 2: Pt to illustrate good to normal sitting balance for ease of transfers and bed mobility skills/ADLS. Goal achieved and d/cd  Short term goal 3: Pt to illustrate improve functional mobility by dec time per TUG < or =13 secs   Short term goal 4: Pt to dec impairment per TEJADA to less than 40%. Goal achieved   Short term goal 5: Pt to dec impairment per Tinetti to less than 40% . Goal achieved   Short term goal 6: Pt to dec impairment per DGI to less than 40% . Goal NT on this date  Short term goal 7: Pt to improve functional strength in LE evident by improving sit/ 30 seconds from 6 with use of hands to without UE support/use 30 seconds to complete >/=10reps with good descending control and without relying on the back of stabilized chair. Goal modified: Increase LE strength as evidenced by pt moving STS x 10 reps in 30 secs w/ or w/o use of UES  Short term goal 8: Improve involved LE functional strength to >/3+ to4-/5 needed for gait mechanics, ease of heel off/toe off and initial heel strike on the involved LE .  Goal addressed in previous goal.     Long term goals   Time Frame for Long term goals : 8 weeks  Long term goal 1: Pt to be indep with all functional transfers and mobility all surfaces >/=1000ft to return as safe community ambulation   Long term goal 2: Pt to ascend/descend flight of stairs indep for return to prior level and for community/family outtings/gatherings  Long term goal 3: Pt to dec impairment Per TEJADA Tinetti DGY to 0% impairment   Long term goal 4: pt remain fall free at home and in therapy and confidence with ADLs/IADLS mobility in home and community to >/=75%     Plan:   [x] Continue per plan of care [] Alter current plan (see comments)  [] Plan of care initiated [] Hold pending MD visit [] Discharge    Plan for Next Session: Progress neuromuscular re-educ for R hip, ankle motor control. Trial R single leg squats and supine SB coordination activities.  Cont with POC      Electronically signed by:  Adarsh Mao DPT

## 2018-10-03 ENCOUNTER — HOSPITAL ENCOUNTER (OUTPATIENT)
Dept: PHYSICAL THERAPY | Age: 68
Setting detail: THERAPIES SERIES
Discharge: HOME | End: 2018-10-03
Payer: MEDICARE

## 2018-10-03 ENCOUNTER — APPOINTMENT (OUTPATIENT)
Dept: PHYSICAL THERAPY | Age: 68
End: 2018-10-03
Payer: MEDICARE

## 2018-10-03 ENCOUNTER — HOSPITAL ENCOUNTER (OUTPATIENT)
Dept: OCCUPATIONAL THERAPY | Age: 68
Setting detail: THERAPIES SERIES
Discharge: HOME | End: 2018-10-03
Payer: MEDICARE

## 2018-10-03 PROCEDURE — 97110 THERAPEUTIC EXERCISES: CPT

## 2018-10-03 PROCEDURE — 97530 THERAPEUTIC ACTIVITIES: CPT

## 2018-10-03 PROCEDURE — 97116 GAIT TRAINING THERAPY: CPT

## 2018-10-03 PROCEDURE — 97112 NEUROMUSCULAR REEDUCATION: CPT

## 2018-10-03 NOTE — FLOWSHEET NOTE
Physical Therapy Daily Treatment Note  Date:  10/3/2018    Patient Name:  Henrry Blanchard    :  1950  MRN: [id number]  Restrictions/Precautions:  Diabetes, impulsive, right ankle and knee pre-existing conditions, wears glasses, R hip arthritis  Medical/Treatment Diagnosis Information:  · Diagnosis: CVA I63.40  · Treatment Diagnosis: balance problems 2/2 CVA  Insurance/Certification information:  PT Insurance Information: Medicare  Physician Information:  Referring Practitioner: Dr. Jf Sanches MD Follow-up: Not known  Plan of care signed (Y/N):  Y  Visit# / total visits: 15/24  Pain level: 0/10    G-Code noted on 2018:   PT G-Codes  Functional Assessment Tool Used: Tinetti  Score:   Functional Limitation: Mobility: Walking and moving around  Mobility: Walking and Moving Around Current Status (): At least 80 percent but less than 100 percent impaired, limited or restricted  Mobility: Walking and Moving Around Goal Status (): 0 percent impaired, limited or restricted    Progress Note: []  Yes  [x]  No  Next due by: Visit #20 or 10/12/18    Subjective:  Pt has no new complaints today. C/o of not feeling stable or safe walking without the AFO and does not feel that he will ever be comfortable without it. Objective:  Pt continues with yogesh-gait pattern w/ decreased anjana, B LE ext rotated and requires SPC and R AFO. Resting HR: 55bpm    Exercises: Session without AFO  Exercise/Equipment Resistance/Repetitions Other comments   Nustep  Workload 4, 2 minutes  Spm 100bpm, 2 min x 2  Spm 70 spm, 1 min x 2   Increased effort to R side     Max HR 66bpm   Incline stretch  30 sec x 3    Eccentric gastroc on edge of step  5 x 3 Guarding at lateral ankle to prevent excessive eversion   HEP added 18   Without weight  In HEP added 18    Gait: SBA without AD or AFO 50 ft x 4 Achieves R heel strike w/ foot slap following resisted hip flex activity.  Maintains for 15-20 steps before ankle motor control. Trial supine SB coordination activities.    Electronically signed by:  Lina Beard DPT

## 2018-10-03 NOTE — FLOWSHEET NOTE
Occupational Therapy Daily Treatment Note    Date:  10/3/2018    Patient Name:  Dilma Segura     :  1950  MRN: [id number]  Restrictions/Precautions: none. Has AFO for RLE   Medical/Treatment Diagnosis Information:  · L ischemic stroke affecting R side (L hand dominant)   · R side hemiparesis   Insurance/Certification information: Medicare    Physician Information: Dr. Carmina Wu of care signed (Y/N):  Y  Visit# / total visits: 15 / 24  Pain level: 0/10     OT G-codes ()  Functional Assessment Tool Used: Box and Blocks  Score: 15  Functional Limitation: Carrying, moving and handling objects  Carrying, Moving and Handling Objects Current Status (): At least 80 percent but less than 100 percent impaired, limited or restricted  Carrying, Moving and Handling Objects Goal Status (): At least 60 percent but less than 80 percent impaired, limited or restricted     Functional Assessment Tool Used: Kala Sabillon  Score: 26  Functional Limitation: Self care  Self Care Current Status (): At least 20 percent but less than 40 percent impaired, limited or restricted  Self Care Goal Status (): At least 1 percent but less than 20 percent impaired, limited or restricted    OT G-codes ()  Functional Assessment Tool Used: Box and Blocks  Score: 34  Functional Limitation: Carrying, moving and handling objects  Carrying, Moving and Handling Objects Current Status (): At least 60 percent but less than 80 percent impaired, limited or restricted  Carrying, Moving and Handling Objects Goal Status (): At least 60 percent but less than 80 percent impaired, limited or restricted     Functional Assessment Tool Used: Quick Dash  ()  Score: 26  Functional Limitation: Self care  Self Care Current Status (): At least 40 percent but less than 60 percent impaired, limited or restricted  Self Care Goal Status ():  At least 1 percent but less than 20 percent impaired, limited or balance;Decreased coordination    Assessment: Pt progressing well with RUE exercises completing one set in unsupported sit with intermittent tactile and verbal cues. Pt demo'd increase difficulty with maintaining elbow extension d/t impaired strength of triceps. Pt required increase time to complete functional activities to work on R in-hand manipulation d/t impaired digit dexterity. Pt still functioning below baseline and cont to benefit from skilled OT, cont per POC    Patient Education: importance of maintaining good posture during therapeutic exercises/activities. Treatment/Activity Tolerance:     [x]  Patient tolerated treatment well []  Patient limited by fatigue    []  Patient limited by pain []  Patient limited by other medical complications   []  Other:     Prognosis: [x]  Good []  Fair  []  Poor    Patient Requires Follow-up:  [x]  Yes  []  No    Plan: [x]  Continue per plan of care []  Alter current plan (see comments)   []  Plan of care initiated []  Hold pending MD visit []  Discharge    Plan for Next Session:  RUE strengthening in supine position to focus on elbow extension during exercises. Progress to RUE exercises in unsupported sit/stand. Work on RUE fine/gross motor coordination and R hand strengthening, specifically of digits 3-5.  Incorporate dynamic standing balance activities with functional activities of RUE    Goals  Short term goals  Time Frame for Short term goals: 6 weeks   Short term goal 1: Pt will improve score on BBT to 25 blocks in one minute to demonstrate improved functional grasp/release of R hand - goal met 9/14  Short term goal 2: Pt will improve time on NHPT to no more than 2 minutes to demonstrate improved fine motor coordination - goal met 9/14  Short term goal 3: Pt will improve R hand  strength to 30# to facilitate independence with opening tight jars/containers - goal met 9/14  Short term goal 4: Pt will improve RUE AROM shoulder flexion (in unsupported sit) to 115

## 2018-10-05 ENCOUNTER — HOSPITAL ENCOUNTER (OUTPATIENT)
Dept: OCCUPATIONAL THERAPY | Age: 68
Setting detail: THERAPIES SERIES
Discharge: HOME | End: 2018-10-05
Payer: MEDICARE

## 2018-10-05 ENCOUNTER — APPOINTMENT (OUTPATIENT)
Dept: PHYSICAL THERAPY | Age: 68
End: 2018-10-05
Payer: MEDICARE

## 2018-10-05 PROCEDURE — 97110 THERAPEUTIC EXERCISES: CPT

## 2018-10-05 PROCEDURE — 97530 THERAPEUTIC ACTIVITIES: CPT

## 2018-10-08 ENCOUNTER — HOSPITAL ENCOUNTER (OUTPATIENT)
Dept: PHYSICAL THERAPY | Age: 68
Setting detail: THERAPIES SERIES
Discharge: HOME | End: 2018-10-08
Payer: MEDICARE

## 2018-10-08 ENCOUNTER — APPOINTMENT (OUTPATIENT)
Dept: PHYSICAL THERAPY | Age: 68
End: 2018-10-08
Payer: MEDICARE

## 2018-10-08 ENCOUNTER — HOSPITAL ENCOUNTER (OUTPATIENT)
Dept: OCCUPATIONAL THERAPY | Age: 68
Setting detail: THERAPIES SERIES
Discharge: HOME | End: 2018-10-08
Payer: MEDICARE

## 2018-10-08 PROCEDURE — 97112 NEUROMUSCULAR REEDUCATION: CPT

## 2018-10-08 PROCEDURE — 97110 THERAPEUTIC EXERCISES: CPT

## 2018-10-08 PROCEDURE — 97116 GAIT TRAINING THERAPY: CPT

## 2018-10-08 PROCEDURE — 97530 THERAPEUTIC ACTIVITIES: CPT

## 2018-10-08 NOTE — FLOWSHEET NOTE
of falls recently, recommended avoiding forward leaning to pick things up. Prognosis: [x] Good [] Fair  [] Poor    Patient Requires Follow-up: [x] Yes  [] No    Goals:  Short term goals  Time Frame for Short term goals: 4 weeks  Short term goal 1: Pt to be indep with HEP to max rehab potential. Goal achieved and d/cd  Short term goal 2: Pt to illustrate good to normal sitting balance for ease of transfers and bed mobility skills/ADLS. Goal achieved and d/cd  Short term goal 3: Pt to illustrate improve functional mobility by dec time per TUG < or =13 secs   Short term goal 4: Pt to dec impairment per TEJADA to less than 40%. Goal achieved   Short term goal 5: Pt to dec impairment per Tinetti to less than 40% . Goal achieved   Short term goal 6: Pt to dec impairment per DGI to less than 40% . Goal NT on this date  Short term goal 7: Pt to improve functional strength in LE evident by improving sit/ 30 seconds from 6 with use of hands to without UE support/use 30 seconds to complete >/=10reps with good descending control and without relying on the back of stabilized chair. Goal modified: Increase LE strength as evidenced by pt moving STS x 10 reps in 30 secs w/ or w/o use of UES  Short term goal 8: Improve involved LE functional strength to >/3+ to4-/5 needed for gait mechanics, ease of heel off/toe off and initial heel strike on the involved LE .  Goal addressed in previous goal.     Long term goals   Time Frame for Long term goals : 8 weeks  Long term goal 1: Pt to be indep with all functional transfers and mobility all surfaces >/=1000ft to return as safe community ambulation   Long term goal 2: Pt to ascend/descend flight of stairs indep for return to prior level and for community/family outtings/gatherings  Long term goal 3: Pt to dec impairment Per TEJADA Tinetti DGY to 0% impairment   Long term goal 4: pt remain fall free at home and in therapy and confidence with ADLs/IADLS mobility in home and

## 2018-10-10 ENCOUNTER — HOSPITAL ENCOUNTER (OUTPATIENT)
Dept: PHYSICAL THERAPY | Age: 68
Setting detail: THERAPIES SERIES
Discharge: HOME | End: 2018-10-10
Payer: MEDICARE

## 2018-10-10 ENCOUNTER — HOSPITAL ENCOUNTER (OUTPATIENT)
Dept: OCCUPATIONAL THERAPY | Age: 68
Setting detail: THERAPIES SERIES
Discharge: HOME | End: 2018-10-10
Payer: MEDICARE

## 2018-10-10 ENCOUNTER — APPOINTMENT (OUTPATIENT)
Dept: OCCUPATIONAL THERAPY | Age: 68
End: 2018-10-10
Payer: MEDICARE

## 2018-10-10 PROCEDURE — G8979 MOBILITY GOAL STATUS: HCPCS

## 2018-10-10 PROCEDURE — G8978 MOBILITY CURRENT STATUS: HCPCS

## 2018-10-10 PROCEDURE — 97112 NEUROMUSCULAR REEDUCATION: CPT

## 2018-10-10 PROCEDURE — 97530 THERAPEUTIC ACTIVITIES: CPT

## 2018-10-10 PROCEDURE — 97110 THERAPEUTIC EXERCISES: CPT

## 2018-10-10 NOTE — FLOWSHEET NOTE
restricted    Progress Note: []  Yes  [x]  No  Next due by: Visit #20  (or 10/14)    Subjective: Pt reported satisfaction with increase ability to touch back of head with RUE without pain    Therapeutic Activities:    Activity #1: Underhand dealing of deck of cards with R hand to work on finger dexterity of digits 3-5. Pt required increase time to complete one set x52 cards (>4 minutes). Pt attempted 2nd trial with multiple errors and inability to complete despite tactile/verbal cues d/t hand fatigue, specifically with digits 4 and 5. VCs required to inhibit R shoulder hiking with limited success    Therapeutic Exercise:   Exercise/Equipment  Resistance/Repetitions   Other comments   Supine RUE shoulder flexion AROM     2 sets x 15 reps with 1.5# cuff weight SBA   Supine RUE shoulder abduction AROM      2 sets x 15 reps with 1.5# cuff weight SBA   Supine RUE horizontal ab/adduction     2 sets x 15 reps with 1.5# cuff weight Completed bilaterally   Supine RUE overhead tricep extension   2 sets x 15 reps with 1.5# cuff weight SBA     Edge of mat RUE shoulder flexion AROM 10 reps Pt required tactile and verbal cues for effective movement patterns. Mirror used for [employer] of mat RUE shoulder horizontal ab/adduction AROM 8 reps Required min A + mod - max verbal cues to facilitate effective movement patterns. Significant fatigue in RUE noted. Decreased AROM noted at times d/t fatigue       Timed Code Treatment Minutes:  55 minutes    Total Treatment Minutes:  55 minutes  15 (TA), 40 (TE)    Assessment     Performance deficits / Impairments: Decreased functional mobility ; Decreased ROM; Decreased strength;Decreased fine motor control;Decreased high-level IADLs;Decreased balance;Decreased coordination    Assessment: Pt progressing to 2 sets of supine RUE exercises demo'ing improved strength with full back support.  Pt demo'd increase difficulty with RUE exercises in unsupported sit d/t upright positioning,

## 2018-10-10 NOTE — FLOWSHEET NOTE
involved LE functional strength to >/3+ to4-/5 needed for gait mechanics, ease of heel off/toe off and initial heel strike on the involved LE . Progressing     Long term goals    Time Frame for Long term goals : 8 weeks  Long term goal 1: Pt to be indep with all functional transfers and mobility all surfaces >/=1000ft to return as safe community ambulation. Progressing  Long term goal 2: Pt to ascend/descend flight of stairs indep for return to prior level and for community/family outtings/gatherings. [de-identified]  Long term goal 3: Pt to dec impairment Per TEJADA Tinetti DGY to 0% impairment Progressing   Long term goal 4: pt remain fall free at home and in therapy and confidence with ADLs/IADLS mobility in home and community to >/=75%. Progressing     Plan:   [x] Continue per plan of care [] Alter current plan (see comments)  [] Plan of care initiated [] Hold pending MD visit [] Discharge    Plan for Next Session: Progress neuromuscular re-educ for R hip- supine SB exer, ankle motor control. Return to dual tasks. Progress stair negotiation.       Electronically signed by:  Michi Alcaraz DPT

## 2018-10-10 NOTE — PLAN OF CARE
Outpatient Physical Therapy  Phone: [phone] Fax: [phone]    To: Dr. Tanvi Muller    From: Lu Harkins, PT , DPT  Date: 10/10/2018  Patient: Lolita Blanco     : 1950 MRN: [id number]  Medical Diagnosis:  Diagnosis: CVA I63.40  Treatment Diagnosis: balance problems 2/2 CVA     Insurance/Certification information:  PT Insurance Information: Medicare  Physician Information:  Referring Practitioner: Dr. Ruddy Shen MD Follow-up: Not known  Plan of care signed (Y/N):  Y  Visit# / total visits:    Pain level:      0/10          G-Code noted on 10/10/2018:   PT G-Codes  Functional Assessment Tool Used: Tinetti (), Vincent (44/56), TUG w/ AFO and SPC- 17 sec  Functional Limitation: Mobility: Walking and moving around  Mobility: Walking and Moving Around Current Status (): At least 80 percent but less than 100 percent impaired, limited or restricted  Mobility: Walking and Moving Around Goal Status (): 0 percent impaired, limited or restricted     G-Code noted on 2018:   PT G-Codes  Functional Assessment Tool Used: Tinetti  Score: 24  Functional Limitation: Mobility: Walking and moving around  Mobility: Walking and Moving Around Current Status (): At least 80 percent but less than 100 percent impaired, limited or restricted  Mobility: Walking and Moving Around Goal Status (): 0 percent impaired, limited or restricted       Physical Therapy Certification/Re-Certification Form  Dear Dr. Tanvi Muller  The following patient has been evaluated for physical therapy services and for therapy to continue, Medicare requires monthly physician review of the treatment plan. Please review the attached evaluation and/or summary of the patient's plan of care, and verify that you agree therapy should continue by signing the attached document and sending it back to our office.     Plan of Care/Treatment to date:  [x] Therapeutic Exercise (Review/Progress HEP and provide verbal/tactile cueing and in therapy and confidence with ADLs/IADLS mobility in home and community to >/=75%. Progressing        Frequency/Duration:  # Days per week: [] 1 day # Weeks: [] 1 week [] 5 weeks     [x] 2 days? [] 2 weeks [] 6 weeks     [] 3 days   [] 3 weeks [] 7 weeks     [] 4 days   [x] 4 weeks [] 8 weeks    Rehab Potential: [x] Excellent [] Good [] Fair  [] Poor       Electronically signed by:  Sonja Horner, PT, DPT      If you have any questions or concerns, please don't hesitate to call.   Thank you for your referral.      Physician Signature:________________________________Date:__________________  By signing above, therapists plan is approved by physician

## 2018-10-12 ENCOUNTER — HOSPITAL ENCOUNTER (OUTPATIENT)
Dept: OCCUPATIONAL THERAPY | Age: 68
Setting detail: THERAPIES SERIES
Discharge: HOME | End: 2018-10-12
Payer: MEDICARE

## 2018-10-12 ENCOUNTER — APPOINTMENT (OUTPATIENT)
Dept: PHYSICAL THERAPY | Age: 68
End: 2018-10-12
Payer: MEDICARE

## 2018-10-12 PROCEDURE — 97110 THERAPEUTIC EXERCISES: CPT

## 2018-10-12 PROCEDURE — 97530 THERAPEUTIC ACTIVITIES: CPT

## 2018-10-12 PROCEDURE — G8984 CARRY CURRENT STATUS: HCPCS

## 2018-10-12 PROCEDURE — G8985 CARRY GOAL STATUS: HCPCS

## 2018-10-12 PROCEDURE — G8988 SELF CARE GOAL STATUS: HCPCS

## 2018-10-12 PROCEDURE — G8987 SELF CARE CURRENT STATUS: HCPCS

## 2018-10-12 NOTE — PROGRESS NOTES
20 coins using tip to tip pincer grasp and store in palm of hand. Pt then instructed to translate each coin back to tip to tip pincer grasp and place each coin through slot of container. Pt required increase time with min errors noted. Therapeutic Exercise:   Exercise/Equipment  Resistance/Repetitions   Other comments   Supine RUE shoulder flexion AROM     x 15 reps with 1.5# cuff weight SBA   Supine RUE shoulder abduction AROM      x 15 reps with 1.5# cuff weight SBA   Supine RUE horizontal ab/adduction     x 15 reps with 1.5# cuff weight Completed bilaterally   Supine RUE overhead tricep extension   x 15 reps with 1.5# cuff weight SBA     Edge of mat RUE shoulder flexion AROM 10 reps Pt required tactile and verbal cues for effective movement patterns; pt specifically benefits from facilitation for scapulo-humeral movement of RUE. Mirror used for visual feedback     Edge of mat RUE shoulder abduction AROM 10 reps Pt required tactile and verbal cues for effective movement patterns; pt specifically benefits from facilitation for scapulo-humeral movement of RUE. Mirror used for [employer] of mat RUE shoulder horizontal ab/adduction AROM 10 reps Required tapping facilitation to maintain elbow extension, specifically with horizontal abduction versus adduction. Significant fatigue in RUE noted. Timed Code Treatment Minutes:  58 minutes    Total Treatment Minutes:  58 minutes  20 (TE), 38 (TA)    Assessment     Performance deficits / Impairments: Decreased functional mobility ; Decreased ROM; Decreased strength;Decreased fine motor control;Decreased high-level IADLs;Decreased balance;Decreased coordination    Assessment: Pt continue to demo progress towards goals and regaining function in RUE. Pt cont to be limited by decrease RUE strength which impacts AROM, specifically with shoulder flexion.  Pt cont to demo decreased timing, speed and coordination of R hand d/t decrease strength and impaired to facilitate independence with recreational activities - goal met 10/12    Long term goals  Time Frame for Long term goals : 8 weeks  Long term goal 1: Pt will improve score on BBT to 35 blocks in one minute to demonstrate improved functional grasp/release of R hand - progressing, continue  Long term goal 2: Pt will improve time on NHPT to no more than 1 minute and 30 seconds to demonstrate improved fine motor coordination - goal met 9/14  Long term goal 3: Pt will improve R hand  strength to 40# to facilitate independence with opening tight jars/containers - goal met 9/14  Long term goal 4: Pt will improve RUE AROM shoulder flexion (in unsupported sit) to 125 degrees to facilitate independence with reaching for items overhead - progressing, continue  Long term goal 5: Pt will improve RUE AROM shoulder abduction (in unsupported sit) to 130 degrees to facilitate independence with recreational activities - goal met 10/12    Patient Goals   Patient goals :  \"To use right hand\"    Electronically signed by:  Diego Murcia

## 2018-10-15 ENCOUNTER — APPOINTMENT (OUTPATIENT)
Dept: OCCUPATIONAL THERAPY | Age: 68
End: 2018-10-15
Payer: MEDICARE

## 2018-10-15 ENCOUNTER — HOSPITAL ENCOUNTER (OUTPATIENT)
Dept: OCCUPATIONAL THERAPY | Age: 68
Setting detail: THERAPIES SERIES
Discharge: HOME | End: 2018-10-15
Payer: MEDICARE

## 2018-10-15 ENCOUNTER — APPOINTMENT (OUTPATIENT)
Dept: PHYSICAL THERAPY | Age: 68
End: 2018-10-15
Payer: MEDICARE

## 2018-10-15 ENCOUNTER — HOSPITAL ENCOUNTER (OUTPATIENT)
Dept: PHYSICAL THERAPY | Age: 68
Setting detail: THERAPIES SERIES
Discharge: HOME | End: 2018-10-15
Payer: MEDICARE

## 2018-10-15 PROCEDURE — 97110 THERAPEUTIC EXERCISES: CPT

## 2018-10-15 PROCEDURE — 97112 NEUROMUSCULAR REEDUCATION: CPT

## 2018-10-15 NOTE — FLOWSHEET NOTE
benefits from facilitation for scapulo-humeral movement of RUE. Mirror used for visual feedback. Cues also needed for effective posturing      Edge of mat RUE shoulder abduction AROM 10 reps Pt required tactile and verbal cues for effective movement patterns, including maintaining elbow extension; pt specifically benefits from facilitation for scapulo-humeral movement of RUE. Mirror used for visual feedback. Cues also needed for effective posturing      Edge of mat RUE shoulder horizontal ab/adduction AROM  Attempted, unable to completed d/t fatigue   R 5th digit extension 5 minutes with e-stim assist  10 reps without e-stim assist Focused on 5th digit extension. Pt demo'd good carryover for 9/10 reps without e-stim requiring assist with 10th rep d/t fatigue       Modalities:  R Wrist/Finger extensors attempting to focus on EDM: VMS burst 50 bps, 5/5 cycle with . 5 second ramp. Amplitude 19.5 mA CC. Timed Code Treatment Minutes:  60 minutes    Total Treatment Minutes:  60 minutes  5 (NMR), 55 (TE)    Assessment     Performance deficits / Impairments: Decreased functional mobility ; Decreased ROM; Decreased strength;Decreased fine motor control;Decreased high-level IADLs;Decreased balance;Decreased coordination    Assessment: Pt requested to completed 2 sets of supine RUE exercises this date d/t feelings of increase weakness. Pt cont to require additional verbal and tactile cues when completing RUE exercises seated edge of mat d/t ineffective movement and posture patterns as a result of habitual movement patterns and weakness. Pt demo'd good response to e-stim to strengthen 5th digit extensor fatiguing after 5 minutes/10 reps.   Pt remains below baseline and cont to benefit from skilled OT to maximize independence, cont per POC    Patient Education: progress made towards goals, verb understanding    Treatment/Activity Tolerance:     [x]  Patient tolerated treatment well []  Patient limited by fatigue    []  Patient limited by pain []  Patient limited by other medical complications   []  Other:     Prognosis: [x]  Good []  Fair  []  Poor    Patient Requires Follow-up:  [x]  Yes  []  No    Plan: [x]  Continue per plan of care []  Alter current plan (see comments)   []  Plan of care initiated []  Hold pending MD visit []  Discharge    Plan for Next Session:  RUE strengthening in supine position to focus on elbow extension during exercises. Progress to RUE exercises in unsupported sit/stand. Work on RUE fine/gross motor coordination and R hand strengthening, specifically of digits 3-5. Incorporate dynamic standing balance activities with functional activities of RUE.  Trial e-stim to R 5th digit extensor as an adjunctive treatment intervention for strengthening during functional task practice    Goals  Short term goals  Time Frame for Short term goals: 6 weeks   Short term goal 1: Pt will improve score on BBT to 25 blocks in one minute to demonstrate improved functional grasp/release of R hand - goal met 9/14  Short term goal 2: Pt will improve time on NHPT to no more than 2 minutes to demonstrate improved fine motor coordination - goal met 9/14  Short term goal 3: Pt will improve R hand  strength to 30# to facilitate independence with opening tight jars/containers - goal met 9/14  Short term goal 4: Pt will improve RUE AROM shoulder flexion (in unsupported sit) to 115 degrees to facilitate independence with reaching for items overhead - progressing, continue   Short term goal 5: Pt will improve RUE AROM shoulder abduction (in unsupported sit) to 120 degrees to facilitate independence with recreational activities - goal met 10/12    Long term goals  Time Frame for Long term goals : 8 weeks  Long term goal 1: Pt will improve score on BBT to 35 blocks in one minute to demonstrate improved functional grasp/release of R hand - progressing, continue  Long term goal 2: Pt will improve time on NHPT to no more than 1 minute and 30

## 2018-10-17 ENCOUNTER — APPOINTMENT (OUTPATIENT)
Dept: PHYSICAL THERAPY | Age: 68
End: 2018-10-17
Payer: MEDICARE

## 2018-10-17 ENCOUNTER — HOSPITAL ENCOUNTER (OUTPATIENT)
Dept: PHYSICAL THERAPY | Age: 68
Setting detail: THERAPIES SERIES
Discharge: HOME | End: 2018-10-17
Payer: MEDICARE

## 2018-10-17 ENCOUNTER — HOSPITAL ENCOUNTER (OUTPATIENT)
Dept: OCCUPATIONAL THERAPY | Age: 68
Setting detail: THERAPIES SERIES
Discharge: HOME | End: 2018-10-17
Payer: MEDICARE

## 2018-10-17 PROCEDURE — 97110 THERAPEUTIC EXERCISES: CPT

## 2018-10-17 PROCEDURE — 97530 THERAPEUTIC ACTIVITIES: CPT

## 2018-10-17 PROCEDURE — 97112 NEUROMUSCULAR REEDUCATION: CPT

## 2018-10-17 NOTE — FLOWSHEET NOTE
without AFO  Exercise/Equipment Resistance/Repetitions Other comments   HEP added 9/5/18   Without weight  In HEP added 9/5/18    NMR     nextR KTC, abd/add, lower trunk rotation w/ SB in supine 10 x 1, 2 for KTC    Ankle DF 6 minutes Active assisted w/ NMES applied to R ant tib in supine over bolster   Mult direction stepping 10 min. Forward, backward, lateral stepping foam roll  W/ use of NMES during swing phase   Gait: SBA with SC, without AFO;  CGA without AD or AFO 50 ft x 6 Achieves R heel strike w/ foot slap following NMES. Education: Pt educ on 3 aspects of normal balance systems and the deficits he is experiencing in proprioception and sensation that are significantly impacting his balance as well as his delayed balance reactions now from his CVA which are causing him to be a fall risk. Recommended that pt use his RW when amb at home due to his impulsivity and need to move quickly without ability to clear his R foot sufficiently, especially if no wearing R AFO. Pt somewhat receptive. Modalities: estim, VMS, to R anterior tib, total 24 minutes during exerc and stepping activity. 50Hz, 200us, on time 5/off time 5 sec. Intensity started at 34 increased to 50 during WB activity. HEP: 9/19/18:  1. 1/2 bridge without use of the AFO. The LLE is in figure \"4\" on the R LE. Pt instructed to do the ex to both LES. A bridge is performed in which ankle PF/ DF / and neutral are performed  with the supporting LE resulting in a slow count of \"3\" for  increasing the R ankle strategies. Yulisa 5 reps     2. PNF pattern with R  LE off of the table and crossing over the opposing LE(focus on leading with the toes)   3.  Prone position: alternating hamstring curls with focus on DF when performing   Pt demo 5 reps of each ex to demo a clear understanding of the ex( Pt is now yulisa 3/4 range with the RLE with  this ex)    Timed Code Treatment Minutes: NM 55    Total Treatment Minutes:  55 minutes     Treatment/Activity Tolerance:  [x] Patient tolerated treatment well [] Patient limited by fatigue  [] Patient limited by pain  [] Patient limited by other medical complications  [] Other:     Assessment: Pt demo'd good response w/ NMES to R ant tib during exercise and during stepping activity, although somewhat limited during lateral stepping task by decreased hip flexor and abductor initiation. Pt tearful at the start of session when discussing recent falls, recommendations for better safety awareness, consideration of using RW vs SC. Pt somewhat in denial of severity of his fall risk and ability to rationalize a safer plan for his mobility. Will continue to benefit from further education and assessment of balance. Pt in agreement that he may benefit from talking to neuropsychologist to assist in coping w/ this change in function, life style and relationship w/ wife. Will call PCP office to request neuropsych eval. Pt will continue to benefit from skilled PT to progress strength, balance and functional mobility to reduce fall risk and return to as close to prior level of function as possible. Prognosis: [x] Good [] Fair  [] Poor    Patient Requires Follow-up: [x] Yes  [] No    Goals:  Short term goals  Time Frame for Short term goals: 4 weeks  Short term goal 1: Pt to be indep with HEP to max rehab potential. Goal achieved and d/cd  Short term goal 2: Pt to illustrate good to normal sitting balance for ease of transfers and bed mobility skills/ADLS. Goal achieved and d/cd  Short term goal 3: Pt to illustrate improve functional mobility by dec time per TUG < or =13 secs Progressing, 17 sec   Short term goal 4: Pt to dec impairment per TEJADA to less than 40%. Goal achieved   Short term goal 5: Pt to dec impairment per Tinetti to less than 40% . Goal achieved   Short term goal 6: Pt to dec impairment per DGI to less than 40% .  Goal NT on this date  Short term goal 7: Pt to improve functional strength in LE evident by improving

## 2018-10-19 ENCOUNTER — APPOINTMENT (OUTPATIENT)
Dept: OCCUPATIONAL THERAPY | Age: 68
End: 2018-10-19
Payer: MEDICARE

## 2018-10-19 ENCOUNTER — APPOINTMENT (OUTPATIENT)
Dept: PHYSICAL THERAPY | Age: 68
End: 2018-10-19
Payer: MEDICARE

## 2018-10-22 ENCOUNTER — HOSPITAL ENCOUNTER (OUTPATIENT)
Dept: OCCUPATIONAL THERAPY | Age: 68
Setting detail: THERAPIES SERIES
Discharge: HOME | End: 2018-10-22
Payer: MEDICARE

## 2018-10-22 ENCOUNTER — HOSPITAL ENCOUNTER (OUTPATIENT)
Dept: PHYSICAL THERAPY | Age: 68
Setting detail: THERAPIES SERIES
Discharge: HOME | End: 2018-10-22
Payer: MEDICARE

## 2018-10-22 PROCEDURE — 97110 THERAPEUTIC EXERCISES: CPT

## 2018-10-22 PROCEDURE — 97112 NEUROMUSCULAR REEDUCATION: CPT

## 2018-10-22 PROCEDURE — 97530 THERAPEUTIC ACTIVITIES: CPT

## 2018-10-22 NOTE — FLOWSHEET NOTE
Physical Therapy Daily Treatment Note  Date:  10/22/2018    Patient Name:  Catalina Reardon    :  1950  MRN: [id number]  Restrictions/Precautions:  Diabetes, impulsive, right ankle and knee pre-existing conditions, wears glasses, R hip arthritis  Medical/Treatment Diagnosis Information:  · Diagnosis: CVA I63.40  · Treatment Diagnosis: balance problems 2/2 CVA  Insurance/Certification information:  PT Insurance Information: Medicare  Physician Information:  Referring Practitioner: Dr. Yoel Decker MD Follow-up: Not known  Plan of care signed (Y/N):  Y  Visit# / total visits:   Pain level: 0/10      G-Code noted on 10/10/2018:   PT G-Codes  Functional Assessment Tool Used: Tinetti, Vincent, TUG (w/ AFO and SPC)  Score: 19/28, 44/56, 17 sec  Functional Limitation: Mobility: Walking and moving around  Mobility: Walking and Moving Around Current Status (): At least 80 percent but less than 100 percent impaired, limited or restricted  Mobility: Walking and Moving Around Goal Status (): 0 percent impaired, limited or restricted    G-Code noted on 2018:   PT G-Codes  Functional Assessment Tool Used: Tinetti  Score:   Functional Limitation: Mobility: Walking and moving around  Mobility: Walking and Moving Around Current Status (): At least 80 percent but less than 100 percent impaired, limited or restricted  Mobility: Walking and Moving Around Goal Status (): 0 percent impaired, limited or restricted    Progress Note: []  Yes  [x]  No  Next due by: Visit #27 or 11/10/18    Subjective: Pt c/o weakness in R shoulder, difficulty pushing off of arm in past few weeks. Pt denies fall since last visit, but was sitting a lot. Pt initially appearing very flat/sad, but more interactive and brighter affect as session went on. Objective:  Pt continues with yogesh-gait pattern w/ decreased anjana, B LE ext rotated and requires SPC and R AFO.      Resting HR: 80bpm  HR goal for cardio exercise: 78-132bpm      Exercises: Session without AFO  Exercise/Equipment Resistance/Repetitions Other comments   Nustep  Workload 5, 2 minutes  [id number], 2 min x 2 (HR 92bpm)   70 spm, 1 min x 2  (80bpm) Increased effort to R side     Max HR 88bpm   Gastroc stretch edge of step 60 sec x 3    Eccentric gastroc on edge of step  10 x 2 Facilitation at pelvis for R weight shift   Prone hamstring RLE Only  10  X 2    45 degrees actively achieved by pt, needs therapist help to achieve full range. Fatigue at 5 reps each set   HEP added 9/5/18   Without weight  In HEP added 9/5/18    NMR     SLS //bars   No UE support, R LE improved to 5 sec   Step ups on foam Alternating, 10x   Forward and lateral  No dual task today   Lower trunk rotation stretch 10x each side    R KTC, abd/add, lower trunk rotation w/ SB in supine 10 x 1, 2 for KTC    Gait: SBA with SC, without AFO;  CGA without AD or AFO 50 ft x 6 Achieves R heel strike w/ foot slap following NMES. LOB when turning to R, able to recover w/ min A     Education:      Modalities:   HEP: 9/19/18:  1. 1/2 bridge without use of the AFO. The LLE is in figure \"4\" on the R LE. Pt instructed to do the ex to both LES. A bridge is performed in which ankle PF/ DF / and neutral are performed  with the supporting LE resulting in a slow count of \"3\" for  increasing the R ankle strategies. Yulisa 5 reps     2. PNF pattern with R  LE off of the table and crossing over the opposing LE(focus on leading with the toes)   3.  Prone position: alternating hamstring curls with focus on DF when performing   Pt demo 5 reps of each ex to demo a clear understanding of the ex( Pt is now yulisa 3/4 range with the RLE with  this ex)    Timed Code Treatment Minutes: NM 20; TE 38    Total Treatment Minutes:  58 minutes     Treatment/Activity Tolerance:  [x] Patient tolerated treatment well [] Patient limited by fatigue  [] Patient limited by pain  [] Patient limited by other medical complications  [] 1: Pt to be indep with all functional transfers and mobility all surfaces >/=1000ft to return as safe community ambulation. Progressing  Long term goal 2: Pt to ascend/descend flight of stairs indep for return to prior level and for community/family outtings/gatherings. [de-identified]  Long term goal 3: Pt to dec impairment Per TEJADA Tinetti DGY to 0% impairment Progressing   Long term goal 4: pt remain fall free at home and in therapy and confidence with ADLs/IADLS mobility in home and community to >/=75%. Progressing     Plan:   [x] Continue per plan of care [] Alter current plan (see comments)  [] Plan of care initiated [] Hold pending MD visit [] Discharge    Plan for Next Session: Progress neuromuscular re-educ for R hip- supine SB exer, ankle reactions, NMES R ant tib, progress dual tasks. Progress stair negotiation.       Electronically signed by:  Lina Beard DPT

## 2018-10-22 NOTE — FLOWSHEET NOTE
Occupational Therapy Daily Treatment Note    Date:  10/22/2018    Patient Name:  Catalina Reardon     :  1950  MRN: [id number]  Restrictions/Precautions: none. Has AFO for RLE   Medical/Treatment Diagnosis Information:  · L ischemic stroke affecting R side (L hand dominant)   · R side hemiparesis   Insurance/Certification information: Medicare    Physician Information: Dr. Coe General of care signed (Y/N):  Y  Visit# / total visits:   Pain level: 0    OT G-codes (10/12)  Functional Assessment Tool Used: Box and Blocks  Score: 47  Functional Limitation: Carrying, moving and handling objects  Carrying, Moving and Handling Objects Current Status (): At least 60 percent but less than 80 percent impaired, limited or restricted  Carrying, Moving and Handling Objects Goal Status (): At least 60 percent but less than 80 percent impaired, limited or restricted     Functional Assessment Tool Used: Quick Dash  (10/12)  Score: 29  Functional Limitation: Self care  Self Care Current Status (): At least 40 percent but less than 60 percent impaired, limited or restricted  Self Care Goal Status (): At least 1 percent but less than 20 percent impaired, limited or restricted    Progress Note: []  Yes  [x]  No  Next due by: Visit #24  (or )    Subjective: denied new issues/complaints    Therapeutic Activities:    Activity #1: Patient completed in-hand manipulation task (Connect 4) x5 pieces. Increased use of gravity-assist for finger-to-palm translation of pieces by supinating hand, unable to complete without supinating hand d/t decreased functional use of digits 3-5. Min tactile cues required to decrease sh elevation RUE when grasping to place pieces into slot.     Activity #2: Patient utilized 1.1# resistive clothespin to grasp x10 items with digits 1/2, x10 items digits 1/3, x10 items 1/4, and x5 items digit 1/5 and replace within container to promote increased gross hand strength,

## 2018-10-24 ENCOUNTER — HOSPITAL ENCOUNTER (OUTPATIENT)
Dept: PHYSICAL THERAPY | Age: 68
Setting detail: THERAPIES SERIES
Discharge: HOME | End: 2018-10-24
Payer: MEDICARE

## 2018-10-24 ENCOUNTER — HOSPITAL ENCOUNTER (OUTPATIENT)
Dept: OCCUPATIONAL THERAPY | Age: 68
Setting detail: THERAPIES SERIES
Discharge: HOME | End: 2018-10-24
Payer: MEDICARE

## 2018-10-24 PROCEDURE — 97112 NEUROMUSCULAR REEDUCATION: CPT

## 2018-10-24 PROCEDURE — 97530 THERAPEUTIC ACTIVITIES: CPT

## 2018-10-24 PROCEDURE — 97110 THERAPEUTIC EXERCISES: CPT

## 2018-10-24 NOTE — FLOWSHEET NOTE
Occupational Therapy Daily Treatment Note    Date:  10/24/2018    Patient Name:  Carmencita Kenny     :  1950  MRN: [id number]  Restrictions/Precautions: none. Has AFO for RLE   Medical/Treatment Diagnosis Information:  · L ischemic stroke affecting R side (L hand dominant)   · R side hemiparesis   Insurance/Certification information: Medicare    Physician Information: Dr. Toribio Jarquin of care signed (Y/N):  Y  Visit# / total visits:   Pain level: 0    OT G-codes (10/12)  Functional Assessment Tool Used: Box and Blocks  Score: 47  Functional Limitation: Carrying, moving and handling objects  Carrying, Moving and Handling Objects Current Status (): At least 60 percent but less than 80 percent impaired, limited or restricted  Carrying, Moving and Handling Objects Goal Status (): At least 60 percent but less than 80 percent impaired, limited or restricted     Functional Assessment Tool Used: Quick Dash  (10/12)  Score: 29  Functional Limitation: Self care  Self Care Current Status (): At least 40 percent but less than 60 percent impaired, limited or restricted  Self Care Goal Status (): At least 1 percent but less than 20 percent impaired, limited or restricted    Progress Note: []  Yes  [x]  No  Next due by: Visit #24  (or )    Subjective: denied new issues/complaints. Continues to report frustration with decreased extension in digit 5. Therapeutic Activities:    Activity #1: Utilized digit 4-5 with 2# clothespin to retrieve x20 items. Patient reporting increased difficulty and fatigue with task and noted multiple times with need to adjust  several times throughout. Graded down to clothespin with 1# resistance for an additional 20 items; continue to noted increased use of lateral pinch for stability rather than true tip pinch with opposition.       Activity #2: While completing e-stim to promote increased digit extension, patient complete digit extension of digit 5 Impairments: Decreased functional mobility ; Decreased ROM; Decreased strength;Decreased fine motor control;Decreased high-level IADLs;Decreased balance;Decreased coordination    Assessment: Patient demonstrates improved extension of digit 5 with use of estim and is able to carryover benefits for short term following completion of stimulation. Continues to be limited by cervical muscle discomfort with abduction in supine as well as with horizontal abduction in unsupported sitting. Would continue to benefit from skilled OT services to address these deficits, cont per POC. Patient Education: progress made towards goals, verb understanding    Treatment/Activity Tolerance:     [x]  Patient tolerated treatment well []  Patient limited by fatigue    []  Patient limited by pain []  Patient limited by other medical complications   []  Other:     Prognosis: [x]  Good []  Fair  []  Poor    Patient Requires Follow-up:  [x]  Yes  []  No    Plan: [x]  Continue per plan of care []  Alter current plan (see comments)   []  Plan of care initiated []  Hold pending MD visit []  Discharge    Plan for Next Session:  RUE strengthening in supine position to focus on elbow extension during exercises. Progress to RUE exercises in unsupported sit/stand. Work on RUE fine/gross motor coordination and R hand strengthening, specifically of digits 3-5. Incorporate dynamic standing balance activities with functional activities of RUE.  Trial e-stim to R 5th digit extensor as an adjunctive treatment intervention for strengthening during functional task practice    Goals  Short term goals  Time Frame for Short term goals: 6 weeks   Short term goal 1: Pt will improve score on BBT to 25 blocks in one minute to demonstrate improved functional grasp/release of R hand - goal met 9/14  Short term goal 2: Pt will improve time on NHPT to no more than 2 minutes to demonstrate improved fine motor coordination - goal met 9/14  Short term goal 3: Pt will

## 2018-10-24 NOTE — FLOWSHEET NOTE
Physical Therapy Daily Treatment Note  Date:  10/24/2018    Patient Name:  Hawa Arroyo    :  1950  MRN: [id number]  Restrictions/Precautions:  Diabetes, impulsive, right ankle and knee pre-existing conditions, wears glasses, R hip arthritis  Medical/Treatment Diagnosis Information:  · Diagnosis: CVA I63.40  · Treatment Diagnosis: balance problems 2/2 CVA  Insurance/Certification information:  PT Insurance Information: Medicare  Physician Information:  Referring Practitioner: Dr. Gisella Joy MD Follow-up: Not known  Plan of care signed (Y/N):  Y  Visit# / total visits:   Pain level: 0/10      G-Code noted on 10/10/2018:   PT G-Codes  Functional Assessment Tool Used: Tinetti, Vincent, TUG (w/ AFO and SPC)  Score: 19/28, 44/56, 17 sec  Functional Limitation: Mobility: Walking and moving around  Mobility: Walking and Moving Around Current Status (): At least 80 percent but less than 100 percent impaired, limited or restricted  Mobility: Walking and Moving Around Goal Status (): 0 percent impaired, limited or restricted    G-Code noted on 2018:   PT G-Codes  Functional Assessment Tool Used: Tinetti  Score:   Functional Limitation: Mobility: Walking and moving around  Mobility: Walking and Moving Around Current Status (): At least 80 percent but less than 100 percent impaired, limited or restricted  Mobility: Walking and Moving Around Goal Status (): 0 percent impaired, limited or restricted    Progress Note: []  Yes  [x]  No  Next due by: Visit #27 or 11/10/18    Subjective: Pt reports his wife has pink eye and he now noticed this morning his R eye is pink and swollen. Pt notes that he still is having trouble walking without AFO at home when he turns to the R, catching R lateral border of foot on floor. Objective:  Pt continues with yogesh-gait pattern w/ decreased anjana, B LE ext rotated and requires SPC and R AFO. R eye slightly pink and swollen.      Resting

## 2018-10-29 ENCOUNTER — HOSPITAL ENCOUNTER (OUTPATIENT)
Dept: PHYSICAL THERAPY | Age: 68
Setting detail: THERAPIES SERIES
Discharge: HOME | End: 2018-10-29
Payer: MEDICARE

## 2018-10-29 ENCOUNTER — HOSPITAL ENCOUNTER (OUTPATIENT)
Dept: OCCUPATIONAL THERAPY | Age: 68
Setting detail: THERAPIES SERIES
Discharge: HOME | End: 2018-10-29
Payer: MEDICARE

## 2018-10-29 PROCEDURE — G8985 CARRY GOAL STATUS: HCPCS

## 2018-10-29 PROCEDURE — 97116 GAIT TRAINING THERAPY: CPT

## 2018-10-29 PROCEDURE — 97112 NEUROMUSCULAR REEDUCATION: CPT

## 2018-10-29 PROCEDURE — 97110 THERAPEUTIC EXERCISES: CPT

## 2018-10-29 PROCEDURE — 97530 THERAPEUTIC ACTIVITIES: CPT

## 2018-10-29 PROCEDURE — G8984 CARRY CURRENT STATUS: HCPCS

## 2018-10-29 NOTE — FLOWSHEET NOTE
with yogesh-gait pattern w/ decreased anjana, B LE ext rotated and requires SPC and R AFO. Resting HR:   HR goal for cardio exercise: 78-132bpm       Exercises: Session without AFO  Exercise/Equipment Resistance/Repetitions Other comments   Nustep  Workload 5, 2 minutes  [id number], 2 min x 2 (HR 92bpm)   70+ spm, 1 min x 2  (80bpm) Increased effort to R side        Gastroc stretch edge of step 60 sec x 3    Eccentric gastroc on edge of step  10 x 2 Facilitation at pelvis for R weight shift   NMR     SLS //bars   No UE support, R LE improved to 6-7 sec   Static stance on foam w/ R weight shift 1 min x 2 Decreased R ankle reactions compared to L    Step ups on foam Alternating, 10x   Forward and lateral  No dual task today   R KTC, abd/add, lower trunk rotation w/ SB in supine 10 x 1, 2 for KTC    Mult direction stepping 10 min. Forward, backward,  stepping over 1/2 foam roll  W/ use of NMES during swing phase  CGA, mod A for LOB x 3   Gait: SBA with SC, without AFO;  CGA without AD or AFO 50 ft x 6 Achieves R heel strike LOB when turning to R   Stairs 4 x 2 w/ single rail One incidence of B knee buckling upon ascending last step, able to self correct w/ min A   Step ups 10x R leading ascent  10 x L leading ascent  6\" step //bars Single to no UE support  Requires VC from mirror for accurate foot placement     Education:      Modalities:   HEP: 9/19/18:  1. 1/2 bridge without use of the AFO. The LLE is in figure \"4\" on the R LE. Pt instructed to do the ex to both LES. A bridge is performed in which ankle PF/ DF / and neutral are performed  with the supporting LE resulting in a slow count of \"3\" for  increasing the R ankle strategies. Sonia 5 reps     2. PNF pattern with R  LE off of the table and crossing over the opposing LE(focus on leading with the toes)   3.  Prone position: alternating hamstring curls with focus on DF when performing   Pt demo 5 reps of each ex to demo a clear understanding of the ex( Pt functional strength to >/3+ to4-/5 needed for gait mechanics, ease of heel off/toe off and initial heel strike on the involved LE . Progressing     Long term goals    Time Frame for Long term goals : 8 weeks  Long term goal 1: Pt to be indep with all functional transfers and mobility all surfaces >/=1000ft to return as safe community ambulation. Progressing  Long term goal 2: Pt to ascend/descend flight of stairs indep for return to prior level and for community/family outtings/gatherings. [de-identified]  Long term goal 3: Pt to dec impairment Per TEJADA Tinetti DGY to 0% impairment Progressing   Long term goal 4: pt remain fall free at home and in therapy and confidence with ADLs/IADLS mobility in home and community to >/=75%. Progressing     Plan:   [x] Continue per plan of care [] Alter current plan (see comments)  [] Plan of care initiated [] Hold pending MD visit [] Discharge    Plan for Next Session: Progress neuromuscular re-educ for R hip- supine SB exer, ankle reactions, NMES R ant tib, progress dual tasks. Progress stair negotiation.       Electronically signed by:  Ten Gomez DPT

## 2018-10-29 NOTE — PROGRESS NOTES
tight jars/containers - goal met 9/14  Short term goal 4: Pt will improve RUE AROM shoulder flexion (in unsupported sit) to 115 degrees to facilitate independence with reaching for items overhead - goal met 10/29  Short term goal 5: Pt will improve RUE AROM shoulder abduction (in unsupported sit) to 120 degrees to facilitate independence with recreational activities - goal met 10/12    Long term goals  Time Frame for Long term goals : 8 weeks  Long term goal 1: Pt will improve score on BBT to 35 blocks in one minute to demonstrate improved functional grasp/release of R hand - progressing, continue  Long term goal 2: Pt will improve time on NHPT to no more than 1 minute and 30 seconds to demonstrate improved fine motor coordination - goal met 9/14  Long term goal 3: Pt will improve R hand  strength to 40# to facilitate independence with opening tight jars/containers - goal met 9/14  Long term goal 4: Pt will improve RUE AROM shoulder flexion (in unsupported sit) to 125 degrees to facilitate independence with reaching for items overhead - progressing, continue  Long term goal 5: Pt will improve RUE AROM shoulder abduction (in unsupported sit) to 130 degrees to facilitate independence with recreational activities - goal met 10/12    Goals for Re-certification:  Short term goals  Time Frame for Short term goals: 4 weeks   Short term goal 1:  Patient will improve score on BBP to 35 blocks in one minute in order to demonstrate improved functional grasp/release of R hand  Short term goal 2: Pt will improve RUE AROM shoulder flexion (in unsupported sit) to 125 degrees to facilitate independence with reaching for items overhead  Short term goal 3:  Pt will demonstrate ability to complete extension of digit 5 from the tabletop on 3/10 trials in order to promote increased grasp strength and functional use of the L hand   Short term goal 4: Pt will complete finger-to-palm and palm-to-finger translation of x10  1/2\" foam squares in no more than 50s in order to improve functional use of digits 3-5    Long term goals  Time Frame for Long term goals : 8 weeks  Long term goal 1: Patient will improve score on BBP to 42 blocks in one minute in order to demonstrate improved functional grasp/release of R hand  Long term goal 2: Pt will complete finger-to-palm and palm-to-finger translation of x10  1/2\" foam squares in no more than 50s in order to improve functional use of digits 3-5  Long term goal 3: Pt will demonstrate ability to complete extension of digit 5 from the tabletop on 6/10 trials in order to promote increased grasp strength and functional use of the L hand     Patient Goals   Patient goals : \"To use right hand\"        Electronically signed by:   KARRI Eller/HERMANN

## 2018-10-31 ENCOUNTER — HOSPITAL ENCOUNTER (OUTPATIENT)
Dept: PHYSICAL THERAPY | Age: 68
Setting detail: THERAPIES SERIES
Discharge: HOME | End: 2018-10-31
Payer: MEDICARE

## 2018-10-31 ENCOUNTER — HOSPITAL ENCOUNTER (OUTPATIENT)
Dept: OCCUPATIONAL THERAPY | Age: 68
Setting detail: THERAPIES SERIES
Discharge: HOME | End: 2018-10-31
Payer: MEDICARE

## 2018-10-31 PROCEDURE — 97530 THERAPEUTIC ACTIVITIES: CPT

## 2018-10-31 PROCEDURE — 97112 NEUROMUSCULAR REEDUCATION: CPT

## 2018-10-31 PROCEDURE — 97110 THERAPEUTIC EXERCISES: CPT

## 2018-10-31 NOTE — FLOWSHEET NOTE
translation. Second trial completed with min cues to decrease compensatory methods, drop 5/10 with finger to palm translation (with the majority of coins dropped d/t decreased stabilization with digits 3-5) and 3/10 with palm-to-finger translation. Activity #3:  Replicated hand positions RUE x26 (ASL alphabet) to promote increased finger isolation and digit extension. Noted decreased ability to achieve intrinsic minus position d/t decreased ability to achieve extension across MCP at all joints. Increased difficulty extending digits 3-5 while maintain flexed position with digit 2. Significant compensatory movements at shoulder noted with attempt to complete all movements. Therapeutic Exercise:   Exercise/Equipment  Resistance/Repetitions   Other comments   Supine RUE shoulder flexion AROM     1 set x 15 reps with 2# free weight Attempted to complete with 3# weight but unable to complete repetition with good control of movement. Min cues required for adduction initially, although improved as repetitions increased. Supine RUE shoulder abduction AROM      1 set x 15 reps with 1# weight SBA. Supine RUE horizontal ab/adduction     1 set x 15 reps with 3# free weight Completed bilaterally; reporting no pinching as was present when completed in unsupported sitting. Supine RUE overhead tricep extension   1 set x 15 reps with 3# free weight SBA     Edge of mat RUE shoulder flexion AROM 15 reps Min v/c to limit scapular elevation. Edge of mat RUE shoulder abduction AROM 1 set x10 reps Pt required moderate tactile and verbal cues for effective movement patterns, noted with increased scapular elevation and trunk left lateral lean. Edge of mat RUE shoulder horizontal ab/adduction AROM x3 reps only Discontinued d/t patient report of sharp pain with transition/directional change of movement.     AROM finger extension AROM x10 reps Unable to extend any fingers with stabilization of palm/MCPs along the

## 2018-11-05 ENCOUNTER — APPOINTMENT (OUTPATIENT)
Dept: PHYSICAL THERAPY | Age: 68
End: 2018-11-05
Payer: MEDICARE

## 2018-11-05 ENCOUNTER — APPOINTMENT (OUTPATIENT)
Dept: OCCUPATIONAL THERAPY | Age: 68
End: 2018-11-05
Payer: MEDICARE

## 2018-11-05 ENCOUNTER — HOSPITAL ENCOUNTER (OUTPATIENT)
Dept: PHYSICAL THERAPY | Age: 68
Setting detail: THERAPIES SERIES
Discharge: HOME | End: 2018-11-05
Payer: MEDICARE

## 2018-11-05 PROCEDURE — 97116 GAIT TRAINING THERAPY: CPT

## 2018-11-05 PROCEDURE — 97112 NEUROMUSCULAR REEDUCATION: CPT

## 2018-11-05 PROCEDURE — G8978 MOBILITY CURRENT STATUS: HCPCS

## 2018-11-05 PROCEDURE — 97110 THERAPEUTIC EXERCISES: CPT

## 2018-11-05 PROCEDURE — G8979 MOBILITY GOAL STATUS: HCPCS

## 2018-11-05 NOTE — PLAN OF CARE
extensibility)                [x] Modalities (For modulating pain/tenderness/paresthesias, reducing swelling/inflammation/tightness, improving soft tissue extensibility, and/or to increase muscle tone/strength):     [] Ultrasound  [x] Electrical Stimulation        [] Cervical Traction [] Lumbar Traction    ? [] Cold/hotpack [] Iontophoresis   Other:      []          []    Objective:   Functional Assessment Tool Used: Tinetti 21/28, Tejada 45/56, TUG (w/ AFO and SPC) 11 sec, DGI 16/24    Assessment: Pt progressive improvement in most areas of mobility, most notably reduced TUG time to 11 sec. Pt continues to be limited in static and dynamic balance especially due to ongoing sensory deficits related to peripheral neuropathy in addition to slowly improving R LE weakness. Pt in agreement to continue PT, very motivated to improve strength, balance and independence w/ mobility. Pt will continue to benefit from skilled PT to progress strength, balance and functional mobility to reduce fall risk and return to as close to prior level of function as possible. Goals:  Short term goals  Time Frame for Short term goals: 4 weeks  Short term goal 1: Pt to be indep with HEP to max rehab potential. Goal achieved and d/cd  Short term goal 2: Pt to illustrate good to normal sitting balance for ease of transfers and bed mobility skills/ADLS. Goal achieved and d/cd  Short term goal 3: Pt to illustrate improve functional mobility by dec time per TUG < or =13 secs Met, 11 sec   Short term goal 4: Pt to dec impairment per TEJADA to less than 40%. Goal achieved   Short term goal 5: Pt to dec impairment per Tinetti to less than 40% . Goal achieved   Short term goal 6: Pt to dec impairment per DGI to less than 40% . Not met, progressing.  Score 16/24  Short term goal 7: Pt to improve functional strength in LE evident by improving sit/ 30 seconds from 6 with use of hands to without UE support/use 30 seconds to complete >/=10reps with

## 2018-11-05 NOTE — FLOWSHEET NOTE
Physical Therapy Daily Treatment Note  Date:  2018    Patient Name:  Robina Moreno    :  1950  MRN: [id number]  Restrictions/Precautions:  Diabetes, impulsive, right ankle and knee pre-existing conditions, wears glasses, R hip arthritis  Medical/Treatment Diagnosis Information:  · Diagnosis: CVA I63.40  · Treatment Diagnosis: balance problems 2/2 CVA  Insurance/Certification information:  PT Insurance Information: Medicare  Physician Information:  Referring Practitioner: Dr. Yessi Mccauley MD Follow-up: Not known  Plan of care signed (Y/N):  Y  Visit# / total visits:  ([place] [place] today to add 16 visits)  Pain level: 0/10    G-Code noted on 2018:   PT G-Codes  Functional Assessment Tool Used: NabilettiCarlton, TUG (w/ AFO and SPC), DGI  Score: /, 45/56, 11 sec.   Functional Limitation: Mobility: Walking and moving around  Mobility: Walking and Moving Around Current Status (): At least 20 percent but less than 40 percent impaired, limited or restricted  Mobility: Walking and Moving Around Goal Status (): 0 percent impaired, limited or restricted    G-Code noted on 10/10/2018:   PT G-Codes  Functional Assessment Tool Used: Nabiletti Vincent, TUG (w/ AFO and SPC)  Score: /28, 44/56, 17 sec  Functional Limitation: Mobility: Walking and moving around  Mobility: Walking and Moving Around Current Status (): At least 40 percent but less than 60 percent impaired, limited or restricted  Mobility: Walking and Moving Around Goal Status (): 0 percent impaired, limited or restricted    G-Code noted on 2018:   PT G-Codes  Functional Assessment Tool Used: Tinetti  Score:   Functional Limitation: Mobility: Walking and moving around  Mobility: Walking and Moving Around Current Status ():  At least 80 percent but less than 100 percent impaired, limited or restricted  Mobility: Walking and Moving Around Goal Status (): 0 percent impaired, limited or restricted    Progress Increase LE strength as evidenced by pt moving STS x 10 reps in 30 secs w/ or w/o use of UES- Progressing, 7 times. NT this session. Short term goal 8: Improve involved LE functional strength to >/3+ to4-/5 needed for gait mechanics, ease of heel off/toe off and initial heel strike on the involved LE . Progressing     Long term goals    Time Frame for Long term goals : 8 weeks  Long term goal 1: Pt to be indep with all functional transfers and mobility all surfaces >/=1000ft to return as safe community ambulation. Progressing  Long term goal 2: Pt to ascend/descend flight of stairs indep for return to prior level and for community/family outtings/gatherings. [de-identified]  Long term goal 3: Pt to dec impairment Per TEJADA Tinetti DGY to 0% impairment Progressing   Long term goal 4: pt remain fall free at home and in therapy and confidence with ADLs/IADLS mobility in home and community to >/=75%. Progressing, has not had falls in past 2 weeks. Plan:   [x] Continue per plan of care [x] Alter current plan- UPOC to add 2x/week for 8 weeks. [] Plan of care initiated [] Hold pending MD visit [] Discharge      Plan for Next Session: Progress neuromuscular re-educ for R hip- supine SB exer, ankle reactions,  progress dual tasks, dynamic balance. Progress stair negotiation.       Electronically signed by:  Ten Gomez DPT

## 2018-11-07 ENCOUNTER — HOSPITAL ENCOUNTER (OUTPATIENT)
Dept: PHYSICAL THERAPY | Age: 68
Setting detail: THERAPIES SERIES
Discharge: HOME | End: 2018-11-07
Payer: MEDICARE

## 2018-11-07 ENCOUNTER — HOSPITAL ENCOUNTER (OUTPATIENT)
Dept: OCCUPATIONAL THERAPY | Age: 68
Setting detail: THERAPIES SERIES
Discharge: HOME | End: 2018-11-07
Payer: MEDICARE

## 2018-11-07 PROCEDURE — 97110 THERAPEUTIC EXERCISES: CPT

## 2018-11-07 PROCEDURE — 97112 NEUROMUSCULAR REEDUCATION: CPT

## 2018-11-07 PROCEDURE — 97530 THERAPEUTIC ACTIVITIES: CPT

## 2018-11-07 NOTE — FLOWSHEET NOTE
bed mobility skills/ADLS. Goal achieved and d/cd  Short term goal 3: Pt to illustrate improve functional mobility by dec time per TUG < or =13 secs Met, 11 sec   Short term goal 4: Pt to dec impairment per TEJADA to less than 40%. Goal achieved   Short term goal 5: Pt to dec impairment per Tinetti to less than 40% . Goal achieved   Short term goal 6: Pt to dec impairment per DGI to less than 40% . Not met, progressing. Score 16/24  Short term goal 7: Pt to improve functional strength in LE evident by improving sit/ 30 seconds from 6 with use of hands to without UE support/use 30 seconds to complete >/=10reps with good descending control and without relying on the back of stabilized chair. Goal modified: Increase LE strength as evidenced by pt moving STS x 10 reps in 30 secs w/ or w/o use of UES- Progressing, 7 times. NT this session. Short term goal 8: Improve involved LE functional strength to >/3+ to4-/5 needed for gait mechanics, ease of heel off/toe off and initial heel strike on the involved LE . Progressing     Long term goals    Time Frame for Long term goals : 8 weeks  Long term goal 1: Pt to be indep with all functional transfers and mobility all surfaces >/=1000ft to return as safe community ambulation. Progressing  Long term goal 2: Pt to ascend/descend flight of stairs indep for return to prior level and for community/family outtings/gatherings. [de-identified]  Long term goal 3: Pt to dec impairment Per TEJADA Tinetti DGY to 0% impairment Progressing   Long term goal 4: pt remain fall free at home and in therapy and confidence with ADLs/IADLS mobility in home and community to >/=75%. Progressing, has not had falls in past 2 weeks. Plan:   [x] Continue per plan of care [] Alter current plan- UPOC to add 2x/week for 8 weeks.    [] Plan of care initiated [] Hold pending MD visit [] Discharge      Plan for Next Session: Progress neuromuscular re-educ for R hip- supine SB exer, ankle reactions,

## 2018-11-07 NOTE — FLOWSHEET NOTE
extension following completion of extended arm weightbearing and e-stim this date. Continues to demonstrate increased difficulty with in hand manipulation without use of compensatory posturing and techniques. Would continue to benefit from OT services, cont POC. Performance deficits / Impairments: Decreased functional mobility ; Decreased ROM; Decreased strength;Decreased fine motor control;Decreased high-level IADLs;Decreased balance;Decreased coordination      Patient Education: in-hand manipulation, digit isolation and finger extension - verb understanding     Treatment/Activity Tolerance:     [x]  Patient tolerated treatment well []  Patient limited by fatigue    []  Patient limited by pain []  Patient limited by other medical complications   []  Other:     Prognosis: [x]  Good []  Fair  []  Poor    Patient Requires Follow-up:  [x]  Yes  []  No    Plan: [x]  Continue per plan of care []  Alter current plan (see comments)   []  Plan of care initiated []  Hold pending MD visit []  Discharge    Plan for Next Session:  RUE strengthening in unsupported sitting position to focus on elbow extension during exercises. Progress to RUE exercises in unsupported stand. Work on RUE fine/gross motor coordination and R hand strengthening, specifically of digits 3-5. Incorporate dynamic standing balance activities with functional activities of RUE.  Trial e-stim to R 5th digit extensor as an adjunctive treatment intervention for strengthening during functional task practice    Goals  Short term goals  Time Frame for Short term goals: 4 weeks   Short term goal 1:  Patient will improve score on BBP to 35 blocks in one minute in order to demonstrate improved functional grasp/release of R hand  Short term goal 2: Pt will improve RUE AROM shoulder flexion (in unsupported sit) to 125 degrees to facilitate independence with reaching for items overhead  Short term goal 3:  Pt will demonstrate ability to complete extension of digit 5

## 2018-11-12 ENCOUNTER — HOSPITAL ENCOUNTER (OUTPATIENT)
Dept: PHYSICAL THERAPY | Age: 68
Setting detail: THERAPIES SERIES
Discharge: HOME | End: 2018-11-12
Payer: MEDICARE

## 2018-11-12 PROCEDURE — 97112 NEUROMUSCULAR REEDUCATION: CPT

## 2018-11-12 PROCEDURE — 97110 THERAPEUTIC EXERCISES: CPT

## 2018-11-12 NOTE — FLOWSHEET NOTE
Physical Therapy Daily Treatment Note  Date:  2018    Patient Name:  Aria Boyd    :  1950  MRN: [id number]  Restrictions/Precautions:  Diabetes, impulsive, right ankle and knee pre-existing conditions, wears glasses, R hip arthritis  Medical/Treatment Diagnosis Information:  · Diagnosis: CVA I63.40  · Treatment Diagnosis: balance problems 2/2 CVA  Insurance/Certification information:  PT Insurance Information: Medicare  Physician Information:  Referring Practitioner: Dr. Soledad Ortega MD Follow-up: Not known  Plan of care signed (Y/N):  Y  Visit# / total visits:   Pain level: 0/10    Progress Note: []  Yes  [x]  No  Next due by: Visit #32 or 18    Subjective: Pt reports he woke up w/ double vision in L eye this morning. This happens occasionally. Began to subside during session, fully resolved by end of session. Also c/o L UE weakness, feels it is due to ongoing issues w/ \"pinched nerve in neck\". Pt reports he feels he is getting better because he had very vivid dreams last night. He woke up sweating which is what he used to do for year prior to CVA. Objective:  Pt continues with yogesh-gait pattern w/ decreased anjana, B LE ext rotated and requires SPC and R AFO. Improved R foot clearance without AFO.    BP: 150/64      Exercises: Session without AFO  Exercise/Equipment Resistance/Repetitions Other comments   Nustep  Workload 5, 2 minutes  [id number], 2 min x 2 (HR 92bpm)   70+ spm, 1 min x 2  (80bpm) 10 min. total        Gastroc stretch edge of step 60 sec x 3    Eccentric gastroc on edge of step  10 x 2 Pt able to self correct for R weight shift   NMR          R KTC, abd/add, lower trunk rotation w/ SB in supine 15 x 1, 2 for KTC    Gait: SBA with SC, without AFO;  CGA without AD or AFO 20ft x 6 Achieves R heel strike LOB when turning to R     Education/Modalities: pt educated on use of new NMES home unit, trialed on R anterior tibialis supine over bolster w/ active functional mobility by dec time per TUG < or =13 secs Met, 11 sec   Short term goal 4: Pt to dec impairment per TEJADA to less than 40%. Goal achieved   Short term goal 5: Pt to dec impairment per Tinetti to less than 40% . Goal achieved   Short term goal 6: Pt to dec impairment per DGI to less than 40% . Not met, progressing. Score 16/24  Short term goal 7: Pt to improve functional strength in LE evident by improving sit/ 30 seconds from 6 with use of hands to without UE support/use 30 seconds to complete >/=10reps with good descending control and without relying on the back of stabilized chair. Goal modified: Increase LE strength as evidenced by pt moving STS x 10 reps in 30 secs w/ or w/o use of UES- Progressing, 7 times. NT this session. Short term goal 8: Improve involved LE functional strength to >/3+ to4-/5 needed for gait mechanics, ease of heel off/toe off and initial heel strike on the involved LE . Progressing     Long term goals    Time Frame for Long term goals : 8 weeks  Long term goal 1: Pt to be indep with all functional transfers and mobility all surfaces >/=1000ft to return as safe community ambulation. Progressing  Long term goal 2: Pt to ascend/descend flight of stairs indep for return to prior level and for community/family outtings/gatherings. [de-identified]  Long term goal 3: Pt to dec impairment Per TEJADA Tinetti DGY to 0% impairment Progressing   Long term goal 4: pt remain fall free at home and in therapy and confidence with ADLs/IADLS mobility in home and community to >/=75%. Progressing, has not had falls in past 2 weeks. Plan:   [x] Continue per plan of care [] Alter current plan- UPOC to add 2x/week for 8 weeks. [] Plan of care initiated [] Hold pending MD visit [] Discharge      Plan for Next Session: Dynamic balance, ankle reactions,  progress dual tasks, progress single leg squats.      G-Code noted on 11/5/2018:   PT G-Codes  Functional Assessment Tool Used: Morena Garrison, TUG (w/ AFO and SPC), DGI  Score: 21/28, 45/56, 11 sec. 16/24  Functional Limitation: Mobility: Walking and moving around  Mobility: Walking and Moving Around Current Status (): At least 20 percent but less than 40 percent impaired, limited or restricted  Mobility: Walking and Moving Around Goal Status (): 0 percent impaired, limited or restricted    G-Code noted on 10/10/2018:   PT G-Codes  Functional Assessment Tool Used: Tinetti, Vincent, TUG (w/ AFO and SPC)  Score: 19/28, 44/56, 17 sec  Functional Limitation: Mobility: Walking and moving around  Mobility: Walking and Moving Around Current Status (): At least 40 percent but less than 60 percent impaired, limited or restricted  Mobility: Walking and Moving Around Goal Status (): 0 percent impaired, limited or restricted    G-Code noted on 8/17/2018:   PT G-Codes  Functional Assessment Tool Used: Tinetti  Score: 1/24  Functional Limitation: Mobility: Walking and moving around  Mobility: Walking and Moving Around Current Status ():  At least 80 percent but less than 100 percent impaired, limited or restricted  Mobility: Walking and Moving Around Goal Status (): 0 percent impaired, limited or restricted    Electronically signed by:  Jazz Pop DPT

## 2018-11-14 ENCOUNTER — HOSPITAL ENCOUNTER (OUTPATIENT)
Dept: OCCUPATIONAL THERAPY | Age: 68
Setting detail: THERAPIES SERIES
Discharge: HOME | End: 2018-11-14
Payer: MEDICARE

## 2018-11-14 ENCOUNTER — HOSPITAL ENCOUNTER (OUTPATIENT)
Dept: PHYSICAL THERAPY | Age: 68
Setting detail: THERAPIES SERIES
Discharge: HOME | End: 2018-11-14
Payer: MEDICARE

## 2018-11-14 PROCEDURE — 97110 THERAPEUTIC EXERCISES: CPT

## 2018-11-14 PROCEDURE — 97112 NEUROMUSCULAR REEDUCATION: CPT

## 2018-11-14 PROCEDURE — 97530 THERAPEUTIC ACTIVITIES: CPT

## 2018-11-14 NOTE — FLOWSHEET NOTE
(): 0 percent impaired, limited or restricted    G-Code noted on 10/10/2018:   PT G-Codes  Functional Assessment Tool Used: Tinetti, Vincent, TUG (w/ AFO and SPC)  Score: 19/28, 44/56, 17 sec  Functional Limitation: Mobility: Walking and moving around  Mobility: Walking and Moving Around Current Status (): At least 40 percent but less than 60 percent impaired, limited or restricted  Mobility: Walking and Moving Around Goal Status (): 0 percent impaired, limited or restricted    G-Code noted on 8/17/2018:   PT G-Codes  Functional Assessment Tool Used: Tinetti  Score: 1/24  Functional Limitation: Mobility: Walking and moving around  Mobility: Walking and Moving Around Current Status ():  At least 80 percent but less than 100 percent impaired, limited or restricted  Mobility: Walking and Moving Around Goal Status (): 0 percent impaired, limited or restricted    Electronically signed by:  Adarsh Mao DPT

## 2018-11-14 NOTE — FLOWSHEET NOTE
Occupational Therapy Daily Treatment Note    Date:  2018    Patient Name:  Jimmy Arroyo     :  1950  MRN: [id number]  Restrictions/Precautions: none. Has AFO for RLE   Medical/Treatment Diagnosis Information:  · L ischemic stroke affecting R side (L hand dominant)   · R side hemiparesis   Insurance/Certification information: Medicare    Physician Information: Dr. Nathan Ramirez of care signed (Y/N):  Y  Visit# / total visits: / 48  Pain level: 0/10  OT G-codes (10/12)  Functional Assessment Tool Used: Box and Blocks  Score: 32  Functional Limitation: Carrying, moving and handling objects  Carrying, Moving and Handling Objects Current Status (): At least 60 percent but less than 80 percent impaired, limited or restricted  Carrying, Moving and Handling Objects Goal Status (): At least 60 percent but less than 80 percent impaired, limited or restricted          Progress Note: [x]  Yes  []  No  Next due by: Visit #33  (or )    Subjective: Reports that patient in continuing efforts to obtain an e-stim unit (rather than TENS unit) for use at home. Therapeutic Activities:    Activity #1: Folded x2 large sheets and 1 towel in stance to promote increased functional use of BUE for IADL tasks. Patient demonstrate 1 posterior LOB requiring CGA-MIN assist to correct when folding a sheet. Improved ROM without compensation noted compared to strictly exercise trials. Activity #2:  Completed nut/bolt activity with use of R hand to promote increased in-hand manipulation. Extended time required to complete. Min tactile cues required to prevent compensatory posturing. Therapeutic Exercise:   Exercise/Equipment  Resistance/Repetitions   Other comments   Edge of mat RUE shoulder flexion AROM 15 reps SBA, improved ability to self-correct scapular elevation with v/c.       Edge of mat RUE shoulder abduction AROM 15 reps SBA     Edge of mat RUE shoulder horizontal ab/adduction AROM 15 reps intervention for strengthening during functional task practice    Goals  Short term goals  Time Frame for Short term goals: 4 weeks   Short term goal 1:  Patient will improve score on BBP to 35 blocks in one minute in order to demonstrate improved functional grasp/release of R hand  Short term goal 2: Pt will improve RUE AROM shoulder flexion (in unsupported sit) to 125 degrees to facilitate independence with reaching for items overhead  Short term goal 3:  Pt will demonstrate ability to complete extension of digit 5 from the tabletop on 3/10 trials in order to promote increased grasp strength and functional use of the L hand   Short term goal 4: Pt will complete finger-to-palm and palm-to-finger translation of x10  1/2\" foam squares in no more than 50s in order to improve functional use of digits 3-5    Long term goals  Time Frame for Long term goals : 8 weeks  Long term goal 1: Patient will improve score on BBP to 42 blocks in one minute in order to demonstrate improved functional grasp/release of R hand  Long term goal 2: Pt will complete finger-to-palm and palm-to-finger translation of x10  1/2\" foam squares in no more than 50s in order to improve functional use of digits 3-5  Long term goal 3: Pt will demonstrate ability to complete extension of digit 5 from the tabletop on 6/10 trials in order to promote increased grasp strength and functional use of the L hand     Patient Goals   Patient goals : \"To use right hand\"      Electronically signed by:   KARRI Eller/HERMANN

## 2018-11-19 ENCOUNTER — HOSPITAL ENCOUNTER (OUTPATIENT)
Dept: PHYSICAL THERAPY | Age: 68
Setting detail: THERAPIES SERIES
Discharge: HOME | End: 2018-11-19
Payer: MEDICARE

## 2018-11-19 ENCOUNTER — HOSPITAL ENCOUNTER (OUTPATIENT)
Dept: OCCUPATIONAL THERAPY | Age: 68
Setting detail: THERAPIES SERIES
Discharge: HOME | End: 2018-11-19
Payer: MEDICARE

## 2018-11-19 PROCEDURE — 97110 THERAPEUTIC EXERCISES: CPT

## 2018-11-19 PROCEDURE — 97530 THERAPEUTIC ACTIVITIES: CPT

## 2018-11-19 PROCEDURE — 97112 NEUROMUSCULAR REEDUCATION: CPT

## 2018-11-19 NOTE — FLOWSHEET NOTE
Occupational Therapy Daily Treatment Note    Date:  2018    Patient Name:  Robina Moreno     :  1950  MRN: [id number]  Restrictions/Precautions: none. Has AFO for RLE   Medical/Treatment Diagnosis Information:  · L ischemic stroke affecting R side (L hand dominant)   · R side hemiparesis   Insurance/Certification information: Medicare    Physician Information: Dr. Esposito of care signed (Y/N):  Y  Visit# / total visits: / 48  Pain level: 0/10  OT G-codes (10/12)  Functional Assessment Tool Used: Box and Blocks  Score: 32  Functional Limitation: Carrying, moving and handling objects  Carrying, Moving and Handling Objects Current Status (): At least 60 percent but less than 80 percent impaired, limited or restricted  Carrying, Moving and Handling Objects Goal Status (): At least 60 percent but less than 80 percent impaired, limited or restricted          Progress Note: []  Yes  [x]  No  Next due by: Visit #33  (or )    Subjective: Reports continued difficulty with retrieving small objects when reaching out to the side. Therapeutic Activities:    Activity #1: Patient pat table in specified sequence x15 reps, cues provided to maintain palm contact with the table in order to promote increased finger extension. Only able to complete extension with digit 2 if palm remains in contact with the table. Activity #2: Patient completed shuffling of cards and underhand dealing in order to promote increased coordination and use of digits 3-5. Utilized cards to complete simple card game involving drawing cards from pile and turning over cards from tabletop to promote increased translation and fine motor coordination. Activity #3: Completed task-specific training with use of 16oz canned good to replace in upper cabinet of pantry. Required 5 min 28s to complete 100 repetitions.  Per protocol of task-specific training, this suggests that activity provides decreased challenge to patient and could be graded up for next session. Activity #4: Placed small pegs into pegboard x20 reps, placed a golf ball on top of peg x15 reps in order to simulate portion of IADL task (golf). Able to complete with minimal difficulty. Patient then instructed to simulate swing with use of golf club, completed x3 reps with SBA for dynamic balance. Therapeutic Exercise:   Exercise/Equipment  Resistance/Repetitions   Other comments   Edge of mat RUE shoulder abduction AROM 15 reps, 2# weight Increased difficulty maintaining elbow extension. Edge of mat RUE shoulder horizontal ab/adduction AROM 15 reps, 2# weight  Min/mod cues for maintaining elbow extension when nearing end range d/t increased fatigue; Min cues to avoid compensation at shoulder. Modalities:Attempt estim with novel pad placement to facilitate activation of lumbricals for increased digit extension, discontinued d/t report of increased discomfort along radial border of thumb. Repositioned pads to typical areas with muscle belly on the forearm; however, unable to achieve good response d/t equipment malfunction. E-stim discontinued at this time to complete     Timed Code Treatment Minutes:  57  Total Treatment Minutes:  57 min  16 TE, 41 TA    Assessment: Patient demonstrating good ability to complete multiple repetitions of functional task when directly facing, but reports increased fatigue when reaching outside of body/horizontally. Would benefit from grading up task-specific training to address this. Continues to require SBA for dynamic standing balance for IADL activities such as simulated golf. Would continue to benefit from OT services, cont POC. Performance deficits / Impairments: Decreased functional mobility ; Decreased ROM; Decreased strength;Decreased fine motor control;Decreased high-level IADLs;Decreased balance;Decreased coordination      Patient Education: IADL tasks, task-specific training - verb

## 2018-11-19 NOTE — FLOWSHEET NOTE
Physical Therapy Daily Treatment Note  Date:  2018    Patient Name:  Madelin Zimmerman    :  1950  MRN: [id number]  Restrictions/Precautions:  Diabetes, impulsive, right ankle and knee pre-existing conditions, wears glasses, R hip arthritis  Medical/Treatment Diagnosis Information:  · Diagnosis: CVA I63.40  · Treatment Diagnosis: balance problems 2/2 CVA  Insurance/Certification information:  PT Insurance Information: Medicare  Physician Information:  Referring Practitioner: Dr. Graham Dueñas MD Follow-up: Not known  Plan of care signed (Y/N):  Y  Visit# / total visits:   Pain level: 0/10     Progress Note: []  Yes  [x]  No  Next due by: Visit #32 or 18    Subjective: Pt reports he has a cold that won't go away. Denies falls. Pt reports he went to a party Sat. Night and stood for ~6 hours, was very tired the next day. Objective:  Pt continues with yogesh-gait pattern w/ decreased anjana, B LE ext rotated and requires SPC and R AFO. Improved R foot clearance without AFO.      Exercises: Session without AFO  Exercise/Equipment Resistance/Repetitions Other comments   Pec stretch 30 sec x 3 Supine over 1/2 foam roll w/ manual stretch to allow posterior shoulder relaxation   Prone hamstring RLE Only  5-7  X 2    90 degrees actively achieved by pt following stretching Fatigue at 5-7 reps each set  Hip IR/ER w/ assist  Ankle PF/DF assist to maintain knee flex   Prone quad self stretch w/ strap 3 x 30 sec Requires assist to get full  on belt    NMRStatic stance on foam w/ R weight shift 10x each direction Decreased R ankle reactions compared to L     Added horizontal and vertical head turns   Turning, stepping over objects, toe taps 4x each condition 3\" cones forwards, lateral, foam, 4\" step  Added dual task   Mult direction stepping 4 square step   CGA, + dual task   Toe taps to 4\" step 15x forward  15x lateral + dual task   Gait: SBA without AFO or AD 20ft x 6 Achieves R heel strike LOB

## 2018-11-21 ENCOUNTER — HOSPITAL ENCOUNTER (OUTPATIENT)
Dept: PHYSICAL THERAPY | Age: 68
Setting detail: THERAPIES SERIES
Discharge: HOME | End: 2018-11-21
Payer: MEDICARE

## 2018-11-21 ENCOUNTER — HOSPITAL ENCOUNTER (OUTPATIENT)
Dept: OCCUPATIONAL THERAPY | Age: 68
Setting detail: THERAPIES SERIES
Discharge: HOME | End: 2018-11-21
Payer: MEDICARE

## 2018-11-21 PROCEDURE — 97110 THERAPEUTIC EXERCISES: CPT

## 2018-11-21 PROCEDURE — 97530 THERAPEUTIC ACTIVITIES: CPT

## 2018-11-21 NOTE — FLOWSHEET NOTE
for Next Session:  RUE strengthening in unsupported sitting position to focus on elbow extension during exercises. Progress to RUE exercises in unsupported stand. Work on RUE fine/gross motor coordination and R hand strengthening, specifically of digits 3-5. Incorporate dynamic standing balance activities with functional activities of RUE. Trial e-stim to R 5th digit extensor as an adjunctive treatment intervention for strengthening during functional task practice    Goals  Short term goals  Time Frame for Short term goals: 4 weeks   Short term goal 1:  Patient will improve score on BBP to 35 blocks in one minute in order to demonstrate improved functional grasp/release of R hand  Short term goal 2: Pt will improve RUE AROM shoulder flexion (in unsupported sit) to 125 degrees to facilitate independence with reaching for items overhead  Short term goal 3:  Pt will demonstrate ability to complete extension of digit 5 from the tabletop on 3/10 trials in order to promote increased grasp strength and functional use of the L hand   Short term goal 4: Pt will complete finger-to-palm and palm-to-finger translation of x10  1/2\" foam squares in no more than 50s in order to improve functional use of digits 3-5    Long term goals  Time Frame for Long term goals : 8 weeks  Long term goal 1: Patient will improve score on BBP to 42 blocks in one minute in order to demonstrate improved functional grasp/release of R hand  Long term goal 2: Pt will complete finger-to-palm and palm-to-finger translation of x10  1/2\" foam squares in no more than 50s in order to improve functional use of digits 3-5  Long term goal 3: Pt will demonstrate ability to complete extension of digit 5 from the tabletop on 6/10 trials in order to promote increased grasp strength and functional use of the L hand     Patient Goals   Patient goals : \"To use right hand\"      Electronically signed by:   KARRI Eller/HERMANN

## 2018-11-21 NOTE — FLOWSHEET NOTE
less than 40% . Not met, progressing. Score 16/24  Short term goal 7: Pt to improve functional strength in LE evident by improving sit/ 30 seconds from 6 with use of hands to without UE support/use 30 seconds to complete >/=10reps with good descending control and without relying on the back of stabilized chair. Goal modified: Increase LE strength as evidenced by pt moving STS x 10 reps in 30 secs w/ or w/o use of UES- Progressing, 7 times. NT this session. Short term goal 8: Improve involved LE functional strength to >/3+ to4-/5 needed for gait mechanics, ease of heel off/toe off and initial heel strike on the involved LE . Progressing     Long term goals    Time Frame for Long term goals : 8 weeks  Long term goal 1: Pt to be indep with all functional transfers and mobility all surfaces >/=1000ft to return as safe community ambulation. Progressing  Long term goal 2: Pt to ascend/descend flight of stairs indep for return to prior level and for community/family outtings/gatherings. [de-identified]  Long term goal 3: Pt to dec impairment Per TEJADA Tinetti DGY to 0% impairment Progressing   Long term goal 4: pt remain fall free at home and in therapy and confidence with ADLs/IADLS mobility in home and community to >/=75%. Progressing, has not had falls in past 2 weeks. Plan:   [x] Continue per plan of care [] Alter current plan- UPOC to add 2x/week for 8 weeks. [] Plan of care initiated [] Hold pending MD visit [] Discharge      Plan for Next Session: Dynamic balance, ankle reactions,  progress dual tasks, progress single leg squats. G-Code noted on 11/5/2018:   PT G-Codes  Functional Assessment Tool Used: Carlton Garrison, TUG (w/ AFO and SPC), DGI  Score: 21/28, 45/56, 11 sec. 16/24  Functional Limitation: Mobility: Walking and moving around  Mobility: Walking and Moving Around Current Status ():  At least 20 percent but less than 40 percent impaired, limited or restricted  Mobility: Walking and Moving Around Goal Status (): 0 percent impaired, limited or restricted    G-Code noted on 10/10/2018:   PT G-Codes  Functional Assessment Tool Used: Tinetti, Vincent, TUG (w/ AFO and SPC)  Score: 19/28, 44/56, 17 sec  Functional Limitation: Mobility: Walking and moving around  Mobility: Walking and Moving Around Current Status (): At least 40 percent but less than 60 percent impaired, limited or restricted  Mobility: Walking and Moving Around Goal Status (): 0 percent impaired, limited or restricted    G-Code noted on 8/17/2018:   PT G-Codes  Functional Assessment Tool Used: Tinetti  Score: 1/24  Functional Limitation: Mobility: Walking and moving around  Mobility: Walking and Moving Around Current Status ():  At least 80 percent but less than 100 percent impaired, limited or restricted  Mobility: Walking and Moving Around Goal Status (): 0 percent impaired, limited or restricted    Electronically signed by:  SELVIN ScruggsT

## 2018-11-26 ENCOUNTER — APPOINTMENT (OUTPATIENT)
Dept: OCCUPATIONAL THERAPY | Age: 68
End: 2018-11-26
Payer: MEDICARE

## 2018-11-26 ENCOUNTER — HOSPITAL ENCOUNTER (OUTPATIENT)
Dept: PHYSICAL THERAPY | Age: 68
Setting detail: THERAPIES SERIES
Discharge: HOME | End: 2018-11-26
Payer: MEDICARE

## 2018-11-26 PROCEDURE — 97112 NEUROMUSCULAR REEDUCATION: CPT

## 2018-11-26 PROCEDURE — 97110 THERAPEUTIC EXERCISES: CPT

## 2018-11-26 NOTE — FLOWSHEET NOTE
Physical Therapy Daily Treatment Note  Date:  2018    Patient Name:  Guzman Lowry    :  1950  MRN: [id number]  Restrictions/Precautions:  Diabetes, impulsive, right ankle and knee pre-existing conditions, wears glasses, R hip arthritis  Medical/Treatment Diagnosis Information:  · Diagnosis: CVA I63.40  · Treatment Diagnosis: balance problems 2/2 CVA  Insurance/Certification information:  PT Insurance Information: Medicare  Physician Information:  Referring Practitioner: Dr. Tobi Diallo MD Follow-up: Not known  Plan of care signed (Y/N):  Y  Visit# / total visits:   Pain level: 0/10      Progress Note: []  Yes  [x]  No  Next due by: Visit #32 or 18    Subjective: Pt reports no new falls. Has been walking around neighborhood w/ cane, noticing a significant difference in his improved ability where he can walk much further where he could only walk 200' w/ a walker and was very winded 4 months ago. Objective:  Pt continues with yogesh-gait pattern w/ decreased anjana, B LE ext rotated and requires SPC and R AFO. Improved R foot clearance without AFO.       Exercises: Session without AFO  Exercise/Equipment Resistance/Repetitions Other comments   Nustep  Workload 5, 2 minutes  [id number], 2 min x 2 (HR 92bpm)   70+ spm, 1 min x 2  (80bpm) 8 min. total        Pec stretch 60 sec x 3 Supine over 1/2 foam roll w/ manual stretch to allow posterior shoulder relaxation   Gastroc stretch edge of step 60 sec x 3    Heel raises on floor  15 x 2 Pt able to self correct for R weight shift   Prone hamstring RLE Only  5  X 3    90 degrees actively achieved by pt following stretching Fatigue at 3 reps each set     Prone quad self stretch  3 x 30 sec manual    Partial squats 10x 1 VC for posterior weightsift   NMR     SLS //bars   No UE support, R LE improved to 6-7 sec   Resisted R hip flex  Red tband, 15 x 2  B UE support   Step ups on foam Alternating, 10x   Forward and lateral  No dual task illustrate improve functional mobility by dec time per TUG < or =13 secs Met, 11 sec   Short term goal 4: Pt to dec impairment per TEJADA to less than 40%. Goal achieved   Short term goal 5: Pt to dec impairment per Tinetti to less than 40% . Goal achieved   Short term goal 6: Pt to dec impairment per DGI to less than 40% . Not met, progressing. Score 16/24  Short term goal 7: Pt to improve functional strength in LE evident by improving sit/ 30 seconds from 6 with use of hands to without UE support/use 30 seconds to complete >/=10reps with good descending control and without relying on the back of stabilized chair. Goal modified: Increase LE strength as evidenced by pt moving STS x 10 reps in 30 secs w/ or w/o use of UES- Progressing, 7 times. NT this session. Short term goal 8: Improve involved LE functional strength to >/3+ to4-/5 needed for gait mechanics, ease of heel off/toe off and initial heel strike on the involved LE . Progressing     Long term goals    Time Frame for Long term goals : 8 weeks  Long term goal 1: Pt to be indep with all functional transfers and mobility all surfaces >/=1000ft to return as safe community ambulation. Progressing  Long term goal 2: Pt to ascend/descend flight of stairs indep for return to prior level and for community/family outtings/gatherings. [de-identified]  Long term goal 3: Pt to dec impairment Per TEJADA Tinetti DGY to 0% impairment Progressing   Long term goal 4: pt remain fall free at home and in therapy and confidence with ADLs/IADLS mobility in home and community to >/=75%. Progressing, has not had falls in past 2 weeks. Plan:   [x] Continue per plan of care [] Alter current plan- UPOC to add 2x/week for 8 weeks. [] Plan of care initiated [] Hold pending MD visit [] Discharge      Plan for Next Session: Dynamic balance, ankle reactions,  progress dual tasks, progress single leg squats.      G-Code noted on 11/5/2018:   PT G-Codes  Functional Assessment Tool

## 2018-11-28 ENCOUNTER — HOSPITAL ENCOUNTER (OUTPATIENT)
Dept: OCCUPATIONAL THERAPY | Age: 68
Setting detail: THERAPIES SERIES
Discharge: HOME | End: 2018-11-28
Payer: MEDICARE

## 2018-11-28 ENCOUNTER — HOSPITAL ENCOUNTER (OUTPATIENT)
Dept: PHYSICAL THERAPY | Age: 68
Setting detail: THERAPIES SERIES
Discharge: HOME | End: 2018-11-28
Payer: MEDICARE

## 2018-11-28 PROCEDURE — G8988 SELF CARE GOAL STATUS: HCPCS

## 2018-11-28 PROCEDURE — G8986 CARRY D/C STATUS: HCPCS

## 2018-11-28 PROCEDURE — G8989 SELF CARE D/C STATUS: HCPCS

## 2018-11-28 PROCEDURE — G8985 CARRY GOAL STATUS: HCPCS

## 2018-11-28 PROCEDURE — 97110 THERAPEUTIC EXERCISES: CPT

## 2018-11-28 PROCEDURE — 97530 THERAPEUTIC ACTIVITIES: CPT

## 2018-11-28 PROCEDURE — 97112 NEUROMUSCULAR REEDUCATION: CPT

## 2018-11-28 PROCEDURE — G8987 SELF CARE CURRENT STATUS: HCPCS

## 2018-11-28 PROCEDURE — G8984 CARRY CURRENT STATUS: HCPCS

## 2018-11-28 NOTE — FLOWSHEET NOTE
Follow-up: [x] Yes  [] No    Goals:  Short term goals  Time Frame for Short term goals: 4 weeks  Short term goal 1: Pt to be indep with HEP to max rehab potential. Goal achieved and d/cd  Short term goal 2: Pt to illustrate good to normal sitting balance for ease of transfers and bed mobility skills/ADLS. Goal achieved and d/cd  Short term goal 3: Pt to illustrate improve functional mobility by dec time per TUG < or =13 secs Met, 11 sec   Short term goal 4: Pt to dec impairment per TEJADA to less than 40%. Goal achieved   Short term goal 5: Pt to dec impairment per Tinetti to less than 40% . Goal achieved   Short term goal 6: Pt to dec impairment per DGI to less than 40% . Not met, progressing. Score 16/24  Short term goal 7: Pt to improve functional strength in LE evident by improving sit/ 30 seconds from 6 with use of hands to without UE support/use 30 seconds to complete >/=10reps with good descending control and without relying on the back of stabilized chair. Goal modified: Increase LE strength as evidenced by pt moving STS x 10 reps in 30 secs w/ or w/o use of UES- Progressing, 7 times. NT this session. Short term goal 8: Improve involved LE functional strength to >/3+ to4-/5 needed for gait mechanics, ease of heel off/toe off and initial heel strike on the involved LE . Progressing     Long term goals    Time Frame for Long term goals : 8 weeks  Long term goal 1: Pt to be indep with all functional transfers and mobility all surfaces >/=1000ft to return as safe community ambulation. Progressing  Long term goal 2: Pt to ascend/descend flight of stairs indep for return to prior level and for community/family outtings/gatherings. [de-identified]  Long term goal 3: Pt to dec impairment Per TEJADA Tinetti DGY to 0% impairment Progressing   Long term goal 4: pt remain fall free at home and in therapy and confidence with ADLs/IADLS mobility in home and community to >/=75%.  Progressing, has not had falls in past 2

## 2018-11-28 NOTE — FLOWSHEET NOTE
of home exercise program and ability to carry-over, including e-stim for EDM activation, in-hand manipulation tasks and AROM in unsupported sitting. Patient agreeable to d/c OT services at this time; educated on ability to request new orders and re-consult OT services if demonstrating a decline in function, strength and coordination. Performance deficits / Impairments: Decreased functional mobility ; Decreased ROM; Decreased strength;Decreased fine motor control;Decreased high-level IADLs;Decreased balance;Decreased coordination      Patient Education: HEP, reconsulting OT if experiencing a functional decline - verb understanding    Treatment/Activity Tolerance:     [x]  Patient tolerated treatment well []  Patient limited by fatigue    []  Patient limited by pain []  Patient limited by other medical complications   []  Other:     Prognosis: [x]  Good []  Fair  []  Poor    Patient Requires Follow-up:  [x]  Yes  []  No    Plan: []  Continue per plan of care []  Alter current plan (see comments)   []  Plan of care initiated []  Hold pending MD visit [x]  Discharge    Plan for Next Session:  Discharge OP OT services    Goals  Short term goals  Time Frame for Short term goals: 4 weeks   Short term goal 1:  Patient will improve score on BBP to 35 blocks in one minute in order to demonstrate improved functional grasp/release of R hand -goal met 11/28  Short term goal 2: Pt will improve RUE AROM shoulder flexion (in unsupported sit) to 125 degrees to facilitate independence with reaching for items overhead - goal met 11/28  Short term goal 3:  Pt will demonstrate ability to complete extension of digit 5 from the tabletop on 3/10 trials in order to promote increased grasp strength and functional use of the L hand - goal met 11/28  Short term goal 4: Pt will complete finger-to-palm and palm-to-finger translation of x10  1/2\" foam squares in no more than 50s in order to improve functional use of digits 3-5 - goal met

## 2018-11-30 ENCOUNTER — APPOINTMENT (OUTPATIENT)
Dept: OCCUPATIONAL THERAPY | Age: 68
End: 2018-11-30
Payer: MEDICARE

## 2018-12-05 ENCOUNTER — APPOINTMENT (OUTPATIENT)
Dept: PHYSICAL THERAPY | Age: 68
End: 2018-12-05
Payer: MEDICARE

## 2018-12-10 ENCOUNTER — HOSPITAL ENCOUNTER (OUTPATIENT)
Dept: PHYSICAL THERAPY | Age: 68
Setting detail: THERAPIES SERIES
Discharge: HOME | End: 2018-12-10
Payer: MEDICARE

## 2018-12-10 PROCEDURE — G8978 MOBILITY CURRENT STATUS: HCPCS

## 2018-12-10 PROCEDURE — G8979 MOBILITY GOAL STATUS: HCPCS

## 2018-12-10 PROCEDURE — 97116 GAIT TRAINING THERAPY: CPT

## 2018-12-10 PROCEDURE — 97110 THERAPEUTIC EXERCISES: CPT

## 2018-12-12 ENCOUNTER — HOSPITAL ENCOUNTER (OUTPATIENT)
Dept: PHYSICAL THERAPY | Age: 68
Setting detail: THERAPIES SERIES
Discharge: HOME | End: 2018-12-12
Payer: MEDICARE

## 2018-12-12 PROCEDURE — 97116 GAIT TRAINING THERAPY: CPT

## 2018-12-12 PROCEDURE — 97110 THERAPEUTIC EXERCISES: CPT

## 2018-12-12 NOTE — FLOWSHEET NOTE
With ADLs specific putting on socks and shoes. 12/10/18-pt c/c balance, difficulty with curb/ramps, stairs, hills which affect his daily living./mobility inh home and limit his community ambulation. Pt reports using SPC in community, reports weaning from [facility] in home. Pt reports not seen last week due to limited availability with his schedule. Pt reports difficulty getting up/down from floor which he noticed when he fell last week. Pt reported falling last week when he lost his footing, tripped resulted in supinated the right foot and fell to the right. He reports difficulty getting up from the floor after this fall, inc time and heavily relied on his sofa and wife. \"I think back before all the strokes and I felt worse, I had more pain and more neuropathy like pain. I think the exercise are continuously helping me feel better and get more indep. I want to not have to rely on my cane as much and to not fall\" prior level- pain in shiva knees right hip, was indep with home +community mobility and situations. Currently requires assist for transportation intermittent as needed supervision at home and community outings and requires use of assistive devices for walking and AFO. Pt also interested and goal in getting stronger to get up/down from the floor easier to play with grandchildren as he watches them daily for a few hours. Pt wishing to have more PT to improve Right sided strength to wean from cane and dec the need for supervision in home and community. Prior level- min to no pain, indep with all self care, home mobility and situations, community outtings/distances and situations, able to get up/down from floor without assistive devices. Objective:  12/12/18-inc step length RLE ( which makes for inc WB on the LLE), shorter step length LLE, asymmetrical stance and step length, trendelenburg gait, SPC in the LUE, modified indep noted with home distances with and without SPC due to balance concerns.  Pt comes to

## 2018-12-17 ENCOUNTER — HOSPITAL ENCOUNTER (OUTPATIENT)
Dept: PHYSICAL THERAPY | Age: 68
Setting detail: THERAPIES SERIES
Discharge: HOME | End: 2018-12-17
Payer: MEDICARE

## 2018-12-17 PROCEDURE — 97110 THERAPEUTIC EXERCISES: CPT

## 2018-12-17 NOTE — FLOWSHEET NOTE
findings, requires UE use today, multiple attempts without UE needed to complete. 8 reps. Short term goal 8: Improve involved LE functional strength to >/3+ to4-/5 needed for gait mechanics, ease of heel off/toe off and initial heel strike on the involved LE . 12/10/18-not formally tested    Long term goals    Time Frame for Long term goals : 8 weeks-NOT MET  Long term goal 1: Pt to be indep with all functional transfers and mobility all surfaces >/=1000ft to return as safe community ambulation. Long term goal 2: Pt to ascend/descend flight of stairs indep for return to prior level and for community/family outtings/gatherings. Long term goal 3: Pt to dec impairment Per TEJADA Tinetti DGY to 0% impairment   Long term goal 4: pt remain fall free at home and in therapy and confidence with ADLs/IADLS mobility in home and community to >/=75%. 12/10/18-Not met, fell in home without SPC or AFO last week, met for confidence. Plan:   [x] Requesting Extension of Plan of Care Rec 2 days a week for 8 weeks, awaiting MD signature for request to continue with POC. Plan for Next Session:   Dynamic balance and improving balance reactions to improve gait in home and community, address ankle reactions, HEP progression to stretch and strengthen core and LE to improve balance, progress gait activities with cognitive +dual tasks, progress single leg squats and further ex/activities to improve weight acceptance on the RLE for improving functional tranfers and mobility. G-Code noted on 12/10/2018:   PT G-Codes  Functional Assessment Tool Used: Tinetti  Functional Limitation: Mobility: Walking and moving around  Mobility: Walking and Moving Around Current Status ():  At least 20 percent but less than 40 percent impaired, limited or restricted  Mobility: Walking and Moving Around Goal Status (): 0 percent impaired, limited or restricted    G-Code noted on 11/5/2018:   PT G-Codes  Functional Assessment Tool Used:

## 2018-12-19 ENCOUNTER — HOSPITAL ENCOUNTER (OUTPATIENT)
Dept: PHYSICAL THERAPY | Age: 68
Setting detail: THERAPIES SERIES
Discharge: HOME | End: 2018-12-19
Payer: MEDICARE

## 2018-12-19 PROCEDURE — 97116 GAIT TRAINING THERAPY: CPT

## 2018-12-19 PROCEDURE — 97110 THERAPEUTIC EXERCISES: CPT

## 2018-12-19 NOTE — FLOWSHEET NOTE
Objective:  12/12/18-inc step length RLE ( which makes for inc WB on the LLE), shorter step length LLE, asymmetrical stance and step length, trendelenburg gait, SPC in the LUE, modified indep noted with home distances with and without SPC due to balance concerns. Pt comes to therapy wearing AFO RLE. Diminished RLE funcitonal strength needed for toe off/heel off and inc weight acceptance onto the invlved RLE which affects balance and gait- needs ongoing skilled PT to address and max indep safety with mobility and lower fall risk. 12/10/18-Function deficits/limitations observed: Sit to from stand, mod to mildly inc Weight bearing and acceptance on the noninvolved LLE, multiple attempts to stand with UE crossed and close supervision-to illustrate diminished functional strength. Rapid alternative movements UE and LE significantly delayed in the Right side- which carryovers over to coordination/dual task/balance reactions with functional activities and gait. Balance-Slowed/diminished stand balance reactions-sitting balance normalizing. Standing static balance- fair plus to good/normal, standing dynamic balance- fair to fair plus/normal  TEJADA- 45/56, low fall risk still has difficulty with single leg stance, tandem, alternate foot stepping, stair/climbing, 360 turns, picking up an object from the floor requires supervision, use of hands needed with transfers. 1-19% impairment. Tinetti 12/16(balance) +10(gait), 12/10/18-22 score improved from 21 (retest date:11/5/18)  20-39% impairment CJ, moderate fall risk. Dynamic gait index (DGI)- 16/24, 20-39% impairment, moderate fall risk. Difficulty with directional changes ( tends to fall to the right with significantly slowed balance reactions but no LOB or PT need for fall preventions), stepping over obstacle requires slowing down and step adjustment for clearance. Slowed gait speed with walking around obstacles.  Pt must use handrail for balance with stair to less than 40%. 12/10/18-Goal achieved    Short term goal 5: Pt to dec impairment per Tinetti to less than 40% . 12/10/18-Goal achieved   Short term goal 6: Pt to dec impairment per DGI to less than 40% . 12/10/18- Not met, progressing. Score 16/24 needs further skilled PT to met therapy and pt goals and lower fall risk  Short term goal 7: Pt to improve functional strength in LE evident by improving sit/ 30 seconds from 6 with use of hands to without UE support/use 30 seconds to complete >/=10reps with good descending control and without relying on the back of stabilized chair. Goal modified: Increase LE strength as evidenced by pt moving STS x 10 reps in 30 secs w/ or w/o use of UES-12/10/18-not met, inconsistent findings, requires UE use today, multiple attempts without UE needed to complete. 8 reps. Short term goal 8: Improve involved LE functional strength to >/3+ to4-/5 needed for gait mechanics, ease of heel off/toe off and initial heel strike on the involved LE . 12/10/18-not formally tested    Long term goals    Time Frame for Long term goals : 8 weeks-NOT MET  Long term goal 1: Pt to be indep with all functional transfers and mobility all surfaces >/=1000ft to return as safe community ambulation. Long term goal 2: Pt to ascend/descend flight of stairs indep for return to prior level and for community/family outtings/gatherings. Long term goal 3: Pt to dec impairment Per TEJADA Tinetti DGY to 0% impairment   Long term goal 4: pt remain fall free at home and in therapy and confidence with ADLs/IADLS mobility in home and community to >/=75%. 12/10/18-Not met, fell in home without SPC or AFO last week, met for confidence. Plan:   [x] Requesting Extension of Plan of Care Rec 2 days a week for 8 weeks, awaiting MD signature for request to continue with POC.       Plan for Next Session:   Dynamic balance and improving balance reactions to improve gait in home and community, address ankle reactions, HEP progression to stretch and strengthen core and LE to improve balance, progress gait activities with cognitive +dual tasks, progress single leg squats and further ex/activities to improve weight acceptance on the RLE for improving functional tranfers and mobility. G-Code noted on 12/10/2018:   PT G-Codes  Functional Assessment Tool Used: Tinetti  Functional Limitation: Mobility: Walking and moving around  Mobility: Walking and Moving Around Current Status (): At least 20 percent but less than 40 percent impaired, limited or restricted  Mobility: Walking and Moving Around Goal Status (): 0 percent impaired, limited or restricted    G-Code noted on 11/5/2018:   PT G-Codes  Functional Assessment Tool Used: Tinetti, Vincent, TUG (w/ AFO and SPC), DGI  Score: 21/28, 45/56, 11 sec. 16/24  Functional Limitation: Mobility: Walking and moving around  Mobility: Walking and Moving Around Current Status (): At least 20 percent but less than 40 percent impaired, limited or restricted  Mobility: Walking and Moving Around Goal Status (): 0 percent impaired, limited or restricted    G-Code noted on 10/10/2018:   PT G-Codes  Functional Assessment Tool Used: Tinetti, Vincent, TUG (w/ AFO and SPC)  Score: 19/28, 44/56, 17 sec  Functional Limitation: Mobility: Walking and moving around  Mobility: Walking and Moving Around Current Status (): At least 40 percent but less than 60 percent impaired, limited or restricted  Mobility: Walking and Moving Around Goal Status (): 0 percent impaired, limited or restricted    G-Code noted on 8/17/2018:   PT G-Codes  Functional Assessment Tool Used: Tinetti  Score: 1/24  Functional Limitation: Mobility: Walking and moving around  Mobility: Walking and Moving Around Current Status ():  At least 80 percent but less than 100 percent impaired, limited or restricted  Mobility: Walking and Moving Around Goal Status (): 0 percent impaired, limited or restricted    Electronically signed by:  Vahe Cisse DPT

## 2018-12-26 ENCOUNTER — HOSPITAL ENCOUNTER (OUTPATIENT)
Dept: PHYSICAL THERAPY | Age: 68
Setting detail: THERAPIES SERIES
Discharge: HOME | End: 2018-12-26
Payer: MEDICARE

## 2018-12-26 PROCEDURE — 97530 THERAPEUTIC ACTIVITIES: CPT

## 2018-12-26 PROCEDURE — 97110 THERAPEUTIC EXERCISES: CPT

## 2018-12-26 NOTE — FLOWSHEET NOTE
down B LE + R UE   Quad stretch in prone, gastroc stretching, lunge stretch for hip flexor on step, seated hamstring stretch w/ strap  30 seconds x3 R LE    Prone hamstring curls 5x 2 R only  3 x w/ hip int/ext rotation Limited by muscle fatigue, active assisted on final reps   . STEP UP   Alt 10 REPS x2 sets, 6\" step RUE as needed Resisted hip flexor from lunge position 10x 2, parallel bars w/ red tband B UE supportR toe taps 10x, 6\" step No UE support. Improved foot clearance and neutral hip rotation following resisted hip flex exercise. Education/Modalities:     HEP:  1. 1/2 bridge without use of the AFO. The LLE is in figure \"4\" on the R LE. Pt instructed to do the ex to both LES. A bridge is performed in which ankle PF/ DF / and neutral are performed  with the supporting LE resulting in a slow count of \"3\" for  increasing the R ankle strategies. Yulisa 5 reps     2. PNF pattern with R  LE off of the table and crossing over the opposing LE(focus on leading with the toes)   3. Prone position: alternating hamstring curls with focus on DF when performing   ( Pt is now yulisa 3/4 range with the RLE with  this ex)  4. 4 way R ankle, when can do 3 x 20 will add tband resistance  5. Sit to stand x 10 reps without UE, daily   6. Lunge stretch (front foot on step) for hip flexor and gastroc and R shoulder horizontal adduction stretch for posterior proximal UE    Timed Code Treatment Minutes:  30 TE; 25 TA   Total Treatment Minutes:  60    Treatment/Activity Tolerance:  [x] Patient tolerated treatment well [] Patient limited by fatigue  [] Patient limited by pain  [] Patient limited by other medical complications  [] Other:     Assessment: Pt continues to reports issues at home w/ falling, usually ant or posterior due to delayed/absent fall reactions. Pt issued gentle stretches to target strain in R posterior shoulder due recent near falls.  Pt demos improved proximal R LE strength, but LE is very easily fatigued and

## 2018-12-31 ENCOUNTER — APPOINTMENT (OUTPATIENT)
Dept: PHYSICAL THERAPY | Age: 68
End: 2018-12-31
Payer: MEDICARE

## 2019-01-02 ENCOUNTER — HOSPITAL ENCOUNTER (OUTPATIENT)
Dept: PHYSICAL THERAPY | Age: 69
Setting detail: THERAPIES SERIES
Discharge: HOME | End: 2019-01-02
Payer: MEDICARE

## 2019-01-02 PROCEDURE — 97140 MANUAL THERAPY 1/> REGIONS: CPT

## 2019-01-02 PROCEDURE — 97110 THERAPEUTIC EXERCISES: CPT

## 2019-01-02 PROCEDURE — 97112 NEUROMUSCULAR REEDUCATION: CPT

## 2019-01-07 ENCOUNTER — HOSPITAL ENCOUNTER (OUTPATIENT)
Dept: PHYSICAL THERAPY | Age: 69
Setting detail: THERAPIES SERIES
Discharge: HOME | End: 2019-01-07
Payer: MEDICARE

## 2019-01-07 PROCEDURE — 97110 THERAPEUTIC EXERCISES: CPT

## 2019-01-07 PROCEDURE — 97112 NEUROMUSCULAR REEDUCATION: CPT

## 2019-01-09 ENCOUNTER — HOSPITAL ENCOUNTER (OUTPATIENT)
Dept: PHYSICAL THERAPY | Age: 69
Setting detail: THERAPIES SERIES
Discharge: HOME | End: 2019-01-09
Payer: MEDICARE

## 2019-01-09 PROCEDURE — 97112 NEUROMUSCULAR REEDUCATION: CPT

## 2019-01-09 PROCEDURE — 97110 THERAPEUTIC EXERCISES: CPT

## 2019-01-14 ENCOUNTER — HOSPITAL ENCOUNTER (OUTPATIENT)
Dept: PHYSICAL THERAPY | Age: 69
Setting detail: THERAPIES SERIES
Discharge: HOME | End: 2019-01-14
Payer: MEDICARE

## 2019-01-14 PROCEDURE — 97116 GAIT TRAINING THERAPY: CPT

## 2019-01-14 PROCEDURE — 97112 NEUROMUSCULAR REEDUCATION: CPT

## 2019-01-14 PROCEDURE — 97110 THERAPEUTIC EXERCISES: CPT

## 2019-01-16 ENCOUNTER — HOSPITAL ENCOUNTER (OUTPATIENT)
Dept: PHYSICAL THERAPY | Age: 69
Setting detail: THERAPIES SERIES
Discharge: HOME | End: 2019-01-16
Payer: MEDICARE

## 2019-01-16 PROCEDURE — 97116 GAIT TRAINING THERAPY: CPT

## 2019-01-16 PROCEDURE — 97110 THERAPEUTIC EXERCISES: CPT

## 2019-01-21 ENCOUNTER — HOSPITAL ENCOUNTER (OUTPATIENT)
Dept: PHYSICAL THERAPY | Age: 69
Setting detail: THERAPIES SERIES
Discharge: HOME | End: 2019-01-21
Payer: MEDICARE

## 2019-01-21 PROCEDURE — 97116 GAIT TRAINING THERAPY: CPT

## 2019-01-21 PROCEDURE — 97112 NEUROMUSCULAR REEDUCATION: CPT

## 2019-01-21 PROCEDURE — 97110 THERAPEUTIC EXERCISES: CPT

## 2019-01-23 ENCOUNTER — HOSPITAL ENCOUNTER (OUTPATIENT)
Dept: PHYSICAL THERAPY | Age: 69
Setting detail: THERAPIES SERIES
Discharge: HOME | End: 2019-01-23
Payer: MEDICARE

## 2019-01-23 PROCEDURE — 97116 GAIT TRAINING THERAPY: CPT

## 2019-01-23 PROCEDURE — 97110 THERAPEUTIC EXERCISES: CPT

## 2019-01-23 PROCEDURE — 97112 NEUROMUSCULAR REEDUCATION: CPT

## 2019-01-28 ENCOUNTER — HOSPITAL ENCOUNTER (OUTPATIENT)
Dept: PHYSICAL THERAPY | Age: 69
Setting detail: THERAPIES SERIES
Discharge: HOME | End: 2019-01-28
Payer: MEDICARE

## 2019-01-28 PROCEDURE — 97110 THERAPEUTIC EXERCISES: CPT

## 2019-01-28 PROCEDURE — 97116 GAIT TRAINING THERAPY: CPT

## 2019-01-28 PROCEDURE — 97112 NEUROMUSCULAR REEDUCATION: CPT

## 2019-01-30 ENCOUNTER — HOSPITAL ENCOUNTER (OUTPATIENT)
Dept: PHYSICAL THERAPY | Age: 69
Setting detail: THERAPIES SERIES
Discharge: HOME | End: 2019-01-30
Payer: MEDICARE

## 2019-01-30 PROCEDURE — 97116 GAIT TRAINING THERAPY: CPT

## 2019-01-30 PROCEDURE — 97110 THERAPEUTIC EXERCISES: CPT

## 2019-01-30 PROCEDURE — 97112 NEUROMUSCULAR REEDUCATION: CPT

## 2019-02-04 ENCOUNTER — HOSPITAL ENCOUNTER (OUTPATIENT)
Dept: PHYSICAL THERAPY | Age: 69
Setting detail: THERAPIES SERIES
Discharge: HOME | End: 2019-02-04
Payer: MEDICARE

## 2019-02-04 PROCEDURE — 97116 GAIT TRAINING THERAPY: CPT

## 2019-02-04 PROCEDURE — 97112 NEUROMUSCULAR REEDUCATION: CPT

## 2019-02-04 PROCEDURE — 97110 THERAPEUTIC EXERCISES: CPT

## 2019-02-07 ENCOUNTER — HOSPITAL ENCOUNTER (OUTPATIENT)
Dept: PHYSICAL THERAPY | Age: 69
Setting detail: THERAPIES SERIES
Discharge: HOME | End: 2019-02-07
Payer: MEDICARE

## 2019-02-07 PROCEDURE — 97112 NEUROMUSCULAR REEDUCATION: CPT

## 2019-02-07 PROCEDURE — 97116 GAIT TRAINING THERAPY: CPT

## 2019-02-07 PROCEDURE — 97110 THERAPEUTIC EXERCISES: CPT

## 2019-02-11 ENCOUNTER — HOSPITAL ENCOUNTER (OUTPATIENT)
Dept: PHYSICAL THERAPY | Age: 69
Setting detail: THERAPIES SERIES
Discharge: HOME | End: 2019-02-11
Payer: MEDICARE

## 2019-02-11 PROCEDURE — 97110 THERAPEUTIC EXERCISES: CPT

## 2019-02-11 PROCEDURE — 97116 GAIT TRAINING THERAPY: CPT

## 2019-02-13 ENCOUNTER — HOSPITAL ENCOUNTER (OUTPATIENT)
Dept: PHYSICAL THERAPY | Age: 69
Setting detail: THERAPIES SERIES
Discharge: HOME | End: 2019-02-13
Payer: MEDICARE

## 2019-02-13 PROCEDURE — 97110 THERAPEUTIC EXERCISES: CPT

## 2019-02-13 PROCEDURE — 97116 GAIT TRAINING THERAPY: CPT

## 2019-02-13 PROCEDURE — 97112 NEUROMUSCULAR REEDUCATION: CPT

## 2019-02-18 ENCOUNTER — HOSPITAL ENCOUNTER (OUTPATIENT)
Dept: PHYSICAL THERAPY | Age: 69
Setting detail: THERAPIES SERIES
Discharge: HOME | End: 2019-02-18
Payer: MEDICARE

## 2019-02-18 PROCEDURE — 97110 THERAPEUTIC EXERCISES: CPT

## 2019-02-18 PROCEDURE — 97116 GAIT TRAINING THERAPY: CPT

## 2019-02-18 PROCEDURE — 97112 NEUROMUSCULAR REEDUCATION: CPT

## 2019-02-20 ENCOUNTER — HOSPITAL ENCOUNTER (OUTPATIENT)
Dept: PHYSICAL THERAPY | Age: 69
Setting detail: THERAPIES SERIES
Discharge: HOME | End: 2019-02-20
Payer: MEDICARE

## 2019-02-20 PROCEDURE — 97110 THERAPEUTIC EXERCISES: CPT

## 2019-02-20 PROCEDURE — 97112 NEUROMUSCULAR REEDUCATION: CPT

## 2019-02-20 PROCEDURE — 97116 GAIT TRAINING THERAPY: CPT

## 2019-02-25 ENCOUNTER — HOSPITAL ENCOUNTER (OUTPATIENT)
Dept: PHYSICAL THERAPY | Age: 69
Setting detail: THERAPIES SERIES
Discharge: HOME | End: 2019-02-25
Payer: MEDICARE

## 2019-02-25 PROCEDURE — 97116 GAIT TRAINING THERAPY: CPT

## 2019-02-25 PROCEDURE — 97110 THERAPEUTIC EXERCISES: CPT

## 2019-02-25 PROCEDURE — 97112 NEUROMUSCULAR REEDUCATION: CPT

## 2019-02-27 ENCOUNTER — HOSPITAL ENCOUNTER (OUTPATIENT)
Dept: PHYSICAL THERAPY | Age: 69
Setting detail: THERAPIES SERIES
Discharge: HOME | End: 2019-02-27
Payer: MEDICARE

## 2019-02-27 PROCEDURE — 97110 THERAPEUTIC EXERCISES: CPT

## 2019-02-27 PROCEDURE — 97112 NEUROMUSCULAR REEDUCATION: CPT

## 2019-02-27 PROCEDURE — 97116 GAIT TRAINING THERAPY: CPT

## 2019-03-05 ENCOUNTER — HOSPITAL ENCOUNTER (OUTPATIENT)
Dept: PHYSICAL THERAPY | Age: 69
Setting detail: THERAPIES SERIES
Discharge: HOME | End: 2019-03-05
Payer: MEDICARE

## 2019-03-05 PROCEDURE — 97110 THERAPEUTIC EXERCISES: CPT

## 2019-03-05 PROCEDURE — 97116 GAIT TRAINING THERAPY: CPT

## 2019-03-05 PROCEDURE — 97112 NEUROMUSCULAR REEDUCATION: CPT

## 2019-03-14 ENCOUNTER — APPOINTMENT (OUTPATIENT)
Dept: PHYSICAL THERAPY | Age: 69
End: 2019-03-14
Payer: MEDICARE

## 2019-03-14 ENCOUNTER — HOSPITAL ENCOUNTER (OUTPATIENT)
Dept: PHYSICAL THERAPY | Age: 69
Setting detail: THERAPIES SERIES
Discharge: HOME | End: 2019-03-14
Payer: MEDICARE

## 2019-03-14 PROCEDURE — 97110 THERAPEUTIC EXERCISES: CPT

## 2019-03-19 ENCOUNTER — HOSPITAL ENCOUNTER (OUTPATIENT)
Dept: PHYSICAL THERAPY | Age: 69
Setting detail: THERAPIES SERIES
Discharge: HOME | End: 2019-03-19
Payer: MEDICARE

## 2019-03-19 PROCEDURE — 97112 NEUROMUSCULAR REEDUCATION: CPT

## 2019-03-19 PROCEDURE — 97530 THERAPEUTIC ACTIVITIES: CPT

## 2019-03-21 ENCOUNTER — HOSPITAL ENCOUNTER (OUTPATIENT)
Dept: PHYSICAL THERAPY | Age: 69
Setting detail: THERAPIES SERIES
Discharge: HOME | End: 2019-03-21
Payer: MEDICARE

## 2019-03-21 PROCEDURE — 97110 THERAPEUTIC EXERCISES: CPT

## 2019-03-21 PROCEDURE — 97116 GAIT TRAINING THERAPY: CPT

## 2019-03-21 PROCEDURE — 97112 NEUROMUSCULAR REEDUCATION: CPT

## 2019-03-26 ENCOUNTER — HOSPITAL ENCOUNTER (OUTPATIENT)
Dept: PHYSICAL THERAPY | Age: 69
Setting detail: THERAPIES SERIES
Discharge: HOME | End: 2019-03-26
Payer: MEDICARE

## 2019-03-26 PROCEDURE — 97112 NEUROMUSCULAR REEDUCATION: CPT

## 2019-03-26 PROCEDURE — 97110 THERAPEUTIC EXERCISES: CPT

## 2019-03-28 ENCOUNTER — HOSPITAL ENCOUNTER (OUTPATIENT)
Dept: PHYSICAL THERAPY | Age: 69
Setting detail: THERAPIES SERIES
Discharge: HOME | End: 2019-03-28
Payer: MEDICARE

## 2019-03-28 PROCEDURE — 97110 THERAPEUTIC EXERCISES: CPT

## 2019-03-28 PROCEDURE — 97116 GAIT TRAINING THERAPY: CPT

## 2019-04-02 ENCOUNTER — HOSPITAL ENCOUNTER (OUTPATIENT)
Dept: PHYSICAL THERAPY | Age: 69
Setting detail: THERAPIES SERIES
Discharge: HOME | End: 2019-04-02
Payer: MEDICARE

## 2019-04-02 PROCEDURE — 97110 THERAPEUTIC EXERCISES: CPT

## 2019-04-02 PROCEDURE — 97116 GAIT TRAINING THERAPY: CPT

## 2019-04-02 NOTE — FLOWSHEET NOTE
Physical Therapy Daily Treatment Note  Date:  2019  Patient Name:  Geovanni Sung      :  1950    MRN: [id number]  Restrictions/Precautions:  Diabetes, ,right ankle and knee pre-existing arthritis, wears glasses  Medical/Treatment Diagnosis Information:  · Medical Diagnosis: CVA I63.40  · Treatment Diagnosis: balance problems 2/2 CVA  Insurance/Certification information:  PT Insurance Information: Medicare  Physician Information:  Referring Practitioner: Dr. Nicole Graf MD Follow-up: Not known   Plan of care signed (Y/N):  Y  Visit# / total visits: 60/64      Pain level: 0/10 chronic posterior R upper arm, tightness. Progress Note: []  Yes  [x]  No   Next Progress Note due by:  Visit # 64 or 19     Subjective: Pt reports his ankles have been very puffy the last few days. Denies diet or activity changes. Objective:  Pt drove self to PT today alone, still reports a little difficulty grading pressure on accelerator w/ R foot. Pt amb w/ L AFO and SPC. [facility] returns for tx in May. Has drivers [employer] tomorrow and orthopedic doctor appt on Fri for knees. [facility] as [facility] and agreed upon. Tests and measures:         Exercises: Session without AFO or AD  Exercise/Equipment Resistance/Repetitions Other comments   ,gastroc stretching on edge of step, lunge stretch for hip flexor on step,  30 sec x 2    Step Up     B leading 32 reps x 1, 8\" step  Descending 20x backwards, 12x forwards. no UE support. Increased corrective steps for LOB post when stepping back. Limited descending w/ L LE due to pain/giving way in anterior R knee   NMR     Lunge Forward/lateral onto ball side BOSU 10 reps x 1 sets each LE inside // bars UE intermittent need for balance . Added UE reach forward swing back exaggerted push back to challenge SLS and posterior reactions.  Pt w/ difficulty managing combined movement use of nustep, treadmill, recumbant bike, sci-fit for cardio. Trialed elliptical, however due to instability during entering/exiting machine do not recommend at this time. Recommended use of leg press only if sled type of seat vs upright seat, R LE only seated hamstring curl, R LE seated knee ext if not having any knee joint pain, seated low row machine, standing lat pull down if able to do without shoulder/neck pain. Pt able to demo understanding. He will require educ from fitness center on specific set up/adjustment of their equipment to ensure pt safety due to variability in machines. Timed Code Treatment Minutes:  GT 46, TE 10   Total Treatment Minutes:  56    Treatment/Activity Tolerance:  [x] Patient tolerated treatment well [] Patient limited by fatigue  [] Patient limited by pain  [] Patient limited by other medical complications  [] Other:     Assessment: Pt improved anjana stepping over objects w/ cueing which improve foot clearance. He was still challenged and required assist to negotiate obstacles when stepping sideways or backwards. Pt's performance decreased w/ increased distractions. Pt will benefit from ongoing skilled PT to address and max indep safety with mobility and lower fall risk. Prognosis: [x] Good [] Fair  [] Poor    Patient Requires Follow-up: [x] Yes  [] No  Goals:  Short term goals  Time Frame for Short term goals: 4 weeks  Short term goal 1: Pt to be indep with HEP to max rehab potential.   12/10/18-Goal achieved and ongoing progressing HEP  Short term goal 2: Pt to illustrate good to normal sitting balance for ease of transfers and bed mobility skills/ADLS. 12/10/18-Goal achieved  Short term goal 3: Pt to illustrate improve functional mobility by dec time per TUG < or =13 seconds. 12/10/18-Met 11 seconds, 10 second without AFO and without SPC  Short term goal 4: Pt to dec impairment per TEJADA to less than 40%.  12/10/18-Goal achieved    Short term goal 5: Pt to dec Next Session: outdoor ambulation, side stepping, dual task during obstacle negotiation, turning/stepping R activities, interval/speed training on TM (increase total time gradually). After next session reduce freq to 1x/ week. G-Code noted on 12/10/2018:   PT G-Codes  Functional Assessment Tool Used: Tinetti  Functional Limitation: Mobility: Walking and moving around  Mobility: Walking and Moving Around Current Status (): At least 20 percent but less than 40 percent impaired, limited or restricted  Mobility: Walking and Moving Around Goal Status (): 0 percent impaired, limited or restricted    G-Code noted on 11/5/2018:   PT G-Codes  Functional Assessment Tool Used: Tinetti, Vincent, TUG (w/ AFO and SPC), DGI  Score: 21/28, 45/56, 11 sec. 16/24  Functional Limitation: Mobility: Walking and moving around  Mobility: Walking and Moving Around Current Status (): At least 20 percent but less than 40 percent impaired, limited or restricted  Mobility: Walking and Moving Around Goal Status (): 0 percent impaired, limited or restricted    G-Code noted on 10/10/2018:   PT G-Codes  Functional Assessment Tool Used: Tinetti, Vincent, TUG (w/ AFO and SPC)  Score: 19/28, 44/56, 17 sec  Functional Limitation: Mobility: Walking and moving around  Mobility: Walking and Moving Around Current Status (): At least 40 percent but less than 60 percent impaired, limited or restricted  Mobility: Walking and Moving Around Goal Status (): 0 percent impaired, limited or restricted    G-Code noted on 8/17/2018:   PT G-Codes  Functional Assessment Tool Used: Tinetti  Score: 1/24  Functional Limitation: Mobility: Walking and moving around  Mobility: Walking and Moving Around Current Status ():  At least 80 percent but less than 100 percent impaired, limited or restricted  Mobility: Walking and Moving Around Goal Status (): 0 percent impaired, limited or restricted    Electronically signed by:  Mikki Franco

## 2019-04-03 ENCOUNTER — HOSPITAL ENCOUNTER (OUTPATIENT)
Dept: OCCUPATIONAL THERAPY | Age: 69
Setting detail: THERAPIES SERIES
Discharge: HOME | End: 2019-04-03
Payer: MEDICARE

## 2019-04-03 PROCEDURE — 97537 COMMUNITY/WORK REINTEGRATION: CPT

## 2019-04-03 PROCEDURE — 97165 OT EVAL LOW COMPLEX 30 MIN: CPT

## 2019-04-03 NOTE — PROGRESS NOTES
Occupational Therapy  Name: Shiraz Rivera  1950  Date: 4/3/2019  10:14 AM      Time In: 12  Time Out: 1200  Diagnosis: CVA, July 2018  Seizure free for 1 year: yes  Hearing Aids: no   Glasses/contacts: reading  Mobility Status: single point cane  Is client able to transfer into car: yes  License # [id number]  Restrictions on License: Corrective lenses  Expiration date: 5/2/20  Last time client drove: Pt drove to this evaluation. He has been driving for the last month. Car Make/Model and transmission type: [employer] Equinox, automatic  Driving Habits: Frequency: everyday  Night: yes  Snow: yes  Highway: yes  Traffic tickets in last 5 years: no  Accidents in last 5 years: no  Explain:    VISION:  Acuity: ([employer] law =20/40 night driving; 78/89 day driving): ____31/25____UFQLXVO corrective lenses  Peripheral field: ([address] [place] [address] requires 70? visual field on both sides of a fixation point for a non-restricted license or 39? on the other side)  INTACT   Color Vision:  INTACT       Saccades: (ability to rapidly change fixation from one point in the visual field to another)    INTACT   Pursuits: (the continued fixation of a moving object)    INTACT   Depth Perception:    INTACT   Motor Free Visual Perception Test (MVPT):  (Measures visual discrimination, visual memory, visual closure, visual organization, and figure ground skills)    INTACT     COGNITION:  Trail Making Test Parts A & B (involve scanning, speed of mental processing and visual motor sequencing.   Trail Making Part B involves switching cognitive sets too)  Trail Making Part A:   _____33.09______seconds (Norm for Age/Education level:___39.14______seconds)   Trail Making Part B:   _____110.35______seconds (Norm for Age/Education level:____91.32_____seconds)          ROAD SIGN RECOGNITION:  ____9____/9 presented correctly identified    PHYSICAL:          Sensation: __Reports numbness in both feet, however, able to feel the pedals with his shoes johnson._____    (exceptions to normal limits marked by \"X\" and explained)   AROM-  RIGHT AROM-  LEFT STRENGTH-RIGHT STRENGTH-LEFT   SHOULDER       ELBOW       WRIST       HAND       HIP       KNEE       ANKLE         Deficits explained: ________________________________________________________     UE- RIGHT UE- LEFT LE-RIGHT LE-LEFT   PROPRIOCEPTION       LIGHT TOUCH   Slightly decreased but able to feel a harder push through his shoes. Pt reports being able to feel the pedals on the car. Same as left foot. COORDINATION:  (\"X\" to signify intact or impaired)     INTACT IMPAIRED   NECK ROM x    HEEL TO SHIN x    FINGER-NOSE-KNEE x    SITTING BALANCE x    DIADOKOKINESIS x      Client's Stated Goal: To prove he can drive safely after his CVA. ON THE ROAD PERFORMANCE: Pt reacted appropriately to all situations encountered. Pt demonstrated the ability to park, back up, maintain speed and andreina. RECOMMENDATIONS: Resume driving.       EUGENE Tena, [facility], [address] [facility]   Certified  Rehabilitation Specialist

## 2019-04-04 ENCOUNTER — APPOINTMENT (OUTPATIENT)
Dept: PHYSICAL THERAPY | Age: 69
End: 2019-04-04
Payer: MEDICARE

## 2019-04-09 ENCOUNTER — HOSPITAL ENCOUNTER (OUTPATIENT)
Dept: PHYSICAL THERAPY | Age: 69
Setting detail: THERAPIES SERIES
Discharge: HOME | End: 2019-04-09
Payer: MEDICARE

## 2019-04-09 PROCEDURE — 97116 GAIT TRAINING THERAPY: CPT

## 2019-04-09 PROCEDURE — 97110 THERAPEUTIC EXERCISES: CPT

## 2019-04-09 NOTE — FLOWSHEET NOTE
Physical Therapy Daily Treatment Note  Date:  2019  Patient Name:  Ricky Haile      :  1950    MRN: [id number]  Restrictions/Precautions:  Diabetes, ,right ankle and knee pre-existing arthritis, wears glasses  Medical/Treatment Diagnosis Information:  · Medical Diagnosis: CVA I63.40  · Treatment Diagnosis: balance problems 2/2 CVA  Insurance/Certification information:  PT Insurance Information: Medicare  Physician Information:  Referring Practitioner: Dr. Elmo Jain MD Follow-up: Not known   Plan of care signed (Y/N):  Y  Visit# / total visits: 61/64      Pain level: 0/10 chronic posterior R upper arm, tightness. Progress Note: []  Yes  [x]  No   Next Progress Note due by:  Visit # 59 or 19     Subjective: Pt reports he had trouble playing soccer w/ his granddaughter in the yard yesterday. Objective:  Pt drove self to PT today alone- passed  evaluation. Pt amb w/ L AFO and SPC. Pt had cortisone injection in B knee, both feeling better today. Pt has appt w/ podiatrist today, concerned w/ B ankle swelling. [facility] and [facility]. Tests and measures:       Exercises: Session without AFO or AD  Exercise/Equipment Resistance/Repetitions Other comments   Nustep Workload 7:  8 min. B LE only    ,gastroc stretching on edge of step, lunge stretch for hip flexor on step,  30 sec x 2    NMR     Lunge Forward/lateral onto ball side BOSU 10 reps x 1 sets each LE inside // bars UE intermittent need for balance . Added UE reach forward swing back exaggerted push back to challenge SLS and posterior reactions.  Pt w/ difficulty managing combined movement   Obstacle course for stepping around, over cones- forwards and backwards and taller objects, 90 *and 180* turns, stepping on foam surface  50' x4     difficulty clearing taller obstacles forward, lower obstacles going backwards- improved anjana and foot clearance, requires corrective steps to prep for stepping over objects. Gait:    1 min x 30 sec intervals, 2.4mph max speed achieved. Recovery speed 1.7mph. More flexed posture w/ reliance on UE supports w/ pt attempting to talk more and becoming more winded. Consistent R foot clearance. HR 87 before, 112bpm at last high speed interval.    Amb outdoors, up/down ramp, negotiated doors w/ handles and auto-openers, step up/down curb and amb on uneven soft wet grass, all without SPC or AFO, required decreased velocity, but had sufficient R foot clearance and ankle control throughout. He was able to duck head briefly under tree branches without LOB. Education:   HEP:  1. 1/2 bridge without use of the AFO. The LLE is in figure \"4\" on the R LE. Pt instructed to do the ex to both LES. A bridge is performed in which ankle PF/ DF / and neutral are performed  with the supporting LE resulting in a slow count of \"3\" for  increasing the R ankle strategies. Yulisa 5 reps     2. PNF pattern with R  LE off of the table and crossing over the opposing LE(focus on leading with the toes)   3. Prone position: alternating hamstring curls with focus on DF when performing   ( Pt is now yulisa 3/4 range with the RLE with  this ex)  4. 4 way R ankle, when can do 3 x 20 will add tband resistance  5. Sit to stand x 10 reps without UE, daily   6. Lunge stretch (front foot on step) for hip flexor and gastroc and R shoulder horizontal adduction stretch for posterior proximal UE  7. Ball rolling heel to toe, progress large ball to small, sitting to standing. 8. hooklying trunk rotation stretching, 10 sec each side  10  9. Spent most of session reviewing equipment recommendations for use at community fitness center. Recommended use of nustep, treadmill, recumbant bike, sci-fit for cardio. Trialed elliptical, however due to instability during entering/exiting machine do not recommend at this time.  Recommended use of leg press only if sled type of seat vs upright seat, R LE only seated hamstring curl, R LE seated knee ext if not having any knee joint pain, seated low row machine, standing lat pull down if able to do without shoulder/neck pain. Pt able to demo understanding. He will require educ from fitness center on specific set up/adjustment of their equipment to ensure pt safety due to variability in machines. Timed Code Treatment Minutes:  GT 46, TE 12   Total Treatment Minutes:  58    Treatment/Activity Tolerance:  [x] Patient tolerated treatment well [] Patient limited by fatigue  [] Patient limited by pain  [] Patient limited by other medical complications  [] Other:     Assessment: Pt demos improved consistency of R foot clearance and reduced use of hip circumduction on TM, however pt more talkative requiring more redirection to task today. Improved foot clearance and anjana when negotiating obstacles. No LOB or issues amb on outdoor terrain. . Pt will benefit from ongoing skilled PT to address and max indep safety with mobility and lower fall risk. Prognosis: [x] Good [] Fair  [] Poor    Patient Requires Follow-up: [x] Yes  [] No  Goals:  Short term goals  Time Frame for Short term goals: 4 weeks  Short term goal 1: Pt to be indep with HEP to max rehab potential.   12/10/18-Goal achieved and ongoing progressing HEP  Short term goal 2: Pt to illustrate good to normal sitting balance for ease of transfers and bed mobility skills/ADLS. 12/10/18-Goal achieved  Short term goal 3: Pt to illustrate improve functional mobility by dec time per TUG < or =13 seconds. 12/10/18-Met 11 seconds, 10 second without AFO and without SPC  Short term goal 4: Pt to dec impairment per TEJADA to less than 40%. 12/10/18-Goal achieved    Short term goal 5: Pt to dec impairment per Tinetti to less than 40% . 12/10/18-Goal achieved   Short term goal 6: Pt to dec impairment per DGI to less than 40% . 12/10/18- Goal met,  Score 18/24   Short term goal 7: Pt to improve functional strength in LE gradually). G-Code noted on 12/10/2018:   PT G-Codes  Functional Assessment Tool Used: Tinetti  Functional Limitation: Mobility: Walking and moving around  Mobility: Walking and Moving Around Current Status (): At least 20 percent but less than 40 percent impaired, limited or restricted  Mobility: Walking and Moving Around Goal Status (): 0 percent impaired, limited or restricted    G-Code noted on 11/5/2018:   PT G-Codes  Functional Assessment Tool Used: Tinetti, Vincent, TUG (w/ AFO and SPC), DGI  Score: 21/28, 45/56, 11 sec. 16/24  Functional Limitation: Mobility: Walking and moving around  Mobility: Walking and Moving Around Current Status (): At least 20 percent but less than 40 percent impaired, limited or restricted  Mobility: Walking and Moving Around Goal Status (): 0 percent impaired, limited or restricted    G-Code noted on 10/10/2018:   PT G-Codes  Functional Assessment Tool Used: Tinetti, Vincent, TUG (w/ AFO and SPC)  Score: 19/28, 44/56, 17 sec  Functional Limitation: Mobility: Walking and moving around  Mobility: Walking and Moving Around Current Status (): At least 40 percent but less than 60 percent impaired, limited or restricted  Mobility: Walking and Moving Around Goal Status (): 0 percent impaired, limited or restricted    G-Code noted on 8/17/2018:   PT G-Codes  Functional Assessment Tool Used: Tinetti  Score: 1/24  Functional Limitation: Mobility: Walking and moving around  Mobility: Walking and Moving Around Current Status ():  At least 80 percent but less than 100 percent impaired, limited or restricted  Mobility: Walking and Moving Around Goal Status (): 0 percent impaired, limited or restricted    Electronically signed by:  Dalia Gamboa DPT

## 2019-04-16 ENCOUNTER — HOSPITAL ENCOUNTER (OUTPATIENT)
Dept: PHYSICAL THERAPY | Age: 69
Setting detail: THERAPIES SERIES
Discharge: HOME | End: 2019-04-16
Payer: MEDICARE

## 2019-04-16 PROCEDURE — 97530 THERAPEUTIC ACTIVITIES: CPT

## 2019-04-16 PROCEDURE — 97116 GAIT TRAINING THERAPY: CPT

## 2019-04-16 PROCEDURE — 97110 THERAPEUTIC EXERCISES: CPT

## 2019-04-16 NOTE — FLOWSHEET NOTE
Physical Therapy Daily Treatment Note  Date:  2019  Patient Name:  Carolyn Cheatham      :  1950    MRN: [id number]  Restrictions/Precautions:  Diabetes, ,right ankle and knee pre-existing arthritis, wears glasses  Medical/Treatment Diagnosis Information:  · Medical Diagnosis: CVA I63.40  · Treatment Diagnosis: balance problems 2/2 CVA  Insurance/Certification information:  PT Insurance Information: Medicare  Physician Information:  Referring Practitioner: Dr. Laura Modi MD Follow-up: Not known   Plan of care signed (Y/N):  Y  Visit# / total visits: 62/64      Pain level: 0/10 chronic posterior R upper arm, tightness. Progress Note: []  Yes  [x]  No   Next Progress Note due by:  Visit # 64 or 19     Subjective: Pt reports he noticed his foot slipping off the brake when forget to wear his AFO while driving yesterday. Pt notices improvement in pain and feeling of weakness in B knees since cortisone injections. No issues on stairs now. Still catching R lateral foot when turning R causing near falls. Objective:  Pt amb w/ L AFO and SPC. Pt went to community fitness center twice- used TM at 1.8-2. 2mph for a short time and tried knee ext/flex machine. Tests and measures:       Exercises: Session without AFO or AD  Exercise/Equipment Resistance/Repetitions Other comments   Nustep Workload 7:  8 min. B LE only    ,gastroc stretching on edge of step, lunge stretch for hip flexor on step,  30 sec x 2    Lower trunk rotation stretch 15 x 10 sec hold each side  W/ UE abduction and opp head rotation   NMR     Gait: (2 bouts) Warm-up TM: 1.7-8mph, 8 min. Conversation pace. good mechanics w/ mostly equaly step length, R hip hike at early swing,  HR 79 bpm start.  HR 88bpm.     Amb outdoors, up/down ramp, negotiated doors w/ handles and auto-openers, step up/down curb and amb on uneven soft wet grass, all with AFO and intermittently using golf club for cane in grass, required decreased velocity, but had sufficient R foot clearance and ankle control throughout. He was able to duck head, squat and reach under tree branches while navigating raised mulch bed without LOB, CGA. Pt had mult LOB mostly backwards, but able to self correct w/ mult corrective steps. TA:   Pt practiced golf swing w/  in very uneven soft wet grass, intermittently using golf club for support due to LOB anterior. With large golf swing, pt limited by pain/tightness in R posterior shoulder. Encouraged wider stance w/ more knee flexion to improve stability during movement. Pt had mult LOB forward using golf club to stabilize and one significant LOB lateral and then attempted to recover but began stepping backwards requiring max A. Pt practiced putting motion w/ smaller club w/ intermittent need to use golf club for anterior support due to LOB. Good mechanics and balance demonstrated for smaller amplitude movement. Pt using golf club to reach outside [place] and retrieve then place golf balls. Pt able to bend over and  balls from ground using extremely wide stance and wide placement of golf club for stability. Education:   HEP:  1. 1/2 bridge without use of the AFO. The LLE is in figure \"4\" on the R LE. Pt instructed to do the ex to both LES. A bridge is performed in which ankle PF/ DF / and neutral are performed  with the supporting LE resulting in a slow count of \"3\" for  increasing the R ankle strategies. Yulisa 5 reps     2. PNF pattern with R  LE off of the table and crossing over the opposing LE(focus on leading with the toes)   3. Prone position: alternating hamstring curls with focus on DF when performing   ( Pt is now yulisa 3/4 range with the RLE with  this ex)  4. 4 way R ankle, when can do 3 x 20 will add tband resistance  5. Sit to stand x 10 reps without UE, daily   6. Lunge stretch (front foot on step) for hip flexor and gastroc and R shoulder horizontal adduction stretch for posterior proximal UE  7. Ball rolling heel to toe, progress large ball to small, sitting to standing. 8. hooklying trunk rotation stretching, 10 sec each side  10  9. Spent most of session reviewing equipment recommendations for use at [facility]. Recommended use of nustep, treadmill, recumbant bike, sci-fit for cardio. Trialed elliptical, however due to instability during entering/exiting machine do not recommend at this time. Recommended use of leg press only if sled type of seat vs upright seat, R LE only seated hamstring curl, R LE seated knee ext if not having any knee joint pain, seated low row machine, standing lat pull down if able to do without shoulder/neck pain. Pt able to demo understanding. He will require educ from fitness [place] on specific set up/adjustment of their equipment to ensure pt safety due to variability in machines. Timed Code Treatment Minutes:  GT 20, TE 8,TA 30  Total Treatment Minutes:  58    Treatment/Activity Tolerance:  [x] Patient tolerated treatment well [] Patient limited by fatigue  [] Patient limited by pain  [] Patient limited by other medical complications  [] Other:     Assessment: Pt demos good tolerance of ambulation on uneven terrain and good balance w/ small amplitude golf movements such as putting. However w/ larger amplitude such as driving he is limited in ability to maintain stability in the ant and posterior directions. Pt required use of a single support, using the golf club as a support in this situation, for balance. Pt is beginning to perform some independent exercise at [facility] in prep for upcoming transition from PT. Pt will benefit from ongoing skilled PT to address and max indep safety with mobility and lower fall risk.        Prognosis: [x] Good [] Fair  [] Poor    Patient Requires Follow-up: [x] Yes  [] No  Goals:  Short term goals  Time Frame for Short term goals: 4 weeks  Short term goal 1: Pt to be indep with HEP to max rehab potential. 12/10/18-Goal achieved and ongoing progressing HEP  Short term goal 2: Pt to illustrate good to normal sitting balance for ease of transfers and bed mobility skills/ADLS. 12/10/18-Goal achieved  Short term goal 3: Pt to illustrate improve functional mobility by dec time per TUG < or =13 seconds. 12/10/18-Met 11 seconds, 10 second without AFO and without SPC  Short term goal 4: Pt to dec impairment per TEJADA to less than 40%. 12/10/18-Goal achieved    Short term goal 5: Pt to dec impairment per Tinetti to less than 40% . 12/10/18-Goal achieved   Short term goal 6: Pt to dec impairment per DGI to less than 40% . 12/10/18- Goal met,  Score 18/24   Short term goal 7: Pt to improve functional strength in LE evident by improving sit/ 30 seconds from 6 with use of hands to without UE support/use 30 seconds to complete >/=10reps with good descending control and without relying on the back of stabilized chair. Goal modified: Increase LE strength as evidenced by pt moving STS x 10 reps in 30 secs w/ or w/o use of UES-1/9/18- Met, 11 reps w/ normal technique. Short term goal 8: Improve involved LE functional strength to >/3+ to 4-/5 needed for gait mechanics, ease of heel off/toe off and initial heel strike on the involved LE . Progressing, nearly met except remains limited in R knee flex, ankle DF and     Long term goals    Time Frame for Long term goals : 8 weeks-NOT MET  Long term goal 1: Pt to be indep with all functional transfers and mobility all surfaces >/=1000ft to return as safe community ambulation. Progressing, walking long distances on flat outdoor terrain w/ SPC mod I. Requires UE support on furniture for floor to stand transfer. Long term goal 2: Pt to ascend/descend flight of stairs indep for return to prior level and for community/family outtings/gatherings.-Progressing, mod I, but requires at least single rail. Has negotiated stadium stairs in the community w/ supervision.    Long term goal 3: noted on 8/17/2018:   PT G-Codes  Functional Assessment Tool Used: Tinetti  Score: 1/24  Functional Limitation: Mobility: Walking and moving around  Mobility: Walking and Moving Around Current Status ():  At least 80 percent but less than 100 percent impaired, limited or restricted  Mobility: Walking and Moving Around Goal Status (): 0 percent impaired, limited or restricted    Electronically signed by:  Radha Avina DPT

## 2019-04-23 ENCOUNTER — HOSPITAL ENCOUNTER (OUTPATIENT)
Dept: PHYSICAL THERAPY | Age: 69
Setting detail: THERAPIES SERIES
Discharge: HOME | End: 2019-04-23
Payer: MEDICARE

## 2019-04-23 PROCEDURE — 97116 GAIT TRAINING THERAPY: CPT

## 2019-04-23 PROCEDURE — 97112 NEUROMUSCULAR REEDUCATION: CPT

## 2019-04-23 NOTE — FLOWSHEET NOTE
Physical Therapy Daily Treatment Note  Date:  2019  Patient Name:  Carolyn Cheatham      :  1950    MRN: [id number]  Restrictions/Precautions:  Diabetes, ,right ankle and knee pre-existing arthritis, wears glasses  Medical/Treatment Diagnosis Information:  · Medical Diagnosis: CVA I63.40  · Treatment Diagnosis: balance problems 2/2 CVA  Insurance/Certification information:  PT Insurance Information: Medicare  Physician Information:  Referring Practitioner: Dr. Laura Modi MD Follow-up: Not known   Plan of care signed (Y/N):  Y  Visit# / total visits: 63/64      Pain level: 0/10 chronic posterior R upper arm, tightness. Progress Note: []  Yes  [x]  No   Next Progress Note due by:  Visit # 64 or 19     Subjective: Pt reports he had 2 falls in the past week. Last week fell backwards after opening a window and turned to R, caught R lateral foot and fell backwards hitting elbow and head on furniture and closet. Took Aleve to prevent soreness. Celestine Climes outside on grass over the weekend when stepping backwards and looking up at roof of house on a downward slop, fell backwards landing on buttocks, denies injury beyond soreness after either one. Able to get up w/ external support. Pt discouraged by these incidence. Had slight dizziness when turned head to R while lying down last week, but had been drinking wine 1 hour previously. Objective:  Pt amb w/ L AFO and SPC. Pt did some stretching at home, but did not go to fitness center. Tests and measures:       Exercises: Session without AFO or AD  Exercise/Equipment Resistance/Repetitions Other comments   ,gastroc stretching on edge of step, lunge stretch for hip flexor on step,  30 sec x 2    Lower trunk rotation stretch 15 x 10 sec hold each side  W/ UE abduction and opp head rotation   NMR     Lunge Forward/lateral onto ball side BOSU 10 reps x 1 sets each LE inside // bars UE intermittent need for balance .   Added UE reach forward swing back exaggerted push back to challenge SLS and posterior reactions. Pt w/ difficulty managing combined movement   Obstacle course for stepping around, over cones- forwards and backwards and taller objects, 90 *and 180* turns, stepping on foam surface  50' x4     difficulty clearing taller obstacles forward, lower obstacles going backwards- improved anjana and foot clearance, requires corrective steps to prep for stepping over objects. Gait: (2 bouts) Warm-up TM: 1.7-8mph, 8 min. Conversation pace. good mechanics w/ mostly equaly step length, R hip hike at early swing,  HR 79 bpm start. HR 88bpm.     TA:     Education: encouraged pt to increase freq of cardio at gym starting at 2x/week which would also help w/ improving mood. Also encourage pt to wear AFO in the home to prevent catching R foot and causing falls. HEP:  1. 1/2 bridge without use of the AFO. The LLE is in figure \"4\" on the R LE. Pt instructed to do the ex to both LES. A bridge is performed in which ankle PF/ DF / and neutral are performed  with the supporting LE resulting in a slow count of \"3\" for  increasing the R ankle strategies. Yulisa 5 reps     2. PNF pattern with R  LE off of the table and crossing over the opposing LE(focus on leading with the toes)   3. Prone position: alternating hamstring curls with focus on DF when performing   ( Pt is now yulisa 3/4 range with the RLE with  this ex)  4. 4 way R ankle, when can do 3 x 20 will add tband resistance  5. Sit to stand x 10 reps without UE, daily   6. Lunge stretch (front foot on step) for hip flexor and gastroc and R shoulder horizontal adduction stretch for posterior proximal UE  7. Ball rolling heel to toe, progress large ball to small, sitting to standing. 8. hooklying trunk rotation stretching, 10 sec each side  10  9. Spent most of session reviewing equipment recommendations for use at community fitness center. Recommended use of nustep, treadmill, recumbant bike, sci-fit for cardio. Trialed elliptical, however due to instability during entering/exiting machine do not recommend at this time. Recommended use of leg press only if sled type of seat vs upright seat, R LE only seated hamstring curl, R LE seated knee ext if not having any knee joint pain, seated low row machine, standing lat pull down if able to do without shoulder/neck pain. Pt able to demo understanding. He will require educ from fitness center on specific set up/adjustment of their equipment to ensure pt safety due to variability in machines. Timed Code Treatment Minutes:  GT 44, NM10    Total Treatment Minutes:  54    Treatment/Activity Tolerance:  [x] Patient tolerated treatment well [] Patient limited by fatigue  [] Patient limited by pain  [] Patient limited by other medical complications  [] Other:     Assessment: Pt appears to be fatigued in the LEs today, also flatter affect due to being frustrated w/ recent falls. Pt again states he is following up w/ neuropsych on 5/13. He demos improved ability to lift R LE over tall obstacles and had 1 incidence of catching R lateral foot when walking on compliant surface, but was able to self recover. Encouraged more consistent independent exercise at [facility] [place] in prep for upcoming transition from PT. Pt will benefit from ongoing skilled PT to address and max indep safety with mobility and lower fall risk. Prognosis: [x] Good [] Fair  [] Poor    Patient Requires Follow-up: [x] Yes  [] No  Goals:  Short term goals  Time Frame for Short term goals: 4 weeks  Short term goal 1: Pt to be indep with HEP to max rehab potential.   12/10/18-Goal achieved and ongoing progressing HEP  Short term goal 2: Pt to illustrate good to normal sitting balance for ease of transfers and bed mobility skills/ADLS. 12/10/18-Goal achieved  Short term goal 3: Pt to illustrate improve functional mobility by dec time per TUG < or =13 seconds.   12/10/18-Met 11 seconds, 10 second without AFO and without SPC  Short term goal 4: Pt to dec impairment per TEJADA to less than 40%. 12/10/18-Goal achieved    Short term goal 5: Pt to dec impairment per Tinetti to less than 40% . 12/10/18-Goal achieved   Short term goal 6: Pt to dec impairment per DGI to less than 40% . 12/10/18- Goal met,  Score 18/24   Short term goal 7: Pt to improve functional strength in LE evident by improving sit/ 30 seconds from 6 with use of hands to without UE support/use 30 seconds to complete >/=10reps with good descending control and without relying on the back of stabilized chair. Goal modified: Increase LE strength as evidenced by pt moving STS x 10 reps in 30 secs w/ or w/o use of UES-1/9/18- Met, 11 reps w/ normal technique. Short term goal 8: Improve involved LE functional strength to >/3+ to 4-/5 needed for gait mechanics, ease of heel off/toe off and initial heel strike on the involved LE . Progressing, nearly met except remains limited in R knee flex, ankle DF and     Long term goals    Time Frame for Long term goals : 8 weeks-NOT MET  Long term goal 1: Pt to be indep with all functional transfers and mobility all surfaces >/=1000ft to return as safe community ambulation. Progressing, walking long distances on flat outdoor terrain w/ SPC mod I. Requires UE support on furniture for floor to stand transfer. Long term goal 2: Pt to ascend/descend flight of stairs indep for return to prior level and for community/family outtings/gatherings.-Progressing, mod I, but requires at least single rail. Has negotiated stadium stairs in the community w/ supervision. Long term goal 3: Pt to dec impairment Per TEJADA Tinetti DGY to 0% impairment. Not met, but progressing although not as significantly this reporting period as last.      Long term goal 4: pt remain fall free at home and in therapy and confidence with ADLs/IADLS mobility in home and community to >/=75%.  Not met, last fall occurred yesterday.      Plan:      [x] Continue per plan of care      [] Alter current plan  [] Plan of care initiated  [] Hold pending MD visit            [] Discharge      Plan for Next Session:A-P balance reactions, dual task during obstacle negotiation, turning/stepping R activities, interval/speed training on TM (increase total time gradually). G-Code noted on 12/10/2018:   PT G-Codes  Functional Assessment Tool Used: Tinetti  Functional Limitation: Mobility: Walking and moving around  Mobility: Walking and Moving Around Current Status (): At least 20 percent but less than 40 percent impaired, limited or restricted  Mobility: Walking and Moving Around Goal Status (): 0 percent impaired, limited or restricted    G-Code noted on 11/5/2018:   PT G-Codes  Functional Assessment Tool Used: Tinetti, Vincent, TUG (w/ AFO and SPC), DGI  Score: 21/28, 45/56, 11 sec. 16/24  Functional Limitation: Mobility: Walking and moving around  Mobility: Walking and Moving Around Current Status (): At least 20 percent but less than 40 percent impaired, limited or restricted  Mobility: Walking and Moving Around Goal Status (): 0 percent impaired, limited or restricted    G-Code noted on 10/10/2018:   PT G-Codes  Functional Assessment Tool Used: Tinetti, Vincent, TUG (w/ AFO and SPC)  Score: 19/28, 44/56, 17 sec  Functional Limitation: Mobility: Walking and moving around  Mobility: Walking and Moving Around Current Status (): At least 40 percent but less than 60 percent impaired, limited or restricted  Mobility: Walking and Moving Around Goal Status (): 0 percent impaired, limited or restricted    G-Code noted on 8/17/2018:   PT G-Codes  Functional Assessment Tool Used: Tinetti  Score: 1/24  Functional Limitation: Mobility: Walking and moving around  Mobility: Walking and Moving Around Current Status ():  At least 80 percent but less than 100 percent impaired, limited or restricted  Mobility: Walking and Moving Around Goal Status (): 0 percent impaired, limited or restricted    Electronically signed by:  Sulema Johnson DPT

## 2019-04-30 ENCOUNTER — HOSPITAL ENCOUNTER (OUTPATIENT)
Dept: PHYSICAL THERAPY | Age: 69
Setting detail: THERAPIES SERIES
Discharge: HOME | End: 2019-04-30
Payer: MEDICARE

## 2019-04-30 PROCEDURE — 97116 GAIT TRAINING THERAPY: CPT

## 2019-04-30 PROCEDURE — 97530 THERAPEUTIC ACTIVITIES: CPT

## 2019-04-30 NOTE — FLOWSHEET NOTE
Physical Therapy Daily Treatment Note  Date:  2019  Patient Name:  Kerri Hyatt      :  1950    MRN: [id number]  Restrictions/Precautions:  Diabetes, ,right ankle and knee pre-existing arthritis, wears glasses  Medical/Treatment Diagnosis Information:  · Medical Diagnosis: CVA I63.40  · Treatment Diagnosis: balance problems 2/2 CVA  Insurance/Certification information:  PT Insurance Information: Medicare  Physician Information:  Referring Practitioner: Dr. Amara Kenny MD Follow-up: Not known   Plan of care signed (Y/N):  Y  Visit# / total visits: 64/64      Pain level: 0/10 (chronic posterior R upper arm, tightness)       Progress Note: Discharge Note       Subjective: Pt reports difficulty navigating at hockey game in stadium requiring step down narrow concrete steps without rails required assist from son. Also negotiated mult outdoor terrain with difficulty required assist from son to negotiate around puddle and up against fence. Also walked on uneven gravel with grand daughter. Denies falls. Objective:  Pt amb w/ L AFO and SPC. Pt did some stretching at home, at fitness center used TM and used knee ext/flex machine. Tests and measures:    30 sec sit to stand: 12.5 reps (19\" height seat, no UE support, no difficulty). Avg for age is 14-23 reps.     DGI:        Vincent/56        MMT       R L    Hip flexion 4+ 5   Hip int rotation 5     Hip ext rotation  5     Hip extension  5     Hip abduction 4+     Knee extension 5     Knee flexion 3- (AROM 70* prone)     Ankle DF 3- (able to take max resistance within avail ROM= -4* AROM)     Ankle PF 3-  5   Ankle eversion 4+ (20* ROM)     Ankle inversion 4+              Exercises: Session without AFO or AD  Exercise/Equipment Resistance/Repetitions Other comments   ,gastroc stretching on edge of step, lunge stretch for hip flexor on step,  30 sec x 2    NMR     Gait: Warm-up TM: 1.7-8mph, 5 min. Conversation pace. good mechanics w/ mostly equaly step length, R hip hike at early swing. TA:     Education: encouraged pt to increase freq of cardio at gym starting at 2x/week which would also help w/ improving mood. Also encourage pt to wear AFO in the home to prevent catching R foot and causing falls. HEP:  1. 1/2 bridge without use of the AFO. The LLE is in figure \"4\" on the R LE. Pt instructed to do the ex to both LES. A bridge is performed in which ankle PF/ DF / and neutral are performed  with the supporting LE resulting in a slow count of \"3\" for  increasing the R ankle strategies. Yulisa 5 reps     2. PNF pattern with R  LE off of the table and crossing over the opposing LE(focus on leading with the toes)   3. Prone position: alternating hamstring curls with focus on DF when performing   ( Pt is now yulisa 3/4 range with the RLE with  this ex)  4. 4 way R ankle, when can do 3 x 20 will add tband resistance  5. Sit to stand x 10 reps without UE, daily   6. Lunge stretch (front foot on step) for hip flexor and gastroc and R shoulder horizontal adduction stretch for posterior proximal UE  7. Ball rolling heel to toe, progress large ball to small, sitting to standing. 8. hooklying trunk rotation stretching, 10 sec each side  10  9. Spent most of session reviewing equipment recommendations for use at community fitness center. Recommended use of nustep, treadmill, recumbant bike, sci-fit for cardio. Trialed elliptical, however due to instability during entering/exiting machine do not recommend at this time. Recommended use of leg press only if sled type of seat vs upright seat, R LE only seated hamstring curl, R LE seated knee ext if not having any knee joint pain, seated low row machine, standing lat pull down if able to do without shoulder/neck pain. Pt able to demo understanding. He will require educ from fitness center on specific set up/adjustment of their equipment to ensure pt safety due to variability in machines.      Timed Code DGI to less than 40% . 12/10/18- Goal met,  Score 18/24   Short term goal 7: Pt to improve functional strength in LE evident by improving sit/ 30 seconds from 6 with use of hands to without UE support/use 30 seconds to complete >/=10reps with good descending control and without relying on the back of stabilized chair. Goal modified: Increase LE strength as evidenced by pt moving STS x 10 reps in 30 secs w/ or w/o use of UES-1/9/18- Met, 11 reps w/ normal technique. Short term goal 8: Improve involved LE functional strength to >/3+ to 4-/5 needed for gait mechanics, ease of heel off/toe off and initial heel strike on the involved LE . Partially met, all strength improved, R knee flex, ankle DF and PF remained 3-/5. Long term goals    Time Frame for Long term goals : 8 weeks  Long term goal 1: Pt to be indep with all functional transfers and mobility all surfaces >/=1000ft to return as safe community ambulation. Met, all mod I.   Long term goal 2: Pt to ascend/descend flight of stairs indep for return to prior level and for community/family outtings/gatherings.-Met, mod I w/ rail. Has negotiated stadium stairs in the community w/ supervision. Long term goal 3: Pt to dec impairment Per TEJADA Tinetti DGY to 0% impairment. Not met, but significantly improved to low fall risk. Long term goal 4: pt remain fall free at home and in therapy and confidence with ADLs/IADLS mobility in home and community to >/=75%. Mostly met, pt still sustaining falls or near falls at home.  Pt aware of cause (peripheral neuropathy and decreased strength in R foot), recommend wearing R AFO in home to prevent catching lateral border of foot when turning.      Plan:      [] Continue per plan of care      [] Alter current plan  [] Plan of care initiated  [] Hold pending MD visit            [x] Discharge         G-Code noted on 12/10/2018:   PT G-Codes  Functional Assessment Tool Used: Tinetti  Functional Limitation: Mobility: Walking

## 2019-05-15 NOTE — DISCHARGE SUMMARY
Outpatient Physical Therapy Phone: [phone] Fax: [phone]    Discharge Date: 19    To: Dr. Wilson Proctor    From: Julia Elias, PT, DPT  Patient: Nadine Dia     : 1950 MRN: [id number]  Medical/Treatment Diagnosis Information:  · Medical Diagnosis: CVA I63.40  · Treatment Diagnosis: balance problems 2/2 CVA      Physical Therapy Discharge Note    Time Period for Report:  3/19/19-19    Total Visits to date:   59   Cancels/No-shows to date: 0    Plan of Care/Treatment to date:  [x] Therapeutic Exercise  [x] Therapeutic Activity   [x] Gait Training  [x] Neuromuscular Re-education  [x] Manual Therapy  [x] Modalities:         [] Ultrasound  [] Electrical Stimulation            [] Cervical Traction [] Lumbar Traction    ? [] Cold/hotpack [] Iontophoresis   Comments:    [] Pt did not adhere to Plan of Care/did not return for follow-up visits. Pain (Eval) 0-10   Pain (D/C) 0     Functional Outcome used:     Functional Outcome (Eval) 30 sec sit to stand:7 reps, DGI: Vincent:,  50/56   Functional Outcome (D/C) 30 sec sit to stand: 12.5 reps, DGI: , Vincent/56         Other significant findings at last visit:  30 sec sit to stand: 12.5 reps (19\" height seat, no UE support, no difficulty). Avg for age is 14-23 reps.      DGI:        Vincent/56        MMT       R L    Hip flexion 4+ 5   Hip int rotation 5     Hip ext rotation  5     Hip extension  5     Hip abduction 4+     Knee extension 5     Knee flexion 3- (AROM 70* prone)     Ankle DF 3- (able to take max resistance within avail ROM= -4* AROM)     Ankle PF 3-  5   Ankle eversion 4+ (20* ROM)     Ankle inversion 4+               ·     Progress towards goals:    Pt met 7/8 ST goals, partially met 1/8 ST goals. Pt met 2/4 LT goals and partially met 2/4 LT goals. Pt demonstrates overall great improvements in R LE strength, ROM and balance.  He continues to be limited by combination of reduced sensory and proprioception awareness in B feet due to peripheral neuropathy in addition to mild residual R ankle,hamstring and hip weakness from CVA. Pt benefits from use of R carbon fiber for safety during all mobility indoors and outdoors. Pt has been instructed in HEP as well as equipment use at [facility]. Pt also following up on recommendation for neuropsychology treatment due to ongoing depression from CVA that impacts pt's motivation and activity level. Pt may benefit from follow-up PT in 6-12 months to reassess HEP and ability to maintain functional mobility level while aging w/ chronic CVA. Goals:  Short term goals  Time Frame for Short term goals: 4 weeks  Short term goal 1: Pt to be indep with HEP to max rehab potential.   12/10/18-Goal achieved and ongoing progressing HEP  Short term goal 2: Pt to illustrate good to normal sitting balance for ease of transfers and bed mobility skills/ADLS. 12/10/18-Goal achieved  Short term goal 3: Pt to illustrate improve functional mobility by dec time per TUG < or =13 seconds. 12/10/18-Met 11 seconds, 10 second without AFO and without SPC  Short term goal 4: Pt to dec impairment per TEJADA to less than 40%. 12/10/18-Goal achieved    Short term goal 5: Pt to dec impairment per Tinetti to less than 40% . 12/10/18-Goal achieved   Short term goal 6: Pt to dec impairment per DGI to less than 40% . 12/10/18- Goal met,  Score 18/24   Short term goal 7: Pt to improve functional strength in LE evident by improving sit/ 30 seconds from 6 with use of hands to without UE support/use 30 seconds to complete >/=10reps with good descending control and without relying on the back of stabilized chair. Goal modified: Increase LE strength as evidenced by pt moving STS x 10 reps in 30 secs w/ or w/o use of UES-1/9/18- Met, 11 reps w/ normal technique.    Short term goal 8: Improve involved LE functional strength to >/3+ to 4-/5 needed for gait mechanics, ease of heel off/toe off and initial heel strike on the involved LE . Partially met, all strength improved, R knee flex, ankle DF and PF remained 3-/5. Long term goals    Time Frame for Long term goals : 8 weeks  Long term goal 1: Pt to be indep with all functional transfers and mobility all surfaces >/=1000ft to return as safe community ambulation. Met, all mod I.   Long term goal 2: Pt to ascend/descend flight of stairs indep for return to prior level and for community/family outtings/gatherings.-Met, mod I w/ rail. Has negotiated stadium stairs in the community w/ supervision. Long term goal 3: Pt to dec impairment Per TEJADA Tinetti DGY to 0% impairment. Not met, but significantly improved to low fall risk. Long term goal 4: pt remain fall free at home and in therapy and confidence with ADLs/IADLS mobility in home and community to >/=75%. Mostly met, pt still sustaining falls or near falls at home.  Pt aware of cause (peripheral neuropathy and decreased strength in R foot), recommend wearing R AFO in home to prevent catching lateral border of foot when turning.      Plan:      [] Continue per plan of care      [] Alter current plan  [] Plan of care initiated  [] Hold pending MD visit            [x] Discharge         Goal Status:  [] Achieved [x] Partially Achieved  [] Not Achieved     Patient Status: [x] Patient now discharged      Electronically signed by:  Julia Elias, PT, DPT

## 2019-10-25 ENCOUNTER — HOSPITAL ENCOUNTER (EMERGENCY)
Age: 69
Discharge: HOME | End: 2019-10-25
Attending: EMERGENCY MEDICINE
Payer: MEDICARE

## 2019-10-25 ENCOUNTER — APPOINTMENT (OUTPATIENT)
Dept: CT IMAGING | Age: 69
End: 2019-10-25
Payer: MEDICARE

## 2019-10-25 VITALS
OXYGEN SATURATION: 98 % | SYSTOLIC BLOOD PRESSURE: 137 MMHG | DIASTOLIC BLOOD PRESSURE: 61 MMHG | HEIGHT: 76 IN | HEART RATE: 64 BPM | RESPIRATION RATE: 16 BRPM | WEIGHT: 251 LBS | BODY MASS INDEX: 30.56 KG/M2

## 2019-10-25 DIAGNOSIS — S09.90XA INJURY OF HEAD, INITIAL ENCOUNTER: ICD-10-CM

## 2019-10-25 DIAGNOSIS — S00.03XA CONTUSION OF SCALP, INITIAL ENCOUNTER: Primary | ICD-10-CM

## 2019-10-25 PROCEDURE — 6360000002 HC RX W HCPCS: Performed by: PHYSICIAN ASSISTANT

## 2019-10-25 PROCEDURE — 70450 CT HEAD/BRAIN W/O DYE: CPT

## 2019-10-25 PROCEDURE — 72125 CT NECK SPINE W/O DYE: CPT

## 2019-10-25 PROCEDURE — 90715 TDAP VACCINE 7 YRS/> IM: CPT | Performed by: PHYSICIAN ASSISTANT

## 2019-10-25 PROCEDURE — 90471 IMMUNIZATION ADMIN: CPT | Performed by: PHYSICIAN ASSISTANT

## 2019-10-25 PROCEDURE — 99283 EMERGENCY DEPT VISIT LOW MDM: CPT

## 2019-10-25 RX ADMIN — TETANUS TOXOID, REDUCED DIPHTHERIA TOXOID AND ACELLULAR PERTUSSIS VACCINE, ADSORBED 0.5 ML: 5; 2.5; 8; 8; 2.5 SUSPENSION INTRAMUSCULAR at 19:47

## 2019-10-25 ASSESSMENT — PAIN DESCRIPTION - PAIN TYPE: TYPE: ACUTE PAIN

## 2019-10-25 ASSESSMENT — PAIN SCALES - GENERAL: PAINLEVEL_OUTOF10: 5

## 2019-10-25 ASSESSMENT — PAIN DESCRIPTION - LOCATION: LOCATION: HEAD

## 2019-10-30 ASSESSMENT — ENCOUNTER SYMPTOMS
DIARRHEA: 0
CONSTIPATION: 0
SHORTNESS OF BREATH: 0
SORE THROAT: 0
NAUSEA: 0
ABDOMINAL PAIN: 0
BACK PAIN: 0
COUGH: 0
FACIAL SWELLING: 0
EYE REDNESS: 0
VOMITING: 0
STRIDOR: 0

## 2021-06-11 NOTE — PROGRESS NOTES
NOMS - Motor Speech      Patient: Annmarie Jones  : 1950  MRN: [id number]  Date: 2018  Electronically Signed by: Lukas Smith MA, CCC-SLP     Note: Individuals who exhibit deficits in speech production may exhibit underlying deficits in respiration, phonation, articulation, prosody, and resonance. In some instances, it may be beneficial to utilize addition FCMs focusing on voice if disordered phonation is a large component. []  Level 1 The individual attempts to speak, but speech cannot be understood by familiar or unfamiliar listeners at any time    []  Level 2 The individual attempts to speak. The communication partner must assume responsibility for interpreting the message, and with consistent and maximal cues the patient can produce short consonant-vowel combinations or automatic words that are rarely intelligible in context    []  Level 3  The communication partner must assume primary responsibility for interpreting the communication exchange, however, the individual is able to produce short consonant-vowel combinations or automatic words intelligibly. With consistent and moderate cueing, the individual can produce simple words and phrases intelligibly, although accuracy may vary    []  Level 4 In simple structured conversation with familiar communication partners, the individual can produce simple words and phrases intelligibly. The individual usually requires moderate cueing in order to produce simple sentences intelligibly although accuracy may vary     []  Level 5 The individual is able to speak intelligibly using simple sentences in daily routine activity with both familiar and unfamiliar communication partners.   The individual occasionally requires minimal cueing to produe more complex sentences/messages in routine activities, although accuracy may vary and the individual may occasionally use compensatory strategies    [x]  Level 6 The individual is successfully able
Potential: [] Excellent [x] Good [] Fair  [] Poor       Electronically signed by:  Renae Padilla M.A., [address] [facility]  Speech-Language Pathologist  [facility]. [address] Out[facility]  Phone: [phone]  Fax: [phone]        If you have any questions or concerns, please don't hesitate to call.   Thank you for your referral.      Physician Signature:________________________________Date:__________________  By signing above, therapists plan is approved by physician
Airway patent, AT/NC

## 2021-11-11 ENCOUNTER — HOSPITAL ENCOUNTER (EMERGENCY)
Age: 71
Discharge: LEFT BEFORE DISCHARGE | End: 2021-11-11
Payer: MEDICARE

## 2023-08-28 ENCOUNTER — HOSPITAL ENCOUNTER (INPATIENT)
Age: 73
LOS: 2 days | Discharge: HOME | DRG: 694 | End: 2023-08-31
Attending: EMERGENCY MEDICINE | Admitting: SURGERY
Payer: MEDICARE

## 2023-08-28 DIAGNOSIS — N13.8 OBSTRUCTIVE NEPHROPATHY: ICD-10-CM

## 2023-08-28 DIAGNOSIS — N17.9 ACUTE KIDNEY INJURY (HCC): Primary | ICD-10-CM

## 2023-08-28 DIAGNOSIS — R33.9 URINARY RETENTION: ICD-10-CM

## 2023-08-28 LAB
ANION GAP SERPL CALCULATED.3IONS-SCNC: 14 MMOL/L (ref 3–16)
BASOPHILS # BLD: 0.1 K/UL (ref 0–0.2)
BASOPHILS NFR BLD: 0.6 %
BILIRUB UR QL STRIP.AUTO: NEGATIVE
BUN SERPL-MCNC: 44 MG/DL (ref 7–20)
CALCIUM SERPL-MCNC: 10.3 MG/DL (ref 8.3–10.6)
CHLORIDE SERPL-SCNC: 98 MMOL/L (ref 99–110)
CLARITY UR: CLEAR
CO2 SERPL-SCNC: 28 MMOL/L (ref 21–32)
COLOR UR: YELLOW
CREAT SERPL-MCNC: 4.3 MG/DL (ref 0.8–1.3)
DEPRECATED RDW RBC AUTO: 13.5 % (ref 12.4–15.4)
EOSINOPHIL # BLD: 0.3 K/UL (ref 0–0.6)
EOSINOPHIL NFR BLD: 3.4 %
EPI CELLS #/AREA URNS HPF: NORMAL /HPF (ref 0–5)
GFR SERPLBLD CREATININE-BSD FMLA CKD-EPI: 14 ML/MIN/{1.73_M2}
GLUCOSE SERPL-MCNC: 149 MG/DL (ref 70–99)
GLUCOSE UR STRIP.AUTO-MCNC: NEGATIVE MG/DL
HCT VFR BLD AUTO: 32.4 % (ref 40.5–52.5)
HGB BLD-MCNC: 11.2 G/DL (ref 13.5–17.5)
HGB UR QL STRIP.AUTO: NEGATIVE
KETONES UR STRIP.AUTO-MCNC: NEGATIVE MG/DL
LEUKOCYTE ESTERASE UR QL STRIP.AUTO: NEGATIVE
LYMPHOCYTES # BLD: 1 K/UL (ref 1–5.1)
LYMPHOCYTES NFR BLD: 11.3 %
MCH RBC QN AUTO: 30.2 PG (ref 26–34)
MCHC RBC AUTO-ENTMCNC: 34.5 G/DL (ref 31–36)
MCV RBC AUTO: 87.7 FL (ref 80–100)
MONOCYTES # BLD: 0.8 K/UL (ref 0–1.3)
MONOCYTES NFR BLD: 8.9 %
NEUTROPHILS # BLD: 7 K/UL (ref 1.7–7.7)
NEUTROPHILS NFR BLD: 75.8 %
NITRITE UR QL STRIP.AUTO: NEGATIVE
PH UR STRIP.AUTO: 6 [PH] (ref 5–8)
PLATELET # BLD AUTO: 225 K/UL (ref 135–450)
PMV BLD AUTO: 8.2 FL (ref 5–10.5)
POTASSIUM SERPL-SCNC: 4.2 MMOL/L (ref 3.5–5.1)
PROT UR STRIP.AUTO-MCNC: ABNORMAL MG/DL
RBC # BLD AUTO: 3.69 M/UL (ref 4.2–5.9)
RBC #/AREA URNS HPF: NORMAL /HPF (ref 0–4)
SODIUM SERPL-SCNC: 140 MMOL/L (ref 136–145)
SP GR UR STRIP.AUTO: 1.01 (ref 1–1.03)
UA COMPLETE W REFLEX CULTURE PNL UR: ABNORMAL
UA DIPSTICK W REFLEX MICRO PNL UR: YES
URN SPEC COLLECT METH UR: ABNORMAL
UROBILINOGEN UR STRIP-ACNC: 0.2 E.U./DL
WBC # BLD AUTO: 9.3 K/UL (ref 4–11)
WBC #/AREA URNS HPF: NORMAL /HPF (ref 0–5)

## 2023-08-28 PROCEDURE — 51702 INSERT TEMP BLADDER CATH: CPT

## 2023-08-28 PROCEDURE — 85025 COMPLETE CBC W/AUTO DIFF WBC: CPT

## 2023-08-28 PROCEDURE — 81001 URINALYSIS AUTO W/SCOPE: CPT

## 2023-08-28 PROCEDURE — 80048 BASIC METABOLIC PNL TOTAL CA: CPT

## 2023-08-28 PROCEDURE — 99285 EMERGENCY DEPT VISIT HI MDM: CPT

## 2023-08-28 PROCEDURE — 36415 COLL VENOUS BLD VENIPUNCTURE: CPT

## 2023-08-28 ASSESSMENT — PAIN - FUNCTIONAL ASSESSMENT: PAIN_FUNCTIONAL_ASSESSMENT: 0-10

## 2023-08-28 ASSESSMENT — PAIN SCALES - GENERAL: PAINLEVEL_OUTOF10: 0

## 2023-08-28 NOTE — ED TRIAGE NOTES
Patient comes to ER for abnormal lab results. Patient had labs drawn through [facility] and had a creatinine of 2.5 and was instructed too come into ER for evaluation.    Patient c/o of \"urine backing up into kidneys causing it to make my numbers bad\"    Patient stats he last voided about 30 mins before coming into ER

## 2023-08-29 PROBLEM — N13.8 OBSTRUCTIVE NEPHROPATHY: Status: ACTIVE | Noted: 2023-08-29

## 2023-08-29 LAB
ANION GAP SERPL CALCULATED.3IONS-SCNC: 10 MMOL/L (ref 3–16)
BASOPHILS # BLD: 0 K/UL (ref 0–0.2)
BASOPHILS NFR BLD: 0.5 %
BUN SERPL-MCNC: 41 MG/DL (ref 7–20)
CALCIUM SERPL-MCNC: 11.4 MG/DL (ref 8.3–10.6)
CHLORIDE SERPL-SCNC: 99 MMOL/L (ref 99–110)
CO2 SERPL-SCNC: 34 MMOL/L (ref 21–32)
CREAT SERPL-MCNC: 3.5 MG/DL (ref 0.8–1.3)
DEPRECATED RDW RBC AUTO: 13.4 % (ref 12.4–15.4)
EOSINOPHIL # BLD: 0.3 K/UL (ref 0–0.6)
EOSINOPHIL NFR BLD: 3.4 %
GFR SERPLBLD CREATININE-BSD FMLA CKD-EPI: 18 ML/MIN/{1.73_M2}
GLUCOSE SERPL-MCNC: 208 MG/DL (ref 70–99)
HCT VFR BLD AUTO: 36.2 % (ref 40.5–52.5)
HGB BLD-MCNC: 12.5 G/DL (ref 13.5–17.5)
LYMPHOCYTES # BLD: 1.1 K/UL (ref 1–5.1)
LYMPHOCYTES NFR BLD: 12.7 %
MAGNESIUM SERPL-MCNC: 2 MG/DL (ref 1.8–2.4)
MCH RBC QN AUTO: 30.2 PG (ref 26–34)
MCHC RBC AUTO-ENTMCNC: 34.6 G/DL (ref 31–36)
MCV RBC AUTO: 87.3 FL (ref 80–100)
MONOCYTES # BLD: 0.9 K/UL (ref 0–1.3)
MONOCYTES NFR BLD: 9.9 %
NEUTROPHILS # BLD: 6.4 K/UL (ref 1.7–7.7)
NEUTROPHILS NFR BLD: 73.5 %
PLATELET # BLD AUTO: 249 K/UL (ref 135–450)
PMV BLD AUTO: 7.7 FL (ref 5–10.5)
POTASSIUM SERPL-SCNC: 3.9 MMOL/L (ref 3.5–5.1)
RBC # BLD AUTO: 4.15 M/UL (ref 4.2–5.9)
SODIUM SERPL-SCNC: 143 MMOL/L (ref 136–145)
WBC # BLD AUTO: 8.7 K/UL (ref 4–11)

## 2023-08-29 PROCEDURE — 84155 ASSAY OF PROTEIN SERUM: CPT

## 2023-08-29 PROCEDURE — 83883 ASSAY NEPHELOMETRY NOT SPEC: CPT

## 2023-08-29 PROCEDURE — 36415 COLL VENOUS BLD VENIPUNCTURE: CPT

## 2023-08-29 PROCEDURE — 6370000000 HC RX 637 (ALT 250 FOR IP)

## 2023-08-29 PROCEDURE — 2580000003 HC RX 258: Performed by: SURGERY

## 2023-08-29 PROCEDURE — 80048 BASIC METABOLIC PNL TOTAL CA: CPT

## 2023-08-29 PROCEDURE — 83970 ASSAY OF PARATHORMONE: CPT

## 2023-08-29 PROCEDURE — 85025 COMPLETE CBC W/AUTO DIFF WBC: CPT

## 2023-08-29 PROCEDURE — 83735 ASSAY OF MAGNESIUM: CPT

## 2023-08-29 PROCEDURE — 84165 PROTEIN E-PHORESIS SERUM: CPT

## 2023-08-29 PROCEDURE — 6360000002 HC RX W HCPCS: Performed by: SURGERY

## 2023-08-29 PROCEDURE — 6370000000 HC RX 637 (ALT 250 FOR IP): Performed by: PHYSICIAN ASSISTANT

## 2023-08-29 PROCEDURE — 1200000000 HC SEMI PRIVATE

## 2023-08-29 RX ORDER — ACETAMINOPHEN 650 MG/1
650 SUPPOSITORY RECTAL EVERY 6 HOURS PRN
Status: DISCONTINUED | OUTPATIENT
Start: 2023-08-29 | End: 2023-08-31 | Stop reason: HOSPADM

## 2023-08-29 RX ORDER — ONDANSETRON 2 MG/ML
4 INJECTION INTRAMUSCULAR; INTRAVENOUS EVERY 6 HOURS PRN
Status: DISCONTINUED | OUTPATIENT
Start: 2023-08-29 | End: 2023-08-31 | Stop reason: HOSPADM

## 2023-08-29 RX ORDER — TAMSULOSIN HYDROCHLORIDE 0.4 MG/1
0.4 CAPSULE ORAL DAILY
Status: DISCONTINUED | OUTPATIENT
Start: 2023-08-29 | End: 2023-08-31 | Stop reason: HOSPADM

## 2023-08-29 RX ORDER — LABETALOL HYDROCHLORIDE 5 MG/ML
10 INJECTION, SOLUTION INTRAVENOUS EVERY 4 HOURS PRN
Status: DISCONTINUED | OUTPATIENT
Start: 2023-08-29 | End: 2023-08-31 | Stop reason: HOSPADM

## 2023-08-29 RX ORDER — SODIUM CHLORIDE 0.9 % (FLUSH) 0.9 %
5-40 SYRINGE (ML) INJECTION EVERY 12 HOURS SCHEDULED
Status: DISCONTINUED | OUTPATIENT
Start: 2023-08-29 | End: 2023-08-31 | Stop reason: HOSPADM

## 2023-08-29 RX ORDER — ATORVASTATIN CALCIUM 40 MG/1
80 TABLET, FILM COATED ORAL NIGHTLY
Status: DISCONTINUED | OUTPATIENT
Start: 2023-08-29 | End: 2023-08-29

## 2023-08-29 RX ORDER — SODIUM CHLORIDE 9 MG/ML
INJECTION, SOLUTION INTRAVENOUS PRN
Status: DISCONTINUED | OUTPATIENT
Start: 2023-08-29 | End: 2023-08-31 | Stop reason: HOSPADM

## 2023-08-29 RX ORDER — ACETAMINOPHEN 325 MG/1
650 TABLET ORAL EVERY 6 HOURS PRN
Status: DISCONTINUED | OUTPATIENT
Start: 2023-08-29 | End: 2023-08-31 | Stop reason: HOSPADM

## 2023-08-29 RX ORDER — AMLODIPINE BESYLATE 10 MG/1
10 TABLET ORAL DAILY
Status: DISCONTINUED | OUTPATIENT
Start: 2023-08-29 | End: 2023-08-29

## 2023-08-29 RX ORDER — POLYETHYLENE GLYCOL 3350 17 G/17G
17 POWDER, FOR SOLUTION ORAL DAILY PRN
Status: DISCONTINUED | OUTPATIENT
Start: 2023-08-29 | End: 2023-08-31 | Stop reason: HOSPADM

## 2023-08-29 RX ORDER — ALBUTEROL SULFATE 2.5 MG/3ML
2.5 SOLUTION RESPIRATORY (INHALATION) EVERY 4 HOURS PRN
Status: DISCONTINUED | OUTPATIENT
Start: 2023-08-29 | End: 2023-08-29

## 2023-08-29 RX ORDER — METOPROLOL SUCCINATE 25 MG/1
25 TABLET, EXTENDED RELEASE ORAL DAILY
Status: DISCONTINUED | OUTPATIENT
Start: 2023-08-29 | End: 2023-08-31 | Stop reason: HOSPADM

## 2023-08-29 RX ORDER — AMLODIPINE BESYLATE 10 MG/1
10 TABLET ORAL DAILY
Status: DISCONTINUED | OUTPATIENT
Start: 2023-08-29 | End: 2023-08-31 | Stop reason: HOSPADM

## 2023-08-29 RX ORDER — TAMSULOSIN HYDROCHLORIDE 0.4 MG/1
0.4 CAPSULE ORAL NIGHTLY
Status: DISCONTINUED | OUTPATIENT
Start: 2023-08-29 | End: 2023-08-29

## 2023-08-29 RX ORDER — GUAIFENESIN 600 MG/1
1200 TABLET, EXTENDED RELEASE ORAL DAILY
Status: DISCONTINUED | OUTPATIENT
Start: 2023-08-29 | End: 2023-08-29

## 2023-08-29 RX ORDER — FLUTICASONE PROPIONATE 50 MCG
1 SPRAY, SUSPENSION (ML) NASAL DAILY
Status: DISCONTINUED | OUTPATIENT
Start: 2023-08-29 | End: 2023-08-29

## 2023-08-29 RX ORDER — TROSPIUM CHLORIDE 20 MG/1
20 TABLET, FILM COATED ORAL NIGHTLY
Status: DISCONTINUED | OUTPATIENT
Start: 2023-08-29 | End: 2023-08-29

## 2023-08-29 RX ORDER — SODIUM CHLORIDE 0.9 % (FLUSH) 0.9 %
5-40 SYRINGE (ML) INJECTION PRN
Status: DISCONTINUED | OUTPATIENT
Start: 2023-08-29 | End: 2023-08-31 | Stop reason: HOSPADM

## 2023-08-29 RX ORDER — HYDROCODONE BITARTRATE AND ACETAMINOPHEN 5; 325 MG/1; MG/1
2 TABLET ORAL EVERY 4 HOURS PRN
Status: DISCONTINUED | OUTPATIENT
Start: 2023-08-29 | End: 2023-08-31 | Stop reason: HOSPADM

## 2023-08-29 RX ORDER — TROSPIUM CHLORIDE 20 MG/1
20 TABLET, FILM COATED ORAL 2 TIMES DAILY
COMMUNITY
End: 2023-09-21

## 2023-08-29 RX ORDER — HEPARIN SODIUM 5000 [USP'U]/ML
5000 INJECTION, SOLUTION INTRAVENOUS; SUBCUTANEOUS EVERY 8 HOURS SCHEDULED
Status: DISCONTINUED | OUTPATIENT
Start: 2023-08-29 | End: 2023-08-31 | Stop reason: HOSPADM

## 2023-08-29 RX ORDER — ONDANSETRON 4 MG/1
4 TABLET, ORALLY DISINTEGRATING ORAL EVERY 8 HOURS PRN
Status: DISCONTINUED | OUTPATIENT
Start: 2023-08-29 | End: 2023-08-31 | Stop reason: HOSPADM

## 2023-08-29 RX ORDER — HYDROCODONE BITARTRATE AND ACETAMINOPHEN 5; 325 MG/1; MG/1
1 TABLET ORAL EVERY 4 HOURS PRN
Status: DISCONTINUED | OUTPATIENT
Start: 2023-08-29 | End: 2023-08-31 | Stop reason: HOSPADM

## 2023-08-29 RX ORDER — ASPIRIN 81 MG/1
81 TABLET ORAL DAILY
Status: DISCONTINUED | OUTPATIENT
Start: 2023-08-29 | End: 2023-08-29

## 2023-08-29 RX ADMIN — HEPARIN SODIUM 5000 UNITS: 5000 INJECTION INTRAVENOUS; SUBCUTANEOUS at 14:40

## 2023-08-29 RX ADMIN — AMLODIPINE BESYLATE 10 MG: 10 TABLET ORAL at 12:00

## 2023-08-29 RX ADMIN — TAMSULOSIN HYDROCHLORIDE 0.4 MG: 0.4 CAPSULE ORAL at 12:00

## 2023-08-29 RX ADMIN — SODIUM CHLORIDE, PRESERVATIVE FREE 10 ML: 5 INJECTION INTRAVENOUS at 14:43

## 2023-08-29 RX ADMIN — LABETALOL HYDROCHLORIDE 10 MG: 5 INJECTION, SOLUTION INTRAVENOUS at 06:43

## 2023-08-29 RX ADMIN — HEPARIN SODIUM 5000 UNITS: 5000 INJECTION INTRAVENOUS; SUBCUTANEOUS at 20:17

## 2023-08-29 RX ADMIN — HEPARIN SODIUM 5000 UNITS: 5000 INJECTION INTRAVENOUS; SUBCUTANEOUS at 06:42

## 2023-08-29 RX ADMIN — METOPROLOL SUCCINATE 25 MG: 25 TABLET, EXTENDED RELEASE ORAL at 12:00

## 2023-08-29 NOTE — PROGRESS NOTES
Pt arrived to room 3306 from the ED. Pt A&Ox4, oriented to room and call light in reach. Spann in place and below waist level.

## 2023-08-29 NOTE — CONSULTS
Assessment & Plan:     ERIN  Obstructive Nephropathy  Prostate enlargement causing bladder outlet obstruction and bilateral hydronephrosis. Creatinine 3.5 this morning from baseline of 1. Labs on 8/28 showed creatinine of 2. Spann with good output, 5L out since admission.  - Maintain Spann catheter  - Monitor strict I/Os, daily weights    BPH  For past year. Had urosling procedure done in 2021, however with persistent symptoms. Previously failed Flomax.  - Requires further management of prostate enlargement by urology  - Urology consulted-appreciate recommendations    Hypertension  Patient has longstanding hypertension, at home he is on amlodipine 10 Mg daily as well as irbesartan. - Start Toprol XL 25 mg daily (Ordered)  - Restart amlodipine 10 mg daily  - Hold irbesartan in light of kidney injury  - Avoid other diuretics    Hypercalcemia  Calcium 11.4, elevated. Newly elevated compared to previous results. - Follow-up SPEP, UPEP, free light chains    Mild Anemia  Appears chronic. Hemoglobin 12.5 this admission.  - Continue to monitor with daily [address] Nephrology would like to thank Porfirio Palmer MD for the opportunity to serve this patient. Please call with questions at 24 Hrs Answering service [phone] or 7 am - 5 pm via Perfect serve or cell phone of Dr. James Mcgregor. CC/Reason for Consult:     Abnormal labs. HPI :     Mckenna Bermudez is a 75 y.o. male with PMH of CVA (R sided weakness), T2DM, HTN, HLD who presents after being advised after abnormal labs. He said he had initially gone to see his doctor after a L toe injury. His primary care physician did labs and noted the creatinine elevation and referred him for a bladder scan. Upon results of the bladder scan showing severe bladder distention, patient was advised within an hour to present to the ED for Spann placement. On ROS, patient endorses he had been feeling poorly for the past few months.   He endorses

## 2023-08-29 NOTE — CONSULTS
Consult received. Labs and notes were reviewed. Case was discussed with the staff. Full note to follow.     Thanks  Nephrology  [address] [facility],[address] [id number] [facility], [address] [place] [address] Avenue  Office: [phone]  Cell: [phone]  Fax: [phone]

## 2023-08-29 NOTE — CARE COORDINATION
Case Management Assessment  Initial Evaluation    Date/Time of Evaluation: 8/29/2023 3:47 PM  Assessment Completed by: RUEL Wilson, CONCHITAW    If patient is discharged prior to next notation, then this note serves as note for discharge by case management. Patient Name: Stef Yo                   YOB: 1950  Diagnosis: Obstructive nephropathy [N13.8]                   Date / Time: 8/28/2023  8:14 PM    Patient Admission Status: Inpatient   Readmission Risk (Low < 19, Mod (19-27), High > 27): Readmission Risk Score: 14.3    Current PCP: Renetta Ruiz MD  PCP verified by CM? No    Chart Reviewed: Yes      History Provided by: Patient, Spouse, Medical Record  Patient Orientation: Alert and Oriented    Patient Cognition: Alert    Hospitalization in the last 30 days (Readmission):  No    If yes, Readmission Assessment in  Navigator will be completed. Advance Directives:      Code Status: Full Code   Patient's Primary Decision Maker is: Legal Next of Kin      Discharge Planning:    Patient lives with:   Type of Home: House  Primary Care Giver: Self  Patient Support Systems include: Spouse/Significant Other   Current Financial resources: Medicare  Current community resources:    Current services prior to admission: Durable Medical Equipment            Current DME: [facility]ane            Type of Home Care services:       ADLS  Prior functional level: Independent in ADLs/IADLs  Current functional level: Independent in ADLs/IADLs    PT AM-PAC:   /24  OT AM-PAC:   /24    Family can provide assistance at DC: Yes  Would you like Case Management to discuss the discharge plan with any other family members/significant others, and if so, who?  Yes (wife)  Plans to Return to Present Housing: Yes  Other Identified Issues/Barriers to RETURNING to current housing: med stability   Potential Assistance needed at discharge: N/A            Potential DME:    Patient expects to discharge to: [facility] Insurance for quality data associated with the providers was provided to: Patient   Patient Representative Name:       The Patient and/or Patient Representative Agree with the Discharge Plan?  Yes    RUEL Rodriguez, [address] [facility]  Case Management Department  Ph: [phone]

## 2023-08-29 NOTE — PROGRESS NOTES
Hospitalist Progress Note      Name:  Hao Huggins /Age/Sex: 1950  (75 y.o. male)   MRN & CSN:  [id number] & [id number] Admission Date/Time: 2023  8:14 PM   Location:  [place] [address]- PCP: Dionicio Earl MD         Hospital Day: 2    Assessment and Plan:   Hao Huggins is a 75 y.o.  male  who presents with Obstructive nephropathy    Acute renal injury--prior normal kidney function likely secondary to obstructive uropathy, has some hematuria, Spann in place greater than 2 L came out, urology consulted and nephrology consulted; rating admission 4.3 down to 3.5  Obstructive uropathy, currently have a Spann in place; monitor renal function; continue Flomax  Hypercalcemia--calcium 11.4 continue to monitor suspect likely secondary to volume changes  Hematuria--- secondary to likely traumatic Spann  Essential hypertension--- blood pressure control, Norvasc, continue to monitor    Diet ADULT DIET; Regular; 3 carb choices (45 gm/meal)   DVT Prophylaxis [] Lovenox, [x]  Heparin, [x] SCDs, [] Ambulation   GI Prophylaxis [x] PPI,  [] H2 Blocker,  [] Carafate,  [] Diet/Tube Feeds   Code Status Full Code   Disposition Patient requires continued admission due to urinary retention and acute renal failure   MDM [] Low, [] Moderate,[x]  High  Patient's risk as above due to ongoing management with obstructive uropathy     History of Present Illness:     Chief Complaint: Obstructive nephropathy  Hao Huggins is a 75 y.o.  male  who presents with trouble urinating    Reports feeling somewhat little bit better, still have some chills like symptoms, no fever no chest pain shortness of breath he had some nausea and vomited prior but not now, having had a bowel movement about 4 days, no blood that he noted stool in the last time but having blood in his urine now       Ten point ROS reviewed negative, unless as noted above    Objective:      Intake/Output Summary (Last 24 hours) at 2023 1351  Last data filed at

## 2023-08-30 LAB
ALBUMIN SERPL ELPH-MCNC: 2.8 G/DL (ref 3.1–4.9)
ALBUMIN SERPL-MCNC: 3.2 G/DL (ref 3.4–5)
ALPHA1 GLOB SERPL ELPH-MCNC: 0.4 G/DL (ref 0.2–0.4)
ALPHA2 GLOB SERPL ELPH-MCNC: 1.1 G/DL (ref 0.4–1.1)
ANION GAP SERPL CALCULATED.3IONS-SCNC: 14 MMOL/L (ref 3–16)
B-GLOBULIN SERPL ELPH-MCNC: 1.1 G/DL (ref 0.9–1.6)
BUN SERPL-MCNC: 40 MG/DL (ref 7–20)
CALCIUM SERPL-MCNC: 9.6 MG/DL (ref 8.3–10.6)
CHLORIDE SERPL-SCNC: 94 MMOL/L (ref 99–110)
CO2 SERPL-SCNC: 29 MMOL/L (ref 21–32)
CREAT SERPL-MCNC: 2.7 MG/DL (ref 0.8–1.3)
GAMMA GLOB SERPL ELPH-MCNC: 1.5 G/DL (ref 0.6–1.8)
GFR SERPLBLD CREATININE-BSD FMLA CKD-EPI: 24 ML/MIN/{1.73_M2}
GLUCOSE SERPL-MCNC: 251 MG/DL (ref 70–99)
PHOSPHATE SERPL-MCNC: 3.6 MG/DL (ref 2.5–4.9)
POTASSIUM SERPL-SCNC: 3.2 MMOL/L (ref 3.5–5.1)
PROT SERPL-MCNC: 6.8 G/DL (ref 6.4–8.2)
PTH-INTACT SERPL-MCNC: 21.4 PG/ML (ref 14–72)
SODIUM SERPL-SCNC: 137 MMOL/L (ref 136–145)
SPE/IFE INTERPRETATION: NORMAL

## 2023-08-30 PROCEDURE — 6360000002 HC RX W HCPCS: Performed by: SURGERY

## 2023-08-30 PROCEDURE — 80069 RENAL FUNCTION PANEL: CPT

## 2023-08-30 PROCEDURE — 2580000003 HC RX 258: Performed by: SURGERY

## 2023-08-30 PROCEDURE — 6370000000 HC RX 637 (ALT 250 FOR IP): Performed by: PHYSICIAN ASSISTANT

## 2023-08-30 PROCEDURE — 1200000000 HC SEMI PRIVATE

## 2023-08-30 PROCEDURE — 2580000003 HC RX 258: Performed by: INTERNAL MEDICINE

## 2023-08-30 PROCEDURE — 6370000000 HC RX 637 (ALT 250 FOR IP): Performed by: HOSPITALIST

## 2023-08-30 PROCEDURE — 36415 COLL VENOUS BLD VENIPUNCTURE: CPT

## 2023-08-30 PROCEDURE — 6370000000 HC RX 637 (ALT 250 FOR IP)

## 2023-08-30 PROCEDURE — 6370000000 HC RX 637 (ALT 250 FOR IP): Performed by: SURGERY

## 2023-08-30 RX ORDER — TROSPIUM CHLORIDE 20 MG/1
20 TABLET, FILM COATED ORAL
Status: DISCONTINUED | OUTPATIENT
Start: 2023-08-30 | End: 2023-08-31 | Stop reason: HOSPADM

## 2023-08-30 RX ORDER — SODIUM CHLORIDE 9 MG/ML
INJECTION, SOLUTION INTRAVENOUS CONTINUOUS
Status: DISCONTINUED | OUTPATIENT
Start: 2023-08-30 | End: 2023-08-31

## 2023-08-30 RX ORDER — DOXYCYCLINE 50 MG/1
100 CAPSULE ORAL 2 TIMES DAILY
Status: DISCONTINUED | OUTPATIENT
Start: 2023-08-30 | End: 2023-08-31 | Stop reason: HOSPADM

## 2023-08-30 RX ORDER — POTASSIUM CHLORIDE 20 MEQ/1
40 TABLET, EXTENDED RELEASE ORAL ONCE
Status: COMPLETED | OUTPATIENT
Start: 2023-08-30 | End: 2023-08-30

## 2023-08-30 RX ORDER — DOXYCYCLINE HYCLATE 100 MG/1
100 CAPSULE ORAL 2 TIMES DAILY
COMMUNITY
End: 2023-09-21 | Stop reason: ALTCHOICE

## 2023-08-30 RX ADMIN — ACETAMINOPHEN 650 MG: 325 TABLET ORAL at 08:31

## 2023-08-30 RX ADMIN — POTASSIUM CHLORIDE 40 MEQ: 1500 TABLET, EXTENDED RELEASE ORAL at 15:07

## 2023-08-30 RX ADMIN — TAMSULOSIN HYDROCHLORIDE 0.4 MG: 0.4 CAPSULE ORAL at 08:23

## 2023-08-30 RX ADMIN — HYDROCODONE BITARTRATE AND ACETAMINOPHEN 1 TABLET: 5; 325 TABLET ORAL at 10:05

## 2023-08-30 RX ADMIN — METOPROLOL SUCCINATE 25 MG: 25 TABLET, EXTENDED RELEASE ORAL at 08:23

## 2023-08-30 RX ADMIN — HEPARIN SODIUM 5000 UNITS: 5000 INJECTION INTRAVENOUS; SUBCUTANEOUS at 06:06

## 2023-08-30 RX ADMIN — TROSPIUM CHLORIDE 20 MG: 20 TABLET, FILM COATED ORAL at 15:07

## 2023-08-30 RX ADMIN — AMLODIPINE BESYLATE 10 MG: 10 TABLET ORAL at 08:23

## 2023-08-30 RX ADMIN — SODIUM CHLORIDE, PRESERVATIVE FREE 10 ML: 5 INJECTION INTRAVENOUS at 08:30

## 2023-08-30 RX ADMIN — DOXYCYCLINE 100 MG: 50 CAPSULE ORAL at 11:44

## 2023-08-30 RX ADMIN — HEPARIN SODIUM 5000 UNITS: 5000 INJECTION INTRAVENOUS; SUBCUTANEOUS at 15:07

## 2023-08-30 RX ADMIN — HEPARIN SODIUM 5000 UNITS: 5000 INJECTION INTRAVENOUS; SUBCUTANEOUS at 22:07

## 2023-08-30 RX ADMIN — SODIUM CHLORIDE: 9 INJECTION, SOLUTION INTRAVENOUS at 10:02

## 2023-08-30 RX ADMIN — DOXYCYCLINE 100 MG: 50 CAPSULE ORAL at 22:06

## 2023-08-30 ASSESSMENT — PAIN SCALES - GENERAL
PAINLEVEL_OUTOF10: 7
PAINLEVEL_OUTOF10: 5
PAINLEVEL_OUTOF10: 0
PAINLEVEL_OUTOF10: 0

## 2023-08-30 ASSESSMENT — PAIN DESCRIPTION - DESCRIPTORS
DESCRIPTORS: ACHING
DESCRIPTORS: ACHING;DISCOMFORT

## 2023-08-30 ASSESSMENT — PAIN DESCRIPTION - ORIENTATION
ORIENTATION: LEFT
ORIENTATION: LEFT

## 2023-08-30 ASSESSMENT — PAIN DESCRIPTION - LOCATION
LOCATION: FOOT;ANKLE
LOCATION: ANKLE;FOOT

## 2023-08-30 NOTE — PROGRESS NOTES
Hospitalist Progress Note      Name:  Mckenna Bermudez /Age/Sex: 1950  (75 y.o. male)   MRN & CSN:  [id number] & [id number] Admission Date/Time: 2023  8:14 PM   Location:  [facility]- PCP: Keiry Toribio MD         Hospital Day: 3    Assessment and Plan:   Mckenna Bermudez is a 75 y.o.  male  who presents with Obstructive nephropathy    Acute renal injury--prior normal kidney function likely secondary to obstructive uropathy, creatinine was 4.3 on admission, currently at 2.7, baseline is 0.9-1.0 continue with IV fluid hydration appreciate nephrology input; kappa lambda chain was abnormal but can be abnormal in the setting of renal abnormalities  Obstructive uropathy-- currently have a Spann in place;  continue Flomax; better with the Spann in place, bleeding has improved as well  Hypercalcemia--calcium back to normal with fluids  Hematuria--- secondary to likely traumatic Spann; bleeding has improved,  Essential hypertension--- blood pressure control, Norvasc, continue to monitor  Recent urinary tract infection--- patient is on a course of doxycycline to finish off the course    Diet ADULT DIET; Regular; 3 carb choices (45 gm/meal)   DVT Prophylaxis [] Lovenox, [x]  Heparin, [x] SCDs, [] Ambulation   GI Prophylaxis [x] PPI,  [] H2 Blocker,  [] Carafate,  [] Diet/Tube Feeds   Code Status Full Code   Disposition Patient requires continued admission due to urinary retention and acute renal failure   MDM [] Low, [] Moderate,[x]  High  Patient's risk as above due to ongoing management with obstructive uropathy     History of Present Illness:     Chief Complaint: Obstructive nephropathy  Mckenna Bermudez is a 75 y.o.  male  who presents with trouble urinating    He slept quite a bit last night, no chest pain or shortness of breath breath, bleeding has improved, pain is controlled       Ten point ROS reviewed negative, unless as noted above    Objective:      Intake/Output Summary (Last 24 hours) at 8/30/2023 1354  Last data filed at 8/30/2023 0937  Gross per 24 hour   Intake 240 ml   Output 2250 ml   Net -2010 ml        Vitals:   Vitals:    08/30/23 1035   BP:    Pulse:    Resp: 16   Temp:    SpO2:      Physical Exam:    Physical Exam  Constitutional:       Appearance: Normal appearance. HENT:      Right Ear: Tympanic membrane normal.      Nose: Nose normal.      Mouth/Throat:      Mouth: Mucous membranes are moist.   Eyes:      Pupils: Pupils are equal, round, and reactive to light. Cardiovascular:      Rate and Rhythm: Normal rate and regular rhythm. Pulses: Normal pulses. Heart sounds: Normal heart sounds. Pulmonary:      Effort: Pulmonary effort is normal.      Breath sounds: Normal breath sounds. Abdominal:      General: Abdomen is flat. Palpations: Abdomen is soft. Genitourinary:     Penis: Normal.       Comments: Catheter in place  Musculoskeletal:         General: Swelling present. Normal range of motion. Cervical back: Normal range of motion. Skin:     General: Skin is warm. Neurological:      General: No focal deficit present. Mental Status: He is alert and oriented to person, place, and time.    Psychiatric:         Mood and Affect: Mood normal.         Behavior: Behavior normal.        Medications:   Medications:    doxycycline monohydrate  100 mg Oral BID    trospium  20 mg Oral BID AC    sodium chloride flush  5-40 mL IntraVENous 2 times per day    heparin (porcine)  5,000 Units SubCUTAneous 3 times per day    amLODIPine  10 mg Oral Daily    tamsulosin  0.4 mg Oral Daily    metoprolol succinate  25 mg Oral Daily      Infusions:    sodium chloride 100 mL/hr at 08/30/23 1002    sodium chloride       PRN Meds: sodium chloride flush, 5-40 mL, PRN  sodium chloride, , PRN  ondansetron, 4 mg, Q8H PRN   Or  ondansetron, 4 mg, Q6H PRN  polyethylene glycol, 17 g, Daily PRN  acetaminophen, 650 mg, Q6H PRN   Or  acetaminophen, 650 mg, Q6H PRN  labetalol, 10 mg, Q4H

## 2023-08-30 NOTE — PROGRESS NOTES
08/30/23 1410   Encounter Summary   Encounter Overview/Reason  Initial Encounter   Service Provided For: Patient and family together   Referral/Consult From: [address] Family members; Restoration/sonal community; Spouse   Last Encounter  08/30/23  (rw)   Complexity of Encounter Low   Begin Time 1405   End Time  1410   Total Time Calculated 5 min   Spiritual/Emotional needs   Type Spiritual Support   Assessment/Intervention/Outcome   Assessment Coping;Calm  ((family))   Intervention Discussed belief system/Amish practices/sonal; Explored/Affirmed feelings, thoughts, concerns;Explored Coping Skills/Resources;Nurtured Hope   Outcome Comfort;Engaged in conversation   Plan and Referrals   Plan/Referrals No future visits requested      visit during rounding. Pt appeared to be resting but pt's spouse welcomed  visit. Pt is Yarsani. His brother, who is a [employer] deacon with [address], came to visit yesterday. No spiritual or emotional needs at this time.      Radha Ruggiero M.Div., [facility]

## 2023-08-30 NOTE — PLAN OF CARE
Problem: Pain  Goal: Verbalizes/displays adequate comfort level or baseline comfort level  Outcome: Progressing  Flowsheets (Taken 8/30/2023 0341)  Verbalizes/displays adequate comfort level or baseline comfort level:   Encourage patient to monitor pain and request assistance   Assess pain using appropriate pain scale     Problem: Chronic Conditions and Co-morbidities  Goal: Patient's chronic conditions and co-morbidity symptoms are monitored and maintained or improved  Outcome: Progressing     Problem: Discharge Planning  Goal: Discharge to home or other facility with appropriate resources  Outcome: Progressing     Problem: Safety - Adult  Goal: Free from fall injury  Outcome: Progressing

## 2023-08-30 NOTE — CONSULTS
Assessment & Plan:     BP is better with urine of 3250 ml     Creatinine is slowly improving 4.3 > 3.5   Calcium is high   Labs pending from today   Urology follow up to differentiate between obstructive   Workup for hypercalcemia ordered   Continue IVF      ERIN  Obstructive Nephropathy  Prostate enlargement causing bladder outlet obstruction and bilateral hydronephrosis. Creatinine  baseline of 1. Labs on 8/28 showed creatinine of 2.  - Maintain Spann catheter  - Monitor strict I/Os, daily weights    BPH  For past year. Had urosling procedure done in 2021, however with persistent symptoms. Previously failed Flomax.  - Requires further management of prostate enlargement by urology  - Urology consulted-appreciate recommendations    Hypertension  Patient has longstanding hypertension, at home he is on amlodipine 10 Mg daily as well as irbesartan. - Start Toprol XL 25 mg daily (Ordered)  - Restart amlodipine 10 mg daily  - Hold irbesartan in light of kidney injury  - Avoid other diuretics    Hypercalcemia  Calcium 11.4, elevated. Newly elevated compared to previous results. - Follow-up SPEP, UPEP, free light chains    Mild Anemia  Appears chronic. Hemoglobin 12.5 this admission.  - Continue to monitor with daily [address] Nephrology would like to thank Irwin Gomez MD for the opportunity to serve this patient. Please call with questions at 24 Hrs Answering service [phone] or 7 am - 5 pm via Perfect serve or cell phone of Dr. Nasreen Mccracken. CC/Reason for Consult:     Abnormal labs. HPI :     Mona Green is a 75 y.o. male with PMH of CVA (R sided weakness), T2DM, HTN, HLD who presents after being advised after abnormal labs. He said he had initially gone to see his doctor after a L toe injury. His primary care physician did labs and noted the creatinine elevation and referred him for a bladder scan.   Upon results of the bladder scan showing severe bladder distention, patient

## 2023-08-31 ENCOUNTER — APPOINTMENT (OUTPATIENT)
Dept: GENERAL RADIOLOGY | Age: 73
DRG: 694 | End: 2023-08-31
Payer: MEDICARE

## 2023-08-31 VITALS
OXYGEN SATURATION: 95 % | HEART RATE: 86 BPM | BODY MASS INDEX: 30.32 KG/M2 | TEMPERATURE: 98 F | RESPIRATION RATE: 16 BRPM | SYSTOLIC BLOOD PRESSURE: 163 MMHG | HEIGHT: 76 IN | WEIGHT: 249 LBS | DIASTOLIC BLOOD PRESSURE: 81 MMHG

## 2023-08-31 LAB
ALBUMIN SERPL-MCNC: 3.1 G/DL (ref 3.4–5)
ANION GAP SERPL CALCULATED.3IONS-SCNC: 12 MMOL/L (ref 3–16)
BUN SERPL-MCNC: 32 MG/DL (ref 7–20)
CALCIUM SERPL-MCNC: 9.1 MG/DL (ref 8.3–10.6)
CHLORIDE SERPL-SCNC: 101 MMOL/L (ref 99–110)
CO2 SERPL-SCNC: 29 MMOL/L (ref 21–32)
CREAT SERPL-MCNC: 2 MG/DL (ref 0.8–1.3)
GFR SERPLBLD CREATININE-BSD FMLA CKD-EPI: 34 ML/MIN/{1.73_M2}
GLUCOSE SERPL-MCNC: 152 MG/DL (ref 70–99)
PHOSPHATE SERPL-MCNC: 3 MG/DL (ref 2.5–4.9)
POTASSIUM SERPL-SCNC: 3.2 MMOL/L (ref 3.5–5.1)
SODIUM SERPL-SCNC: 142 MMOL/L (ref 136–145)

## 2023-08-31 PROCEDURE — 36415 COLL VENOUS BLD VENIPUNCTURE: CPT

## 2023-08-31 PROCEDURE — 6370000000 HC RX 637 (ALT 250 FOR IP): Performed by: HOSPITALIST

## 2023-08-31 PROCEDURE — 6360000002 HC RX W HCPCS: Performed by: SURGERY

## 2023-08-31 PROCEDURE — 6370000000 HC RX 637 (ALT 250 FOR IP): Performed by: INTERNAL MEDICINE

## 2023-08-31 PROCEDURE — 73630 X-RAY EXAM OF FOOT: CPT

## 2023-08-31 PROCEDURE — 6370000000 HC RX 637 (ALT 250 FOR IP): Performed by: PHYSICIAN ASSISTANT

## 2023-08-31 PROCEDURE — 6370000000 HC RX 637 (ALT 250 FOR IP): Performed by: SURGERY

## 2023-08-31 PROCEDURE — 73610 X-RAY EXAM OF ANKLE: CPT

## 2023-08-31 PROCEDURE — 80069 RENAL FUNCTION PANEL: CPT

## 2023-08-31 PROCEDURE — 6370000000 HC RX 637 (ALT 250 FOR IP)

## 2023-08-31 PROCEDURE — 2580000003 HC RX 258: Performed by: SURGERY

## 2023-08-31 RX ORDER — HYDROCODONE BITARTRATE AND ACETAMINOPHEN 5; 325 MG/1; MG/1
1 TABLET ORAL EVERY 6 HOURS PRN
Qty: 20 TABLET | Refills: 0 | Status: SHIPPED | OUTPATIENT
Start: 2023-08-31 | End: 2023-09-05

## 2023-08-31 RX ORDER — METOPROLOL SUCCINATE 25 MG/1
25 TABLET, EXTENDED RELEASE ORAL DAILY
Qty: 30 TABLET | Refills: 3 | Status: ON HOLD | OUTPATIENT
Start: 2023-09-01 | End: 2023-09-22 | Stop reason: SDUPTHER

## 2023-08-31 RX ORDER — TAMSULOSIN HYDROCHLORIDE 0.4 MG/1
0.4 CAPSULE ORAL DAILY
Qty: 30 CAPSULE | Refills: 3 | Status: SHIPPED | OUTPATIENT
Start: 2023-09-01

## 2023-08-31 RX ORDER — POTASSIUM CHLORIDE 20 MEQ/1
40 TABLET, EXTENDED RELEASE ORAL ONCE
Status: COMPLETED | OUTPATIENT
Start: 2023-08-31 | End: 2023-08-31

## 2023-08-31 RX ADMIN — HEPARIN SODIUM 5000 UNITS: 5000 INJECTION INTRAVENOUS; SUBCUTANEOUS at 06:25

## 2023-08-31 RX ADMIN — POTASSIUM CHLORIDE 40 MEQ: 1500 TABLET, EXTENDED RELEASE ORAL at 08:42

## 2023-08-31 RX ADMIN — HEPARIN SODIUM 5000 UNITS: 5000 INJECTION INTRAVENOUS; SUBCUTANEOUS at 16:08

## 2023-08-31 RX ADMIN — METOPROLOL SUCCINATE 25 MG: 25 TABLET, EXTENDED RELEASE ORAL at 08:35

## 2023-08-31 RX ADMIN — AMLODIPINE BESYLATE 10 MG: 10 TABLET ORAL at 08:35

## 2023-08-31 RX ADMIN — SODIUM CHLORIDE, PRESERVATIVE FREE 10 ML: 5 INJECTION INTRAVENOUS at 08:35

## 2023-08-31 RX ADMIN — TROSPIUM CHLORIDE 20 MG: 20 TABLET, FILM COATED ORAL at 16:11

## 2023-08-31 RX ADMIN — TROSPIUM CHLORIDE 20 MG: 20 TABLET, FILM COATED ORAL at 06:24

## 2023-08-31 RX ADMIN — TAMSULOSIN HYDROCHLORIDE 0.4 MG: 0.4 CAPSULE ORAL at 08:35

## 2023-08-31 RX ADMIN — HYDROCODONE BITARTRATE AND ACETAMINOPHEN 2 TABLET: 5; 325 TABLET ORAL at 03:39

## 2023-08-31 RX ADMIN — DOXYCYCLINE 100 MG: 50 CAPSULE ORAL at 08:35

## 2023-08-31 ASSESSMENT — PAIN DESCRIPTION - ONSET
ONSET: ON-GOING

## 2023-08-31 ASSESSMENT — PAIN SCALES - GENERAL
PAINLEVEL_OUTOF10: 3
PAINLEVEL_OUTOF10: 7
PAINLEVEL_OUTOF10: 4
PAINLEVEL_OUTOF10: 4
PAINLEVEL_OUTOF10: 6

## 2023-08-31 ASSESSMENT — PAIN DESCRIPTION - FREQUENCY
FREQUENCY: INTERMITTENT

## 2023-08-31 ASSESSMENT — PAIN DESCRIPTION - LOCATION
LOCATION: FOOT

## 2023-08-31 ASSESSMENT — PAIN DESCRIPTION - PAIN TYPE
TYPE: ACUTE PAIN

## 2023-08-31 ASSESSMENT — PAIN DESCRIPTION - ORIENTATION
ORIENTATION: LEFT

## 2023-08-31 ASSESSMENT — PAIN - FUNCTIONAL ASSESSMENT
PAIN_FUNCTIONAL_ASSESSMENT: ACTIVITIES ARE NOT PREVENTED

## 2023-08-31 ASSESSMENT — PAIN DESCRIPTION - DESCRIPTORS
DESCRIPTORS: THROBBING
DESCRIPTORS: ACHING
DESCRIPTORS: THROBBING

## 2023-08-31 NOTE — CONSULTS
Attending : Little Mccracken MD   Consult Note        Reason for Consult:  left foot pain   Requesting Physician: Anay Elmore MD  Date of Service: 8/31/2023 6:03 PM    CHIEF COMPLAINT:  As Above    History Obtained From:  patient and spouse    HISTORY OF PRESENT ILLNESS:                The patient is a 75 y.o. male who presents with above chief complaint. He has complex medical history including DMtype2 with peripheral neuropathy affecting BLE. He reports more recent onset of midfoot pain over last several days. About two weeks prior he had a crush type injury to his foot resulting in toenail avulsion and reported fracture of great toe distal phalanx   He is currently being treated by podiatry and reportedly just saw podiatrist about 5 days ago, images of foot otherwise reported negative   He is currently admitted for obstructive nephropathy and consult was placed for left foot pain. The patient reports that he has been bearing weight on the foot including over the last several days. He denies any new swelling but has been doing dressing changes and soaks per podiatry recs. No other new changes reported by patient. Past Medical History:        Diagnosis Date    Asthma     BPH     Cataract 1999    Removed 1999 & 2000    Cerebral artery occlusion with cerebral infarction ([address])     Depression     GERD (gastroesophageal reflux disease)     Hyperlipidemia     Hypertension     Type 2 diabetes mellitus ([address])      Past Surgical History:        Procedure Laterality Date    COLONOSCOPY      EYE SURGERY      2000    KNEE SURGERY  2014    LUMBAR [address]      NASAL SEPTUM SURGERY      OTHER SURGICAL HISTORY N/A 12/28/2016    BILATERAL INFERIOR TURBINATE REDUCTION    SPINE SURGERY  2005    Herniated disc         Medications Prior to Admission:   Prior to Admission medications    Medication Sig Start Date End Date Taking?  Authorizing Provider   tamsulosin (FLOMAX) 0.4 MG capsule Take 1 capsule by mouth daily 9/1/23 VIEWS)   Final Result   No acute osseous injury. IMPRESSION/RECOMMENDATIONS:    Assessment: 74 y/o M with history of left foot and ankle pain, history of subacute trauma with crush injury two weeks ago  Suspect midfoot/ankle sprain with associated great toe nail avulsion     Plan:  1) low suspicion for fracture based on report from outside of foot demonstrating no fracture six days ago. The pateint has been weightbearing on the foot for two weeks with no increased significant swelling. Will plan to repeat foot and ankle xrays while inpatient to rule out fracture, if no fracture ok to continue weight bear as tolerated, if continued pain consider boot or additional support as outpatient. Patient has established podiatrist whom he recently saw last week and is actively being treated for nail injury and distal phalanx fracture  Recommend scheduled followup with podiatrist and patient will plan to discuss any new foot pain with podiatrist at that visit. Patient will continue with compression and soaks for foot as per previous. Addendum: images reviewed of left foot and ankle , agree with above findings demonstrating no obvious acute fracture or dislocation, degenerative changes and calcification at medial tibiotalar joint, possible nondisplaced fracture great toe distal phalanx. Plan to proceed with plan as listed above with no additional injury noted. Thank you for the opportunity to consult on this patient.     MD Fernando Olivares MD

## 2023-08-31 NOTE — PROGRESS NOTES
Pt in bed, awake watching television. Alert & oriented x 4. BP elevated, other VSS. Assessment completed. Complaining of mild pain left foot which was injured, no medication required at this time. IVF infusing. Spann in place draining clear yellow urine. Nighttime medication given, pt tolerated well. Reminded pt to call for assistance with any needs. Call light within reach.  Safety measures in place,

## 2023-08-31 NOTE — PLAN OF CARE
Problem: Pain  Goal: Verbalizes/displays adequate comfort level or baseline comfort level  Outcome: Progressing  Flowsheets (Taken 8/31/2023 0315)  Verbalizes/displays adequate comfort level or baseline comfort level:   Encourage patient to monitor pain and request assistance   Assess pain using appropriate pain scale     Problem: Chronic Conditions and Co-morbidities  Goal: Patient's chronic conditions and co-morbidity symptoms are monitored and maintained or improved  Flowsheets (Taken 8/31/2023 0315)  Care Plan - Patient's Chronic Conditions and Co-Morbidity Symptoms are Monitored and Maintained or Improved:   Monitor and assess patient's chronic conditions and comorbid symptoms for stability, deterioration, or improvement   Collaborate with multidisciplinary team to address chronic and comorbid conditions and prevent exacerbation or deterioration     Problem: Discharge Planning  Goal: Discharge to home or other facility with appropriate resources  Flowsheets (Taken 8/31/2023 0315)  Discharge to home or other facility with appropriate resources:   Identify barriers to discharge with patient and caregiver   Identify discharge learning needs (meds, wound care, etc)

## 2023-08-31 NOTE — PLAN OF CARE
Problem: Pain  Goal: Verbalizes/displays adequate comfort level or baseline comfort level  Outcome: Adequate for Discharge     Problem: Chronic Conditions and Co-morbidities  Goal: Patient's chronic conditions and co-morbidity symptoms are monitored and maintained or improved  Outcome: Adequate for Discharge     Problem: Discharge Planning  Goal: Discharge to home or other facility with appropriate resources  Outcome: Adequate for Discharge     Problem: Safety - Adult  Goal: Free from fall injury  Outcome: Adequate for Discharge

## 2023-08-31 NOTE — CONSULTS
Assessment & Plan:     BP is better with urine of 3550 ml     Creatinine is slowly improving 4.3 > 3.5 > 2.0  Calcium is better     Urology follow up for obstruction   SPEP/ UPEP pending. PTH is suppressed   Continue IVF  Replace KCL   Continue current BP medicines   OK to DC with brooks and urology follow up. ERIN  Obstructive Nephropathy  Prostate enlargement causing bladder outlet obstruction and bilateral hydronephrosis. Creatinine  baseline of 1. Labs on 8/28 showed creatinine of 2.  - Maintain Brooks catheter  - Monitor strict I/Os, daily weights    BPH  For past year. Had urosling procedure done in 2021, however with persistent symptoms. Previously failed Flomax.  - Requires further management of prostate enlargement by urology  - Urology consulted-appreciate recommendations    Hypertension  Patient has longstanding hypertension, at home he is on amlodipine 10 Mg daily as well as irbesartan. - Start Toprol XL 25 mg daily (Ordered)  - Restart amlodipine 10 mg daily  - Hold irbesartan in light of kidney injury  - Avoid other diuretics    Hypercalcemia  Calcium 11.4, elevated. Newly elevated compared to previous results. - Follow-up SPEP, UPEP, free light chains    Mild Anemia  Appears chronic. Hemoglobin 12.5 this admission.  - Continue to monitor with daily [address] Nephrology would like to thank Too Robert MD for the opportunity to serve this patient. Please call with questions at 24 Hrs Answering service [phone] or 7 am - 5 pm via Perfect serve or cell phone of Dr. Nallely Watson. CC/Reason for Consult:     Abnormal labs. HPI :     Lila Salazar is a 75 y.o. male with PMH of CVA (R sided weakness), T2DM, HTN, HLD who presents after being advised after abnormal labs. He said he had initially gone to see his doctor after a L toe injury. His primary care physician did labs and noted the creatinine elevation and referred him for a bladder scan.   Upon results of

## 2023-08-31 NOTE — PROGRESS NOTES
Pt d/c'd 8/31/23. IV access removed with no complications. Reviewed d/c instructions, home meds, and f/u information utilizing teach-back method. Patient verbalized understanding. Patient assisted to lobby in wheelchair and left with all documented belongings.

## 2023-09-01 LAB
KAPPA LC FREE SER-MCNC: 108.36 MG/L (ref 3.3–19.4)
KAPPA LC FREE/LAMBDA FREE SER: 2.3 {RATIO} (ref 0.26–1.65)
LAMBDA LC FREE SERPL-MCNC: 47.15 MG/L (ref 5.71–26.3)
RPT COMMENT: ABNORMAL

## 2023-09-21 ENCOUNTER — HOSPITAL ENCOUNTER (INPATIENT)
Age: 73
LOS: 1 days | Discharge: HOME | DRG: 309 | End: 2023-09-22
Attending: EMERGENCY MEDICINE | Admitting: STUDENT IN AN ORGANIZED HEALTH CARE EDUCATION/TRAINING PROGRAM
Payer: MEDICARE

## 2023-09-21 ENCOUNTER — APPOINTMENT (OUTPATIENT)
Dept: GENERAL RADIOLOGY | Age: 73
DRG: 309 | End: 2023-09-21
Payer: MEDICARE

## 2023-09-21 DIAGNOSIS — I48.91 ATRIAL FIBRILLATION WITH RVR (HCC): Primary | ICD-10-CM

## 2023-09-21 LAB
ANION GAP SERPL CALCULATED.3IONS-SCNC: 15 MMOL/L (ref 3–16)
ANTI-XA UNFRAC HEPARIN: 0.25 IU/ML (ref 0.3–0.7)
APTT BLD: 29.9 SEC (ref 22.7–35.9)
BASOPHILS # BLD: 0 K/UL (ref 0–0.2)
BASOPHILS NFR BLD: 0.4 %
BUN SERPL-MCNC: 18 MG/DL (ref 7–20)
CALCIUM SERPL-MCNC: 10.2 MG/DL (ref 8.3–10.6)
CHLORIDE SERPL-SCNC: 96 MMOL/L (ref 99–110)
CO2 SERPL-SCNC: 27 MMOL/L (ref 21–32)
CREAT SERPL-MCNC: 1.1 MG/DL (ref 0.8–1.3)
DEPRECATED RDW RBC AUTO: 14 % (ref 12.4–15.4)
EKG ATRIAL RATE: 192 BPM
EKG DIAGNOSIS: NORMAL
EKG Q-T INTERVAL: 296 MS
EKG QRS DURATION: 88 MS
EKG QTC CALCULATION (BAZETT): 469 MS
EKG R AXIS: 13 DEGREES
EKG T AXIS: 90 DEGREES
EKG VENTRICULAR RATE: 151 BPM
EOSINOPHIL # BLD: 0.2 K/UL (ref 0–0.6)
EOSINOPHIL NFR BLD: 3.7 %
GFR SERPLBLD CREATININE-BSD FMLA CKD-EPI: >60 ML/MIN/{1.73_M2}
GLUCOSE BLD-MCNC: 107 MG/DL (ref 70–99)
GLUCOSE SERPL-MCNC: 178 MG/DL (ref 70–99)
HCT VFR BLD AUTO: 35.4 % (ref 40.5–52.5)
HGB BLD-MCNC: 11.9 G/DL (ref 13.5–17.5)
INR PPP: 1.02 (ref 0.84–1.16)
LYMPHOCYTES # BLD: 1.1 K/UL (ref 1–5.1)
LYMPHOCYTES NFR BLD: 17.5 %
MAGNESIUM SERPL-MCNC: 1.3 MG/DL (ref 1.8–2.4)
MCH RBC QN AUTO: 29.7 PG (ref 26–34)
MCHC RBC AUTO-ENTMCNC: 33.6 G/DL (ref 31–36)
MCV RBC AUTO: 88.4 FL (ref 80–100)
MONOCYTES # BLD: 0.5 K/UL (ref 0–1.3)
MONOCYTES NFR BLD: 8.1 %
NEUTROPHILS # BLD: 4.6 K/UL (ref 1.7–7.7)
NEUTROPHILS NFR BLD: 70.3 %
NT-PROBNP SERPL-MCNC: 422 PG/ML (ref 0–449)
PERFORMED ON: ABNORMAL
PLATELET # BLD AUTO: 251 K/UL (ref 135–450)
PMV BLD AUTO: 7.7 FL (ref 5–10.5)
POTASSIUM SERPL-SCNC: 4.1 MMOL/L (ref 3.5–5.1)
PROTHROMBIN TIME: 13.4 SEC (ref 11.5–14.8)
RBC # BLD AUTO: 4.01 M/UL (ref 4.2–5.9)
SODIUM SERPL-SCNC: 138 MMOL/L (ref 136–145)
TROPONIN, HIGH SENSITIVITY: 25 NG/L (ref 0–22)
TROPONIN, HIGH SENSITIVITY: 26 NG/L (ref 0–22)
TROPONIN, HIGH SENSITIVITY: 66 NG/L (ref 0–22)
WBC # BLD AUTO: 6.5 K/UL (ref 4–11)

## 2023-09-21 PROCEDURE — 99285 EMERGENCY DEPT VISIT HI MDM: CPT

## 2023-09-21 PROCEDURE — 2060000000 HC ICU INTERMEDIATE R&B

## 2023-09-21 PROCEDURE — 6360000002 HC RX W HCPCS: Performed by: PHYSICIAN ASSISTANT

## 2023-09-21 PROCEDURE — 85025 COMPLETE CBC W/AUTO DIFF WBC: CPT

## 2023-09-21 PROCEDURE — 2500000003 HC RX 250 WO HCPCS: Performed by: STUDENT IN AN ORGANIZED HEALTH CARE EDUCATION/TRAINING PROGRAM

## 2023-09-21 PROCEDURE — 85520 HEPARIN ASSAY: CPT

## 2023-09-21 PROCEDURE — 80048 BASIC METABOLIC PNL TOTAL CA: CPT

## 2023-09-21 PROCEDURE — 84443 ASSAY THYROID STIM HORMONE: CPT

## 2023-09-21 PROCEDURE — 85730 THROMBOPLASTIN TIME PARTIAL: CPT

## 2023-09-21 PROCEDURE — 93005 ELECTROCARDIOGRAM TRACING: CPT | Performed by: STUDENT IN AN ORGANIZED HEALTH CARE EDUCATION/TRAINING PROGRAM

## 2023-09-21 PROCEDURE — 2580000003 HC RX 258: Performed by: STUDENT IN AN ORGANIZED HEALTH CARE EDUCATION/TRAINING PROGRAM

## 2023-09-21 PROCEDURE — 2500000003 HC RX 250 WO HCPCS: Performed by: EMERGENCY MEDICINE

## 2023-09-21 PROCEDURE — 83880 ASSAY OF NATRIURETIC PEPTIDE: CPT

## 2023-09-21 PROCEDURE — 83735 ASSAY OF MAGNESIUM: CPT

## 2023-09-21 PROCEDURE — 84484 ASSAY OF TROPONIN QUANT: CPT

## 2023-09-21 PROCEDURE — 36415 COLL VENOUS BLD VENIPUNCTURE: CPT

## 2023-09-21 PROCEDURE — 6360000002 HC RX W HCPCS: Performed by: STUDENT IN AN ORGANIZED HEALTH CARE EDUCATION/TRAINING PROGRAM

## 2023-09-21 PROCEDURE — 6370000000 HC RX 637 (ALT 250 FOR IP): Performed by: STUDENT IN AN ORGANIZED HEALTH CARE EDUCATION/TRAINING PROGRAM

## 2023-09-21 PROCEDURE — 85610 PROTHROMBIN TIME: CPT

## 2023-09-21 PROCEDURE — 71045 X-RAY EXAM CHEST 1 VIEW: CPT

## 2023-09-21 PROCEDURE — 93005 ELECTROCARDIOGRAM TRACING: CPT | Performed by: EMERGENCY MEDICINE

## 2023-09-21 PROCEDURE — 96374 THER/PROPH/DIAG INJ IV PUSH: CPT

## 2023-09-21 RX ORDER — M-VIT,TX,IRON,MINS/CALC/FOLIC 27MG-0.4MG
1 TABLET ORAL DAILY
COMMUNITY

## 2023-09-21 RX ORDER — DILTIAZEM HYDROCHLORIDE 60 MG/1
30 TABLET, FILM COATED ORAL EVERY 6 HOURS SCHEDULED
Status: CANCELLED | OUTPATIENT
Start: 2023-09-21

## 2023-09-21 RX ORDER — HEPARIN SODIUM 10000 [USP'U]/100ML
5-30 INJECTION, SOLUTION INTRAVENOUS CONTINUOUS
Status: DISCONTINUED | OUTPATIENT
Start: 2023-09-21 | End: 2023-09-22

## 2023-09-21 RX ORDER — ASPIRIN 81 MG/1
81 TABLET ORAL DAILY
Status: DISCONTINUED | OUTPATIENT
Start: 2023-09-22 | End: 2023-09-22 | Stop reason: HOSPADM

## 2023-09-21 RX ORDER — HEPARIN SODIUM 1000 [USP'U]/ML
4000 INJECTION, SOLUTION INTRAVENOUS; SUBCUTANEOUS ONCE
Status: COMPLETED | OUTPATIENT
Start: 2023-09-21 | End: 2023-09-21

## 2023-09-21 RX ORDER — INSULIN LISPRO 100 [IU]/ML
0-4 INJECTION, SOLUTION INTRAVENOUS; SUBCUTANEOUS NIGHTLY
Status: DISCONTINUED | OUTPATIENT
Start: 2023-09-21 | End: 2023-09-22 | Stop reason: HOSPADM

## 2023-09-21 RX ORDER — TROSPIUM CHLORIDE 20 MG/1
20 TABLET, FILM COATED ORAL
Status: DISCONTINUED | OUTPATIENT
Start: 2023-09-21 | End: 2023-09-22 | Stop reason: HOSPADM

## 2023-09-21 RX ORDER — ONDANSETRON 4 MG/1
4 TABLET, ORALLY DISINTEGRATING ORAL EVERY 8 HOURS PRN
Status: DISCONTINUED | OUTPATIENT
Start: 2023-09-21 | End: 2023-09-22 | Stop reason: HOSPADM

## 2023-09-21 RX ORDER — ONDANSETRON 2 MG/ML
4 INJECTION INTRAMUSCULAR; INTRAVENOUS EVERY 6 HOURS PRN
Status: DISCONTINUED | OUTPATIENT
Start: 2023-09-21 | End: 2023-09-22 | Stop reason: HOSPADM

## 2023-09-21 RX ORDER — SODIUM CHLORIDE 0.9 % (FLUSH) 0.9 %
5-40 SYRINGE (ML) INJECTION PRN
Status: DISCONTINUED | OUTPATIENT
Start: 2023-09-21 | End: 2023-09-22 | Stop reason: HOSPADM

## 2023-09-21 RX ORDER — METOPROLOL TARTRATE 5 MG/5ML
5 INJECTION INTRAVENOUS ONCE
Status: DISCONTINUED | OUTPATIENT
Start: 2023-09-21 | End: 2023-09-21

## 2023-09-21 RX ORDER — METOPROLOL TARTRATE 5 MG/5ML
5 INJECTION INTRAVENOUS ONCE
Status: COMPLETED | OUTPATIENT
Start: 2023-09-21 | End: 2023-09-21

## 2023-09-21 RX ORDER — DEXTROSE MONOHYDRATE 100 MG/ML
INJECTION, SOLUTION INTRAVENOUS CONTINUOUS PRN
Status: DISCONTINUED | OUTPATIENT
Start: 2023-09-21 | End: 2023-09-22 | Stop reason: HOSPADM

## 2023-09-21 RX ORDER — HEPARIN SODIUM 1000 [USP'U]/ML
4000 INJECTION, SOLUTION INTRAVENOUS; SUBCUTANEOUS PRN
Status: DISCONTINUED | OUTPATIENT
Start: 2023-09-21 | End: 2023-09-22

## 2023-09-21 RX ORDER — IRBESARTAN 300 MG/1
300 TABLET ORAL DAILY
COMMUNITY

## 2023-09-21 RX ORDER — DILTIAZEM HYDROCHLORIDE 5 MG/ML
10 INJECTION INTRAVENOUS ONCE
Status: COMPLETED | OUTPATIENT
Start: 2023-09-21 | End: 2023-09-21

## 2023-09-21 RX ORDER — HEPARIN SODIUM 1000 [USP'U]/ML
80 INJECTION, SOLUTION INTRAVENOUS; SUBCUTANEOUS ONCE
Status: DISCONTINUED | OUTPATIENT
Start: 2023-09-21 | End: 2023-09-21 | Stop reason: SDUPTHER

## 2023-09-21 RX ORDER — INSULIN LISPRO 100 [IU]/ML
0-4 INJECTION, SOLUTION INTRAVENOUS; SUBCUTANEOUS
Status: DISCONTINUED | OUTPATIENT
Start: 2023-09-21 | End: 2023-09-22 | Stop reason: HOSPADM

## 2023-09-21 RX ORDER — SODIUM CHLORIDE 0.9 % (FLUSH) 0.9 %
5-40 SYRINGE (ML) INJECTION EVERY 12 HOURS SCHEDULED
Status: DISCONTINUED | OUTPATIENT
Start: 2023-09-21 | End: 2023-09-22 | Stop reason: HOSPADM

## 2023-09-21 RX ORDER — TAMSULOSIN HYDROCHLORIDE 0.4 MG/1
0.4 CAPSULE ORAL DAILY
Status: DISCONTINUED | OUTPATIENT
Start: 2023-09-22 | End: 2023-09-22 | Stop reason: HOSPADM

## 2023-09-21 RX ORDER — ACETAMINOPHEN 650 MG/1
650 SUPPOSITORY RECTAL EVERY 6 HOURS PRN
Status: DISCONTINUED | OUTPATIENT
Start: 2023-09-21 | End: 2023-09-22 | Stop reason: HOSPADM

## 2023-09-21 RX ORDER — HEPARIN SODIUM 10000 [USP'U]/100ML
5-30 INJECTION, SOLUTION INTRAVENOUS CONTINUOUS
Status: DISCONTINUED | OUTPATIENT
Start: 2023-09-21 | End: 2023-09-21 | Stop reason: SDUPTHER

## 2023-09-21 RX ORDER — HEPARIN SODIUM 1000 [USP'U]/ML
2000 INJECTION, SOLUTION INTRAVENOUS; SUBCUTANEOUS PRN
Status: DISCONTINUED | OUTPATIENT
Start: 2023-09-21 | End: 2023-09-22

## 2023-09-21 RX ORDER — HEPARIN SODIUM 1000 [USP'U]/ML
80 INJECTION, SOLUTION INTRAVENOUS; SUBCUTANEOUS PRN
Status: DISCONTINUED | OUTPATIENT
Start: 2023-09-21 | End: 2023-09-21 | Stop reason: SDUPTHER

## 2023-09-21 RX ORDER — ATORVASTATIN CALCIUM 40 MG/1
80 TABLET, FILM COATED ORAL NIGHTLY
Status: DISCONTINUED | OUTPATIENT
Start: 2023-09-21 | End: 2023-09-22 | Stop reason: HOSPADM

## 2023-09-21 RX ORDER — SODIUM CHLORIDE 9 MG/ML
INJECTION, SOLUTION INTRAVENOUS PRN
Status: DISCONTINUED | OUTPATIENT
Start: 2023-09-21 | End: 2023-09-22 | Stop reason: HOSPADM

## 2023-09-21 RX ORDER — HEPARIN SODIUM 1000 [USP'U]/ML
40 INJECTION, SOLUTION INTRAVENOUS; SUBCUTANEOUS PRN
Status: DISCONTINUED | OUTPATIENT
Start: 2023-09-21 | End: 2023-09-21 | Stop reason: SDUPTHER

## 2023-09-21 RX ORDER — ACETAMINOPHEN 325 MG/1
650 TABLET ORAL EVERY 6 HOURS PRN
Status: DISCONTINUED | OUTPATIENT
Start: 2023-09-21 | End: 2023-09-22 | Stop reason: HOSPADM

## 2023-09-21 RX ORDER — MAGNESIUM SULFATE IN WATER 40 MG/ML
4000 INJECTION, SOLUTION INTRAVENOUS ONCE
Status: COMPLETED | OUTPATIENT
Start: 2023-09-21 | End: 2023-09-21

## 2023-09-21 RX ORDER — METOPROLOL SUCCINATE 25 MG/1
25 TABLET, EXTENDED RELEASE ORAL DAILY
Status: DISCONTINUED | OUTPATIENT
Start: 2023-09-22 | End: 2023-09-22

## 2023-09-21 RX ORDER — POLYETHYLENE GLYCOL 3350 17 G/17G
17 POWDER, FOR SOLUTION ORAL DAILY PRN
Status: DISCONTINUED | OUTPATIENT
Start: 2023-09-21 | End: 2023-09-22 | Stop reason: HOSPADM

## 2023-09-21 RX ORDER — SODIUM CHLORIDE, SODIUM LACTATE, POTASSIUM CHLORIDE, AND CALCIUM CHLORIDE .6; .31; .03; .02 G/100ML; G/100ML; G/100ML; G/100ML
500 INJECTION, SOLUTION INTRAVENOUS ONCE
Status: COMPLETED | OUTPATIENT
Start: 2023-09-21 | End: 2023-09-21

## 2023-09-21 RX ADMIN — MAGNESIUM SULFATE HEPTAHYDRATE 4000 MG: 40 INJECTION, SOLUTION INTRAVENOUS at 17:58

## 2023-09-21 RX ADMIN — DILTIAZEM HYDROCHLORIDE 7.5 MG/HR: 5 INJECTION, SOLUTION INTRAVENOUS at 19:27

## 2023-09-21 RX ADMIN — SODIUM CHLORIDE, POTASSIUM CHLORIDE, SODIUM LACTATE AND CALCIUM CHLORIDE 500 ML: 600; 310; 30; 20 INJECTION, SOLUTION INTRAVENOUS at 18:01

## 2023-09-21 RX ADMIN — METOPROLOL TARTRATE 5 MG: 1 INJECTION, SOLUTION INTRAVENOUS at 15:57

## 2023-09-21 RX ADMIN — DILTIAZEM HYDROCHLORIDE 10 MG: 5 INJECTION INTRAVENOUS at 18:31

## 2023-09-21 RX ADMIN — HEPARIN SODIUM 2000 UNITS: 1000 INJECTION INTRAVENOUS; SUBCUTANEOUS at 22:08

## 2023-09-21 RX ADMIN — HEPARIN SODIUM 9 UNITS/KG/HR: 10000 INJECTION, SOLUTION INTRAVENOUS at 17:23

## 2023-09-21 RX ADMIN — ATORVASTATIN CALCIUM 80 MG: 40 TABLET, FILM COATED ORAL at 20:32

## 2023-09-21 RX ADMIN — HEPARIN SODIUM 4000 UNITS: 1000 INJECTION INTRAVENOUS; SUBCUTANEOUS at 17:22

## 2023-09-21 RX ADMIN — DILTIAZEM HYDROCHLORIDE 5 MG/HR: 5 INJECTION, SOLUTION INTRAVENOUS at 18:33

## 2023-09-21 ASSESSMENT — ENCOUNTER SYMPTOMS
SHORTNESS OF BREATH: 0
COUGH: 0
BLOOD IN STOOL: 0
WHEEZING: 0
STRIDOR: 0
NAUSEA: 0
VOMITING: 0
CHEST TIGHTNESS: 0
ABDOMINAL PAIN: 0
BACK PAIN: 0
DIARRHEA: 0
EYES NEGATIVE: 1
CONSTIPATION: 0

## 2023-09-21 ASSESSMENT — PAIN - FUNCTIONAL ASSESSMENT: PAIN_FUNCTIONAL_ASSESSMENT: 0-10

## 2023-09-21 ASSESSMENT — PAIN SCALES - GENERAL: PAINLEVEL_OUTOF10: 0

## 2023-09-21 NOTE — H&P
COMPARISON: 7/29/2018 FINDINGS: SUPPORT DEVICES: None HEART / MEDIASTINUM: Normal in size. LUNGS/PLEURA: Lungs are clear. No evidence of pneumothorax. BONES / SOFT TISSUES: No acute abnormality. OTHER: None. 1.  No acute disease.        Personally reviewed Lab Studies, Imaging, and discussed case with pt    Electronically signed by Nestor Osorio MD on 9/21/2023 at 5:59 PM

## 2023-09-21 NOTE — ED PROVIDER NOTES
Date of evaluation: 9/21/2023    This patient was seen by the advance practice provider. I have seen and examined the patient, agree with the workup, evaluation, management and diagnosis. The care plan has been discussed. I have reviewed the ECG and concur with the COLTON's interpretation. My assessment reveals well-appearing male in no acute distress comes in for atrial fibrillation. He endorses that he has had intermittent palpitations for the past couple years most recent episode was a month ago. He has a history of a stroke as well. .    General: Well appearing in NAD  HEENT:  head is atraumatic, sclera are clear, oropharynx is nonerythematous, mucus membranes are moist  Neck: Trachea midline  Chest: Nonlabored respirations, clear to auscultation bilaterally  Cardiovascular: Tachycardic with an irregularly irregular rate and rhythm, 2+ radial pulses bilaterally  Abdominal: Nondistended abdomen, soft, nontender without rebound or guarding  Skin: Warm, dry well perfused, no rashes  Extremities: no obvious deformities, no tenderness to palpation diffusely  Neurologic:  Alert and oriented, speech is clear and intact without dysarthria, gait is intact  Psychologic: appropriate mood and affect       Tray An MD  09/21/23 [id number]

## 2023-09-22 VITALS
HEART RATE: 79 BPM | BODY MASS INDEX: 27.52 KG/M2 | RESPIRATION RATE: 18 BRPM | WEIGHT: 225.97 LBS | HEIGHT: 76 IN | DIASTOLIC BLOOD PRESSURE: 91 MMHG | SYSTOLIC BLOOD PRESSURE: 167 MMHG | TEMPERATURE: 98 F | OXYGEN SATURATION: 97 %

## 2023-09-22 LAB
ANION GAP SERPL CALCULATED.3IONS-SCNC: 9 MMOL/L (ref 3–16)
ANTI-XA UNFRAC HEPARIN: 0.28 IU/ML (ref 0.3–0.7)
ANTI-XA UNFRAC HEPARIN: 0.32 IU/ML (ref 0.3–0.7)
BASOPHILS # BLD: 0.1 K/UL (ref 0–0.2)
BASOPHILS NFR BLD: 1.1 %
BUN SERPL-MCNC: 20 MG/DL (ref 7–20)
CALCIUM SERPL-MCNC: 9.6 MG/DL (ref 8.3–10.6)
CHLORIDE SERPL-SCNC: 102 MMOL/L (ref 99–110)
CO2 SERPL-SCNC: 28 MMOL/L (ref 21–32)
CREAT SERPL-MCNC: 1 MG/DL (ref 0.8–1.3)
DEPRECATED RDW RBC AUTO: 14.5 % (ref 12.4–15.4)
EKG ATRIAL RATE: 82 BPM
EKG DIAGNOSIS: NORMAL
EKG P AXIS: 64 DEGREES
EKG P-R INTERVAL: 166 MS
EKG Q-T INTERVAL: 364 MS
EKG QRS DURATION: 86 MS
EKG QTC CALCULATION (BAZETT): 425 MS
EKG R AXIS: -17 DEGREES
EKG T AXIS: 54 DEGREES
EKG VENTRICULAR RATE: 82 BPM
EOSINOPHIL # BLD: 0.3 K/UL (ref 0–0.6)
EOSINOPHIL NFR BLD: 5.2 %
GFR SERPLBLD CREATININE-BSD FMLA CKD-EPI: >60 ML/MIN/{1.73_M2}
GLUCOSE BLD-MCNC: 121 MG/DL (ref 70–99)
GLUCOSE BLD-MCNC: 167 MG/DL (ref 70–99)
GLUCOSE SERPL-MCNC: 128 MG/DL (ref 70–99)
HCT VFR BLD AUTO: 30.2 % (ref 40.5–52.5)
HCT VFR BLD AUTO: 34.4 % (ref 40.5–52.5)
HGB BLD-MCNC: 10.6 G/DL (ref 13.5–17.5)
HGB BLD-MCNC: 11.5 G/DL (ref 13.5–17.5)
IRON SATN MFR SERPL: 16 % (ref 20–50)
IRON SERPL-MCNC: 30 UG/DL (ref 59–158)
LYMPHOCYTES # BLD: 1.5 K/UL (ref 1–5.1)
LYMPHOCYTES NFR BLD: 23.4 %
MAGNESIUM SERPL-MCNC: 2.1 MG/DL (ref 1.8–2.4)
MCH RBC QN AUTO: 30.2 PG (ref 26–34)
MCHC RBC AUTO-ENTMCNC: 35 G/DL (ref 31–36)
MCV RBC AUTO: 86.3 FL (ref 80–100)
MONOCYTES # BLD: 0.7 K/UL (ref 0–1.3)
MONOCYTES NFR BLD: 10.1 %
NEUTROPHILS # BLD: 4 K/UL (ref 1.7–7.7)
NEUTROPHILS NFR BLD: 60.2 %
PERFORMED ON: ABNORMAL
PERFORMED ON: ABNORMAL
PLATELET # BLD AUTO: 222 K/UL (ref 135–450)
PMV BLD AUTO: 7.6 FL (ref 5–10.5)
POTASSIUM SERPL-SCNC: 3.8 MMOL/L (ref 3.5–5.1)
RBC # BLD AUTO: 3.5 M/UL (ref 4.2–5.9)
SODIUM SERPL-SCNC: 139 MMOL/L (ref 136–145)
TIBC SERPL-MCNC: 184 UG/DL (ref 260–445)
TROPONIN, HIGH SENSITIVITY: 176 NG/L (ref 0–22)
TROPONIN, HIGH SENSITIVITY: 176 NG/L (ref 0–22)
TSH SERPL DL<=0.005 MIU/L-ACNC: 1.21 UIU/ML (ref 0.27–4.2)
WBC # BLD AUTO: 6.6 K/UL (ref 4–11)

## 2023-09-22 PROCEDURE — 84484 ASSAY OF TROPONIN QUANT: CPT

## 2023-09-22 PROCEDURE — 6370000000 HC RX 637 (ALT 250 FOR IP): Performed by: HOSPITALIST

## 2023-09-22 PROCEDURE — 83735 ASSAY OF MAGNESIUM: CPT

## 2023-09-22 PROCEDURE — 6360000002 HC RX W HCPCS: Performed by: STUDENT IN AN ORGANIZED HEALTH CARE EDUCATION/TRAINING PROGRAM

## 2023-09-22 PROCEDURE — 85018 HEMOGLOBIN: CPT

## 2023-09-22 PROCEDURE — 83540 ASSAY OF IRON: CPT

## 2023-09-22 PROCEDURE — 80048 BASIC METABOLIC PNL TOTAL CA: CPT

## 2023-09-22 PROCEDURE — C8929 TTE W OR WO FOL WCON,DOPPLER: HCPCS

## 2023-09-22 PROCEDURE — 36415 COLL VENOUS BLD VENIPUNCTURE: CPT

## 2023-09-22 PROCEDURE — 85014 HEMATOCRIT: CPT

## 2023-09-22 PROCEDURE — 6370000000 HC RX 637 (ALT 250 FOR IP): Performed by: STUDENT IN AN ORGANIZED HEALTH CARE EDUCATION/TRAINING PROGRAM

## 2023-09-22 PROCEDURE — 6360000004 HC RX CONTRAST MEDICATION: Performed by: STUDENT IN AN ORGANIZED HEALTH CARE EDUCATION/TRAINING PROGRAM

## 2023-09-22 PROCEDURE — 85025 COMPLETE CBC W/AUTO DIFF WBC: CPT

## 2023-09-22 PROCEDURE — 85520 HEPARIN ASSAY: CPT

## 2023-09-22 PROCEDURE — 83550 IRON BINDING TEST: CPT

## 2023-09-22 PROCEDURE — 2580000003 HC RX 258: Performed by: STUDENT IN AN ORGANIZED HEALTH CARE EDUCATION/TRAINING PROGRAM

## 2023-09-22 RX ORDER — METOPROLOL SUCCINATE 25 MG/1
25 TABLET, EXTENDED RELEASE ORAL 2 TIMES DAILY
Qty: 30 TABLET | Refills: 3 | Status: SHIPPED | OUTPATIENT
Start: 2023-09-22

## 2023-09-22 RX ORDER — AZELASTINE 1 MG/ML
2 SPRAY, METERED NASAL DAILY PRN
Status: DISCONTINUED | OUTPATIENT
Start: 2023-09-22 | End: 2023-09-22

## 2023-09-22 RX ORDER — METOPROLOL SUCCINATE 25 MG/1
25 TABLET, EXTENDED RELEASE ORAL 2 TIMES DAILY
Status: DISCONTINUED | OUTPATIENT
Start: 2023-09-22 | End: 2023-09-22 | Stop reason: HOSPADM

## 2023-09-22 RX ORDER — AMLODIPINE BESYLATE 10 MG/1
10 TABLET ORAL DAILY
Status: DISCONTINUED | OUTPATIENT
Start: 2023-09-22 | End: 2023-09-22 | Stop reason: HOSPADM

## 2023-09-22 RX ADMIN — APIXABAN 5 MG: 5 TABLET, FILM COATED ORAL at 14:21

## 2023-09-22 RX ADMIN — ASPIRIN 81 MG: 81 TABLET, COATED ORAL at 10:14

## 2023-09-22 RX ADMIN — PERFLUTREN 1.5 ML: 6.52 INJECTION, SUSPENSION INTRAVENOUS at 09:31

## 2023-09-22 RX ADMIN — HEPARIN SODIUM 2000 UNITS: 1000 INJECTION INTRAVENOUS; SUBCUTANEOUS at 12:26

## 2023-09-22 RX ADMIN — METOPROLOL SUCCINATE 25 MG: 25 TABLET, EXTENDED RELEASE ORAL at 12:12

## 2023-09-22 RX ADMIN — AMLODIPINE BESYLATE 10 MG: 10 TABLET ORAL at 10:14

## 2023-09-22 RX ADMIN — Medication 10 ML: at 10:14

## 2023-09-22 RX ADMIN — TAMSULOSIN HYDROCHLORIDE 0.4 MG: 0.4 CAPSULE ORAL at 10:14

## 2023-09-22 NOTE — PLAN OF CARE
Problem: Safety - Adult  Goal: Free from fall injury  9/22/2023 1132 by Omkar Myles RN  Outcome: Progressing  Patient has remained free of injury. Will continue to monitor for safety.      Problem: Discharge Planning  Goal: Discharge to home or other facility with appropriate resources  9/22/2023 1132 by Omkar Myles RN  Outcome: Progressing

## 2023-09-22 NOTE — PROGRESS NOTES
Discharge instructions and medications given to patient. Patient verbalizes understanding. Discharged home with self care. Left per family vehicle.

## 2023-09-22 NOTE — PROGRESS NOTES
4 Eyes Skin Assessment     NAME:  Yun Carcamo  YOB: 1950  MEDICAL RECORD NUMBER:  [id number]    The patient is being assessed for  Admission    I agree that at least one RN has performed a thorough Head to Toe Skin Assessment on the patient. ALL assessment sites listed below have been assessed. Areas assessed by both nurses:    Head, Face, Ears, Shoulders, Back, Chest, Arms, Elbows, Hands, Sacrum. Buttock, Coccyx, Ischium, Legs. Feet and Heels, Under Medical Devices , and Other       -Abrasion L great toe         Does the Patient have a Wound?  No noted wound(s)       Buzz Prevention initiated by RN: No  Wound Care Orders initiated by RN: No    Pressure Injury (Stage 3,4, Unstageable, DTI, NWPT, and Complex wounds) if present, place Wound referral order by RN under : No    New Ostomies, if present place, Ostomy referral order under : No     Nurse 1 eSignature: Electronically signed by Tasneem Saez RN on 9/21/23 at 11:35 PM EDT    **SHARE this note so that the co-signing nurse can place an eSignature**    Nurse 2 eSignature: Electronically signed by Barbara Dsouza RN on 7/32/57 at 8:28 PM EDT

## 2023-09-22 NOTE — DISCHARGE INSTR - DIET
Good nutrition is important when healing from an illness, injury, or surgery. Follow any nutrition recommendations given to you during your hospital stay. If you were given an oral nutrition supplement while in the hospital, continue to take this supplement at home. You can take it with meals, in-between meals, and/or before bedtime. These supplements can be purchased at most local grocery stores, pharmacies, and chain [employer]-stores. If you have any questions about your diet or nutrition, call the hospital and ask for the dietitian.   REGULAR, LOW FAT, LOW SALT, LOW CHOLESTEROL, HIGH FIBER, 4 CARB DIET

## 2023-09-22 NOTE — CONSULTS
Clinical Pharmacy Consult Note  Medication History     Admit Date: 9/21/2023    Pharmacy consulted to verify home medication list by Dr. Penny Garrett. List of of current medications patient is taking is complete. Home Medication list in EPIC updated to reflect changes noted below.     Source of information: Patient, Outpatient Fill History, Care Everywhere (09/15/2023 Office Visit at [place], [address])    Patient's home pharmacy: [facility] ([phone])     Changes made to medication list:   Medications removed: (include reason, ex: therapy completed, patient no longer taking, etc.)  Amlodipine 10 MG Tablets - pt reports that their PCP discontinued this medication 1 week ago (~09/14/2023) because it was giving them sporadically low BP (100/70)  Doxycycline Hyclate 100 MG Capsules - pt reports that they took this to prevent infection after a toe injury (nail is still healing); course completed 1 week ago (~09/14/2023)  Irbesartan-HCTZ 300-12.5 MG Tablets - pt reports that this medication was making them urinate too much, so their doctor put them on the version without HCTZ  Mirabegron 25 MG TB24 Tablets - pt reports that this medication was stopped by their Urologist d/t being ineffective for their OAB and expensive  Trospium 20 MG Tablets - pt reports that their Urologist discontinued this medication 1 week ago (~09/14/2023) d/t it being ineffective for their OAB  Medications added:   Multiple Vitamins-Minerals Tablets - Take 1 tablet PO daily, Last Dose: 09/20/2023  Irbesartan 300 MG Tablets - Take 1 tablet PO daily, Last Dose: 09/20/2023  Medication doses adjusted:   Azelastine 0.1% Nasal Spray - instructions changed from \"2 sprays by nasal route 2 times daily (use in each nostril as directed)\" to \"2 sprays by nasal route daily PRN for Rhinitis (Allergies) (use in each nostril as directed)\"  Lansoprazole PO - updated dose from \"take PO as needed\" to \"take 10 mg  PO every morning before breakfast
Interval History and plan:   A-fib RVR  -Continue with rate controlled medication  -Continue with heparin  -Managed by cardiology     Essential HTN  Orthostatic hypotension   -Cont with home medications when medically stable and rate controlled. Normocytic anemia  -Looks to be at his baseline  -We will send iron studies    Type 2 diabetes  -Keep within hospital goals of less than 180  -Managed by primary team                  Assessment :     Patient was admitted for a suspected new onset of atrial fibrillation with RVR she is seen on EKG. He was given 5 mg of IV metoprolol which kept his heart rate below 130s. The lab work was unremarkable other than. Patient was given dilt injection followed by continuous Dilt drip. Current heart rate is below 80s. Can continue home antihypertensive when he is medically stable from the RVR. [facility] would like to thank Jennifer Fleming MD   for opportunity to serve this patient      Please call with questions at-   24 Hrs Answering service [phone] or  7 am- 5 pm via Perfect serve or cell phone  Dr.Mohamed Rashaun Benton MD     HPI :     Jose Carlos Cano is a 75 y.o. male  PMH of BPH, HTN, DM II presented to the hospital for dizziness. This started a few days ago prior to coming to the hospital.  Patient never had like this before. He felt like he was lightheaded, dizziness with passing out but he did not blackout and palpitations with chest pain. Patient did endorse having a stroke 5 years ago with mild right-sided weakness and he has irregular heartbeats since then he claims. No neuro focal deficits patient denies any smoking, alcohol history and no drug abuse. Was admitted in the ED the patient was tachycardic to the 140s his creatinine was 1.1, no leukocytosis and EKG showed A-fib with RVR. Patient was given 1 dose of 5 mg metoprolol.          PMH/PSH/SH/Family History:     Past Medical History:   Diagnosis Date    Asthma     BPH
Abdomen is soft and non-distended. Bowel sounds are normal.   There is no abdominal tenderness. Extremities: There is dependent edema. Pulses: Distal pulses are intact. Neurological: Patient is alert and oriented to person, place and time. Skin:  Warm and dry. Labs     Recent Labs     09/21/23  1451 09/21/23  1640 09/22/23  0417     --  139   K 4.1  --  3.8   BUN 18  --  20   CREATININE 1.1  --  1.0   CL 96*  --  102   CO2 27  --  28   GLUCOSE 178*  --  128*   CALCIUM 10.2  --  9.6   MG  --  1.30* 2.10     Recent Labs     09/21/23  1450 09/22/23  0417   WBC 6.5 6.6   HGB 11.9* 10.6*   HCT 35.4* 30.2*    222   MCV 88.4 86.3     No results for input(s): \"CHOLTOT\", \"TRIG\", \"HDL\", \"CHOLHDL\", \"LDL\" in the last 72 hours. Invalid input(s): \"LIPIDCOMM\", \"VLDCHOL\"  Recent Labs     09/21/23  1450   APTT 29.9   INR 1.02     Recent Labs     09/21/23  1451 09/21/23  1640 09/21/23  1901 09/22/23  0102   TROPHS 25* 26* 66* 176*     Recent Labs     09/21/23  1451   PROBNP 422     No results for input(s): \"TSH\" in the last 72 hours. No results for input(s): \"CHOL\", \"HDL\", \"LDLCALC\", \"TRIG\" in the last 72 hours.]    Lab Results   Component Value Date    TROPONINI <0.01 07/29/2018         Imaging     Telemetry:  NSR       Assessment & Plan       Afib w/RVR  Hypertension   HLD  -Patient presented with dizziness, palpitations and + orthostatic hypotension noted in his nephro OP office visit. Was in Afib w/RVR, HR up to 150s. Started on diltiazem, now in NSR  -On heparin, change to Eliquis on discharge    -Echo 9/22/23: EF 26%, grade 1 diastolic dysfunction   -Off diltiazem  -Continue toprol xl 25 mg BID, norvasc, lipitor   -TDH pending  -may need OP eval for IDA. -Patient wants to go home, he reports his dizziness and palpitations has resolved. He will have stress test outpatient and follow up with Dr Jose Claire in the office next week.  He was instructed to return to the ER if his palpations, chest

## 2023-09-22 NOTE — PROGRESS NOTES
Pharmacy Note    Sulma  was ordered azelastin nasal spray. Per the [address] [place] [address] Street, this medication is non-formulary and not stocked by pharmacy for the reason indicated below. The medication can be reordered at discharge.      Medications that lack necessity during an acute hospital stay:      -  nasal antihistamines      Thank you,  Charley Rhoades [facility]  9/22/2023, 8:31 AM

## 2023-09-22 NOTE — CARE COORDINATION
Case Management Assessment  Initial Evaluation    Date/Time of Evaluation: 9/22/2023 1:42 PM  Assessment Completed by: Linda Ching RN    If patient is discharged prior to next notation, then this note serves as note for discharge by case management. Patient Name: Jil Higgins                   YOB: 1950  Diagnosis: Atrial fibrillation with RVR ([address]) [I48.91]                   Date / Time: 9/21/2023  2:08 PM    Patient Admission Status: Inpatient   Readmission Risk (Low < 19, Mod (19-27), High > 27): Readmission Risk Score: 12.6    Current PCP: Mallory Hallman MD  PCP verified by CM? Yes    Chart Reviewed: Yes      History Provided by: Patient  Patient Orientation: Alert and Oriented    Patient Cognition: Alert    Hospitalization in the last 30 days (Readmission):  Yes    If yes, Readmission Assessment in CM Navigator will be completed.     Readmission Assessment  Number of Days since last admission?: 8-30 days  Previous Disposition: Home with Family  Who is being Interviewed: Patient  What was the patient's/caregiver's perception as to why they think they needed to return back to the hospital?: Other (Comment) (new onset sxs)  Did you visit your Primary Care Physician after you left the hospital, before you returned this time?: No  Why weren't you able to visit your PCP?: Did not have an appointment  Did you see a specialist, such as Cardiac, Pulmonary, Orthopedic Physician, etc. after you left the hospital?: Yes (nephrologist)  Who advised the patient to return to the hospital?: Self-referral  Does the patient report anything that got in the way of taking their medications?: No  In our efforts to provide the best possible care to you and others like you, can you think of anything that we could have done to help you after you left the hospital the first time, so that you might not have needed to return so soon?: Other (Comment) (new sxs)      Advance Directives:      Code Status: Full

## 2023-09-22 NOTE — PLAN OF CARE
Problem: Discharge Planning  Goal: Discharge to home or other facility with appropriate resources  Outcome: Progressing  Flowsheets (Taken 9/22/2023 1289)  Discharge to home or other facility with appropriate resources:   Identify barriers to discharge with patient and caregiver   Arrange for needed discharge resources and transportation as appropriate   Identify discharge learning needs (meds, wound care, etc)     Problem: Safety - Adult  Goal: Free from fall injury  Outcome: Progressing  Flowsheets (Taken 9/22/2023 0412)  Free From Fall Injury:   Instruct family/caregiver on patient safety   Based on caregiver fall risk screen, instruct family/caregiver to ask for assistance with transferring infant if caregiver noted to have fall risk factors

## 2023-09-22 NOTE — DISCHARGE SUMMARY
V2.0  Discharge Summary    Name:  Karina Adames /Age/Sex: 1950 (75 y.o. male)   Admit Date: 2023  Discharge Date: 23    MRN & CSN:  [id number] & [id number] Encounter Date and Time 23 1:20 PM EDT    Attending:  No att. providers found Discharging Provider: Yvan Hu MD       Hospital Course:     Brief HPI: Karina Adames is a 75 y.o. male with PMH of BPH (placed on brooks catheter recently), HTN, DM II who presented with dizziness past few days. Pt was at nephrology clinic and was noted to have orthostatic hypotension. He was found in new Afib with RVR . He was started on dilt drip and he converted to NSR quickly with HR in 60s. I increased his metoprolol xl to BID. He was started on heparin drip and was switched to Eliquis on discharge . He had elevated trop that is likely demand ischemia. Echo showed EF 60 with grade II DD. He was seen by cardiology with plan for outpt stress test.     He has hx of ascending aorta dilation 4 cm on [facility] 2018. Repeat Echo here showed 4.4 cm. He need to follow this up as outpt     Brief Problem Based Course:   New onset Afib with RVR  Elevated trop   Ascending aorta dilation, mildly worse      The patient expressed appropriate understanding of, and agreement with the discharge recommendations, medications, and plan.      Consults this admission:  IP CONSULT TO PHARMACY  IP CONSULT TO CARDIOLOGY    Discharge Diagnosis:   Atrial fibrillation with RVR ([address])  Elevated trop      Discharge Instruction:   Follow up appointments: PCP and cardiology  Primary care physician: Dejon Hook MD within 2 weeks  Diet: regular diet   Activity: activity as tolerated and ambulate in house  Disposition: Discharged to:   []Home, []C, []SNF, []Acute Rehab, []Hospice   Condition on discharge: Stable  Labs and Tests to be Followed up as an outpatient by PCP or Specialist:     Discharge Medications:        Medication List        START taking these medications

## 2023-09-25 ENCOUNTER — HOSPITAL ENCOUNTER (OUTPATIENT)
Dept: CARDIAC CATH/INVASIVE PROCEDURES | Age: 73
Discharge: HOME | End: 2023-09-25

## 2023-09-25 DIAGNOSIS — R07.9 CHEST PAIN, UNSPECIFIED TYPE: Primary | ICD-10-CM

## 2023-09-25 DIAGNOSIS — R42 DIZZINESS: ICD-10-CM

## 2023-09-25 DIAGNOSIS — I48.91 ATRIAL FIBRILLATION, UNSPECIFIED TYPE (HCC): ICD-10-CM

## 2023-09-25 NOTE — PROGRESS NOTES
Dr. Isom Dancer is ordering a lexiscan stress test to be done this week. Patient will be having this completed at [facility]. on Friday at 1pm with arrival of 1230pm.  Prep given to patient-no caffeine 24 hours prior to test time, NPO 4 hours prior to testing. Wear loose comfortable clothing. Patient's questions were answered, concerns were addressed.

## 2023-09-29 ENCOUNTER — HOSPITAL ENCOUNTER (OUTPATIENT)
Dept: NON INVASIVE DIAGNOSTICS | Age: 73
Discharge: HOME | End: 2023-09-29
Payer: MEDICARE

## 2023-09-29 DIAGNOSIS — R07.9 CHEST PAIN, UNSPECIFIED TYPE: ICD-10-CM

## 2023-09-29 DIAGNOSIS — R42 DIZZINESS: ICD-10-CM

## 2023-09-29 DIAGNOSIS — I48.91 ATRIAL FIBRILLATION, UNSPECIFIED TYPE (HCC): ICD-10-CM

## 2023-09-29 PROCEDURE — A9502 TC99M TETROFOSMIN: HCPCS | Performed by: INTERNAL MEDICINE

## 2023-09-29 PROCEDURE — 78452 HT MUSCLE IMAGE SPECT MULT: CPT

## 2023-09-29 PROCEDURE — 3430000000 HC RX DIAGNOSTIC RADIOPHARMACEUTICAL: Performed by: INTERNAL MEDICINE

## 2023-09-29 PROCEDURE — 93017 CV STRESS TEST TRACING ONLY: CPT

## 2023-09-29 PROCEDURE — 6360000002 HC RX W HCPCS: Performed by: INTERNAL MEDICINE

## 2023-09-29 RX ORDER — REGADENOSON 0.08 MG/ML
0.4 INJECTION, SOLUTION INTRAVENOUS
Status: COMPLETED | OUTPATIENT
Start: 2023-09-29 | End: 2023-09-29

## 2023-09-29 RX ADMIN — TETROFOSMIN 30 MILLICURIE: 1.38 INJECTION, POWDER, LYOPHILIZED, FOR SOLUTION INTRAVENOUS at 14:46

## 2023-09-29 RX ADMIN — REGADENOSON 0.4 MG: 0.08 INJECTION, SOLUTION INTRAVENOUS at 14:46

## 2023-09-29 RX ADMIN — TETROFOSMIN 10 MILLICURIE: 1.38 INJECTION, POWDER, LYOPHILIZED, FOR SOLUTION INTRAVENOUS at 12:46

## 2023-10-02 ENCOUNTER — OFFICE VISIT (OUTPATIENT)
Dept: CARDIOLOGY CLINIC | Age: 73
End: 2023-10-02
Payer: MEDICARE

## 2023-10-02 VITALS
HEART RATE: 89 BPM | BODY MASS INDEX: 27.49 KG/M2 | SYSTOLIC BLOOD PRESSURE: 138 MMHG | WEIGHT: 225.8 LBS | DIASTOLIC BLOOD PRESSURE: 64 MMHG

## 2023-10-02 DIAGNOSIS — I48.91 ATRIAL FIBRILLATION WITH RVR (HCC): ICD-10-CM

## 2023-10-02 DIAGNOSIS — I70.219 ATHEROSCLEROTIC PVD WITH INTERMITTENT CLAUDICATION (HCC): ICD-10-CM

## 2023-10-02 DIAGNOSIS — E78.5 HYPERLIPIDEMIA, UNSPECIFIED HYPERLIPIDEMIA TYPE: ICD-10-CM

## 2023-10-02 DIAGNOSIS — I10 PRIMARY HYPERTENSION: Primary | ICD-10-CM

## 2023-10-02 DIAGNOSIS — I63.9 ACUTE CVA (CEREBROVASCULAR ACCIDENT) (HCC): ICD-10-CM

## 2023-10-02 DIAGNOSIS — R94.39 ABNORMAL STRESS TEST: ICD-10-CM

## 2023-10-02 PROCEDURE — 99214 OFFICE O/P EST MOD 30 MIN: CPT | Performed by: INTERNAL MEDICINE

## 2023-10-02 PROCEDURE — 1036F TOBACCO NON-USER: CPT | Performed by: INTERNAL MEDICINE

## 2023-10-02 PROCEDURE — G8419 CALC BMI OUT NRM PARAM NOF/U: HCPCS | Performed by: INTERNAL MEDICINE

## 2023-10-02 PROCEDURE — 1123F ACP DISCUSS/DSCN MKR DOCD: CPT | Performed by: INTERNAL MEDICINE

## 2023-10-02 PROCEDURE — 3078F DIAST BP <80 MM HG: CPT | Performed by: INTERNAL MEDICINE

## 2023-10-02 PROCEDURE — G8427 DOCREV CUR MEDS BY ELIG CLIN: HCPCS | Performed by: INTERNAL MEDICINE

## 2023-10-02 PROCEDURE — G8484 FLU IMMUNIZE NO ADMIN: HCPCS | Performed by: INTERNAL MEDICINE

## 2023-10-02 PROCEDURE — 3017F COLORECTAL CA SCREEN DOC REV: CPT | Performed by: INTERNAL MEDICINE

## 2023-10-02 PROCEDURE — 1111F DSCHRG MED/CURRENT MED MERGE: CPT | Performed by: INTERNAL MEDICINE

## 2023-10-02 PROCEDURE — 3075F SYST BP GE 130 - 139MM HG: CPT | Performed by: INTERNAL MEDICINE

## 2023-10-02 ASSESSMENT — ENCOUNTER SYMPTOMS
EYES NEGATIVE: 1
RESPIRATORY NEGATIVE: 1
ALLERGIC/IMMUNOLOGIC NEGATIVE: 1
GASTROINTESTINAL NEGATIVE: 1

## 2023-10-02 NOTE — PROGRESS NOTES
Appearance: Normal appearance. HENT:      Head: Normocephalic and atraumatic. Nose: Nose normal.      Mouth/Throat:      Mouth: Mucous membranes are dry. Eyes:      Pupils: Pupils are equal, round, and reactive to light. Cardiovascular:      Rate and Rhythm: Normal rate and regular rhythm. Heart sounds: Murmur heard. No friction rub. No gallop. Pulmonary:      Effort: Pulmonary effort is normal.      Breath sounds: Normal breath sounds. Abdominal:      General: Abdomen is flat. Palpations: Abdomen is soft. Musculoskeletal:         General: No swelling or tenderness. Normal range of motion. Cervical back: Normal range of motion and neck supple. Skin:     General: Skin is warm and dry. Capillary Refill: Capillary refill takes 2 to 3 seconds. Neurological:      General: No focal deficit present. Mental Status: He is oriented to person, place, and time. Cranial Nerves: No cranial nerve deficit. Sensory: No sensory deficit. Motor: No weakness. Gait: Gait normal.   Psychiatric:         Mood and Affect: Mood normal.         Behavior: Behavior normal.         Thought Content:  Thought content normal.         Judgment: Judgment normal.           Labs:     Lab Results   Component Value Date    WBC 6.6 09/22/2023    HGB 11.5 (L) 09/22/2023    HCT 34.4 (L) 09/22/2023    MCV 86.3 09/22/2023     09/22/2023     Lab Results   Component Value Date     09/22/2023    K 3.8 09/22/2023     09/22/2023    CO2 28 09/22/2023    BUN 20 09/22/2023    CREATININE 1.0 09/22/2023    GLUCOSE 128 (H) 09/22/2023    CALCIUM 9.6 09/22/2023    PROT 6.8 08/29/2023    LABALBU 3.1 (L) 08/31/2023    BILITOT 0.7 07/31/2018    ALKPHOS 59 07/31/2018    AST 21 07/31/2018    ALT 23 07/31/2018    LABGLOM >60 09/22/2023    GFRAA >60 08/13/2018    AGRATIO 1.1 07/31/2018    GLOB 3.6 07/31/2018         Lab Results   Component Value Date    CHOL 139 07/24/2018    CHOL 184

## 2024-07-21 ENCOUNTER — APPOINTMENT (OUTPATIENT)
Dept: CT IMAGING | Age: 74
End: 2024-07-21
Payer: MEDICARE

## 2024-07-21 ENCOUNTER — HOSPITAL ENCOUNTER (EMERGENCY)
Age: 74
Discharge: ANOTHER HOSPITAL | End: 2024-07-21
Payer: MEDICARE

## 2024-07-21 VITALS
SYSTOLIC BLOOD PRESSURE: 160 MMHG | BODY MASS INDEX: 31.13 KG/M2 | HEIGHT: 76 IN | DIASTOLIC BLOOD PRESSURE: 60 MMHG | WEIGHT: 255.6 LBS | TEMPERATURE: 97.6 F | OXYGEN SATURATION: 95 % | RESPIRATION RATE: 18 BRPM | HEART RATE: 72 BPM

## 2024-07-21 DIAGNOSIS — S32.009A CLOSED FRACTURE OF TRANSVERSE PROCESS OF LUMBAR VERTEBRA, INITIAL ENCOUNTER (HCC): ICD-10-CM
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
## 2024-07-21 DIAGNOSIS — S22.42XA CLOSED FRACTURE OF MULTIPLE RIBS OF LEFT SIDE, INITIAL ENCOUNTER: ICD-10-CM

## 2024-07-21 LAB
ANION GAP SERPL CALCULATED.3IONS-SCNC: 13 MMOL/L (ref 3–16)
APTT BLD: 29.9 SEC (ref 22.1–36.4)
BASOPHILS # BLD: 0 K/UL (ref 0–0.2)
BASOPHILS NFR BLD: 0.5 %
BUN SERPL-MCNC: 22 MG/DL (ref 7–20)
CALCIUM SERPL-MCNC: 10.3 MG/DL (ref 8.3–10.6)
CHLORIDE SERPL-SCNC: 101 MMOL/L (ref 99–110)
CO2 SERPL-SCNC: 25 MMOL/L (ref 21–32)
CREAT SERPL-MCNC: 1.1 MG/DL (ref 0.8–1.3)
DEPRECATED RDW RBC AUTO: 14.4 % (ref 12.4–15.4)
EOSINOPHIL # BLD: 0.2 K/UL (ref 0–0.6)
EOSINOPHIL NFR BLD: 2.5 %
GFR SERPLBLD CREATININE-BSD FMLA CKD-EPI: 70 ML/MIN/{1.73_M2}
GLUCOSE SERPL-MCNC: 158 MG/DL (ref 70–99)
HCT VFR BLD AUTO: 41 % (ref 40.5–52.5)
HGB BLD-MCNC: 13.9 G/DL (ref 13.5–17.5)
INR PPP: 1.03 (ref 0.85–1.15)
LYMPHOCYTES # BLD: 0.9 K/UL (ref 1–5.1)
LYMPHOCYTES NFR BLD: 12 %
MCH RBC QN AUTO: 30.4 PG (ref 26–34)
MCHC RBC AUTO-ENTMCNC: 33.9 G/DL (ref 31–36)
MCV RBC AUTO: 89.6 FL (ref 80–100)
MONOCYTES # BLD: 0.6 K/UL (ref 0–1.3)
MONOCYTES NFR BLD: 8.9 %
NEUTROPHILS # BLD: 5.5 K/UL (ref 1.7–7.7)
NEUTROPHILS NFR BLD: 76.1 %
PLATELET # BLD AUTO: 199 K/UL (ref 135–450)
PMV BLD AUTO: 7.9 FL (ref 5–10.5)
POTASSIUM SERPL-SCNC: 3.9 MMOL/L (ref 3.5–5.1)
PROTHROMBIN TIME: 13.7 SEC (ref 11.9–14.9)
RBC # BLD AUTO: 4.58 M/UL (ref 4.2–5.9)
SODIUM SERPL-SCNC: 139 MMOL/L (ref 136–145)
WBC # BLD AUTO: 7.2 K/UL (ref 4–11)

## 2024-07-21 PROCEDURE — 96374 THER/PROPH/DIAG INJ IV PUSH: CPT

## 2024-07-21 PROCEDURE — 6370000000 HC RX 637 (ALT 250 FOR IP)

## 2024-07-21 PROCEDURE — 99285 EMERGENCY DEPT VISIT HI MDM: CPT

## 2024-07-21 PROCEDURE — 6360000002 HC RX W HCPCS

## 2024-07-21 PROCEDURE — 71260 CT THORAX DX C+: CPT

## 2024-07-21 PROCEDURE — 6360000004 HC RX CONTRAST MEDICATION

## 2024-07-21 PROCEDURE — 96376 TX/PRO/DX INJ SAME DRUG ADON: CPT

## 2024-07-21 PROCEDURE — 80048 BASIC METABOLIC PNL TOTAL CA: CPT

## 2024-07-21 PROCEDURE — 85025 COMPLETE CBC W/AUTO DIFF WBC: CPT

## 2024-07-21 PROCEDURE — 85730 THROMBOPLASTIN TIME PARTIAL: CPT

## 2024-07-21 PROCEDURE — 85610 PROTHROMBIN TIME: CPT

## 2024-07-21 RX ORDER — CLOPIDOGREL BISULFATE 75 MG/1
75 TABLET ORAL DAILY
COMMUNITY

## 2024-07-21 RX ORDER — METHOCARBAMOL 500 MG/1
750 TABLET, FILM COATED ORAL ONCE
Status: COMPLETED | OUTPATIENT
Start: 2024-07-21 | End: 2024-07-21

## 2024-07-21 RX ORDER — LIDOCAINE 4 G/G
1 PATCH TOPICAL DAILY
Status: DISCONTINUED | OUTPATIENT
Start: 2024-07-21 | End: 2024-07-21 | Stop reason: HOSPADM

## 2024-07-21 RX ORDER — HYDROMORPHONE HYDROCHLORIDE 1 MG/ML
0.5 INJECTION, SOLUTION INTRAMUSCULAR; INTRAVENOUS; SUBCUTANEOUS ONCE
Status: COMPLETED | OUTPATIENT
Start: 2024-07-21 | End: 2024-07-21

## 2024-07-21 RX ORDER — HYDROMORPHONE HYDROCHLORIDE 1 MG/ML
1 INJECTION, SOLUTION INTRAMUSCULAR; INTRAVENOUS; SUBCUTANEOUS ONCE
Status: COMPLETED | OUTPATIENT
Start: 2024-07-21 | End: 2024-07-21

## 2024-07-21 RX ADMIN — HYDROMORPHONE HYDROCHLORIDE 1 MG: 1 INJECTION, SOLUTION INTRAMUSCULAR; INTRAVENOUS; SUBCUTANEOUS at 15:06

## 2024-07-21 RX ADMIN — METHOCARBAMOL 750 MG: 500 TABLET ORAL at 18:53

## 2024-07-21 RX ADMIN — IOPAMIDOL 75 ML: 755 INJECTION, SOLUTION INTRAVENOUS at 15:50

## 2024-07-21 RX ADMIN — HYDROMORPHONE HYDROCHLORIDE 0.5 MG: 1 INJECTION, SOLUTION INTRAMUSCULAR; INTRAVENOUS; SUBCUTANEOUS at 18:53

## 2024-07-21 NOTE — ED PROVIDER NOTES
ED Attending Attestation Note     Date of evaluation: 7/21/2024    This patient was seen by the resident.  I have seen and examined the patient, agree with the workup, evaluation, management and diagnosis. The care plan has been discussed.  I have reviewed the ECG and concur with the resident's interpretation.  This patient does have a history of stroke with right-sided residual weakness.  He was attempting to walk on the couch when he fell onto his left side.  He adamantly denies hitting his head, any neck pain, headache, loss of consciousness.  He does take Eliquis.  My assessment reveals tenderness in the left flank region and left posterior lateral inferior chest wall.  There is no signs of hematoma.  There is no abdominal tenderness.  No cervical spine tenderness.  Show plan was to perform CT chest abdomen pelvis to rule out any intrathoracic or abdominal injury.  CT scan was concerning for lumbar spine fracture and 3 contiguous rib fractures.  Due to the significant mount of pain with rib fractures, age, comorbidities he will require hospitalization at a trauma center.  He was transferred to [facility].       Dionte Harrison MD  07/21/24 [id number]

## 2024-07-21 NOTE — ED NOTES
Patient transferred to  ED via CMT.  PIV will remain in arm.  Copy of AVS and EMTALA paperwork sent with patient.  Report given to UC charge KATHRIN Beck.       Jocelyn Streeter, KATHRIN  07/21/24 1816

## 2024-08-26 ENCOUNTER — HOSPITAL ENCOUNTER (OUTPATIENT)
Dept: PHYSICAL THERAPY | Age: 74
Setting detail: THERAPIES SERIES
Discharge: HOME | End: 2024-08-26
Payer: MEDICARE

## 2024-08-26 DIAGNOSIS — R07.81 RIB PAIN ON LEFT SIDE: ICD-10-CM

## 2024-08-26 DIAGNOSIS — R26.89 BALANCE PROBLEMS: ICD-10-CM

## 2024-08-26 DIAGNOSIS — G89.29 CHRONIC LEFT-SIDED LOW BACK PAIN WITHOUT SCIATICA: Primary | ICD-10-CM

## 2024-08-26 DIAGNOSIS — M54.50 CHRONIC LEFT-SIDED LOW BACK PAIN WITHOUT SCIATICA: Primary | ICD-10-CM

## 2024-08-26 DIAGNOSIS — I69.398 ABNORMALITY OF GAIT AS LATE EFFECT OF CEREBROVASCULAR ACCIDENT (CVA): ICD-10-CM

## 2024-08-26 DIAGNOSIS — R26.9 ABNORMALITY OF GAIT AS LATE EFFECT OF CEREBROVASCULAR ACCIDENT (CVA): ICD-10-CM

## 2024-08-26 PROCEDURE — 97110 THERAPEUTIC EXERCISES: CPT

## 2024-08-26 PROCEDURE — 97163 PT EVAL HIGH COMPLEX 45 MIN: CPT

## 2024-08-26 PROCEDURE — 97530 THERAPEUTIC ACTIVITIES: CPT

## 2024-08-26 NOTE — PLAN OF CARE
pain  [] Progressing: [] Met: [] Not Met: [] Adjusted    Therapist goals for Patient:   Short Term Goals: To be achieved in: 4 weeks   1.  Independent in HEP and progression per patient tolerance.   [] Progressing: [] Met: [] Not Met: [] Adjusted  2. Pt will perform 12 reps on 30 sec sit to stand for improved tolerance of functional transfers.   [] Progressing: [] Met: [] Not Met: [] Adjusted  3. Pt to perform sit to supine mod I using log rolling technique towards R.   [] Progressing: [] Met: [] Not Met: [] Adjusted  4. Pt will report L low back/rib pain <5/10 on average.   [] Progressing: [] Met: [] Not Met: [] Adjusted    Long Term Goals: To be achieved in: 6-8 weeks  1. Pt will improve ABC scale to 68% confidence.   [] Progressing: [] Met: [] Not Met: [] Adjusted  2. Pt to negotiate up/down 12 stairs with step to pattern: ascending with left, descending with right Modified Independent   [] Progressing: [] Met: [] Not Met: [] Adjusted  3. Pt to ambulate functional community distances of 150-250 ft or more with use of Single point cane on left Modified Independent  [] Progressing: [] Met: [] Not Met: [] Adjusted  4. Pt will score >=40/56 on Vincent to demonstrate reduced fall risk.   [] Progressing: [] Met: [] Not Met: [] Adjusted  5. Pt independent with HEP.  [] Progressing: [] Met: [] Not Met: [] Adjusted  6. Pt will improve walking tolerance as demonstrated by improved Modified Oswestry to >=24/50.   [] Progressing: [] Met: [] Not Met: [] Adjusted    Overall Progression Towards Functional goals/ Treatment Progress Update:  [] Patient is progressing as expected towards functional goals listed.    [] Progression is slowed due to complexities/Impairments listed.  [] Progression has been slowed due to co-morbidities.  [x] Plan just implemented, too soon (<30days) to assess goals progression   [] Goals require adjustment due to lack of progress  [] Patient is not progressing as expected and requires additional follow

## 2024-08-28 ENCOUNTER — HOSPITAL ENCOUNTER (OUTPATIENT)
Dept: PHYSICAL THERAPY | Age: 74
Setting detail: THERAPIES SERIES
Discharge: HOME | End: 2024-08-28
Payer: MEDICARE

## 2024-08-28 PROCEDURE — 97112 NEUROMUSCULAR REEDUCATION: CPT

## 2024-08-28 PROCEDURE — 97110 THERAPEUTIC EXERCISES: CPT

## 2024-08-28 NOTE — FLOWSHEET NOTE
[facility]- Outpatient Rehabilitation and Therapy [address], [place] [address] office: [phone] fax: [phone]           Physical Therapy: TREATMENT/PROGRESS NOTE   Patient: Sanket Mart (74 y.o. male)   Examination Date: 2024   :  1950 MRN: [id number]   Visit #:   Insurance Allowable Auth Needed   BMN []Yes    [x]No    Insurance: Payor: MEDICARE / Plan: MEDICARE PART A AND B / Product Type: *No Product type* /   Insurance ID: [id number] - (Medicare)  Secondary Insurance (if applicable): BCBS   Treatment Diagnosis:     ICD-10-CM    1. Chronic left-sided low back pain without sciatica  M54.50     G89.29       2. Rib pain on left side  R07.81       3. Abnormality of gait as late effect of cerebrovascular accident (CVA)  I69.398     R26.9       4. Balance problems  R26.89          Medical Diagnosis:  Multiple fractures of ribs, left side, initial encounter for closed fracture [S22.42XA]  Unspecified fracture of unspecified lumbar vertebra, initial encounter for closed fracture [S32.009A]   Referring Physician: Rosemary Jean-Baptiste PA  PCP: Juan Garcia MD       Plan of care signed (Y/N): N    Date of Patient follow up with Physician:      Plan of Care Report:     24  POC update due: (10 visits /OR AUTH LIMITS, whichever is less)  2024                                             Medical History:                                         Precautions/ Contra-indications:           Latex allergy:  YES  Pacemaker:    NO  Contraindications for Manipulation: recent fracture and blood thinners (relative)  Date of fractures : 24  Other: depression    Red Flags:  None    Suicide Screening:   The patient did not verbalize a primary behavioral concern, suicidal ideation, suicidal intent, or demonstrate suicidal behaviors.    Preferred Language for Healthcare:   [x] English       [] other:    SUBJECTIVE EXAMINATION     Patient stated complaint/comment: Pt

## 2024-09-03 ENCOUNTER — HOSPITAL ENCOUNTER (OUTPATIENT)
Dept: PHYSICAL THERAPY | Age: 74
Setting detail: THERAPIES SERIES
Discharge: HOME | End: 2024-09-03
Payer: MEDICARE

## 2024-09-03 PROCEDURE — 97110 THERAPEUTIC EXERCISES: CPT

## 2024-09-03 NOTE — FLOWSHEET NOTE
clinic incline.    Bed mobility  Sit to supine   Pt able to log roll, and tolerate fully supine without assist for R LE management.      Supine to sit   Through R log rolling, dizziness upon sitting up, resolved w/ rest.    Gait     [employer] TM - next              Modalities:    No modalities applied this session    Education/Home Exercise Program: Patient HEP program created electronically.  Refer to [employer] access code:    Access Code: [email or web address]  URL: [email or web address]  Date: 08/26/2024  Prepared by: Zuleika Meyrose  Exercises  - Supine Transversus Abdominis Bracing - Hands on Stomach  - 1 x daily - 7 x weekly - 3 sets - 10 reps  - Supine Heel Slide  - 1 x daily - 7 x weekly - 3 sets - 10 reps  - Supine Hip Abduction (Mirrored)  - 1 x daily - 7 x weekly - 3 sets - 10 reps  Patient Education  - Log Roll    Access Code: [email or web address]  URL: [email or web address]  Date: 08/28/2024  Prepared by: Zuleika Meyrose  Exercises  - Supine March  - 1 x daily - 5 x weekly - 3 sets - 10 reps - 3 hold  - Supine Lower Trunk Rotation  - 2 x daily - 7 x weekly - 1 sets - 10 reps - 10 hold    ASSESSMENT     Today's Assessment: Patient had good tolerance to today's session, reporting reduced pain and challenge with program completed. Able to progress  repetitions on core and R LE there ex. Continues to display deficits in tightness, stiffness, weakness, and decreased balance control which required ongoing skilled physical therapy and decision making.      Medical Necessity Documentation:  I certify that this patient meets the below criteria necessary for medical necessity for care and/or justification of therapy services:  The patient has a complexity identified by an ICD-10 code that has a direct and significant impact on the need for therapy.  (Significantly impacts the rate of recovery and is associated with a primary condition.)     Prognosis/Rehab Potential: Good    Patient requires continued skilled intervention: [x]

## 2024-09-10 ENCOUNTER — HOSPITAL ENCOUNTER (OUTPATIENT)
Dept: PHYSICAL THERAPY | Age: 74
Setting detail: THERAPIES SERIES
Discharge: HOME | End: 2024-09-10
Payer: MEDICARE

## 2024-09-10 PROCEDURE — 97110 THERAPEUTIC EXERCISES: CPT

## 2024-09-10 PROCEDURE — 97112 NEUROMUSCULAR REEDUCATION: CPT

## 2024-09-12 ENCOUNTER — HOSPITAL ENCOUNTER (OUTPATIENT)
Dept: PHYSICAL THERAPY | Age: 74
Setting detail: THERAPIES SERIES
Discharge: HOME | End: 2024-09-12
Payer: MEDICARE

## 2024-09-12 PROCEDURE — 97112 NEUROMUSCULAR REEDUCATION: CPT

## 2024-09-12 PROCEDURE — 97116 GAIT TRAINING THERAPY: CPT

## 2024-09-12 PROCEDURE — 97110 THERAPEUTIC EXERCISES: CPT

## 2024-09-16 ENCOUNTER — HOSPITAL ENCOUNTER (OUTPATIENT)
Dept: PHYSICAL THERAPY | Age: 74
Setting detail: THERAPIES SERIES
Discharge: HOME | End: 2024-09-16
Payer: MEDICARE

## 2024-09-16 PROCEDURE — 97110 THERAPEUTIC EXERCISES: CPT

## 2024-09-16 PROCEDURE — 97112 NEUROMUSCULAR REEDUCATION: CPT

## 2024-09-18 ENCOUNTER — APPOINTMENT (OUTPATIENT)
Dept: PHYSICAL THERAPY | Age: 74
End: 2024-09-18
Payer: MEDICARE

## 2024-09-23 ENCOUNTER — HOSPITAL ENCOUNTER (OUTPATIENT)
Dept: PHYSICAL THERAPY | Age: 74
Setting detail: THERAPIES SERIES
Discharge: HOME | End: 2024-09-23
Payer: MEDICARE

## 2024-09-23 PROCEDURE — 97110 THERAPEUTIC EXERCISES: CPT

## 2024-09-23 PROCEDURE — 97116 GAIT TRAINING THERAPY: CPT

## 2024-09-23 PROCEDURE — 97112 NEUROMUSCULAR REEDUCATION: CPT

## 2024-09-25 ENCOUNTER — HOSPITAL ENCOUNTER (OUTPATIENT)
Dept: PHYSICAL THERAPY | Age: 74
Setting detail: THERAPIES SERIES
Discharge: HOME | End: 2024-09-25
Payer: MEDICARE

## 2024-09-25 PROCEDURE — 97110 THERAPEUTIC EXERCISES: CPT

## 2024-09-25 PROCEDURE — 97530 THERAPEUTIC ACTIVITIES: CPT

## 2024-09-25 PROCEDURE — 97112 NEUROMUSCULAR REEDUCATION: CPT

## 2024-09-30 ENCOUNTER — HOSPITAL ENCOUNTER (OUTPATIENT)
Dept: PHYSICAL THERAPY | Age: 74
Setting detail: THERAPIES SERIES
Discharge: HOME | End: 2024-09-30
Payer: MEDICARE

## 2024-09-30 PROCEDURE — 97530 THERAPEUTIC ACTIVITIES: CPT

## 2024-09-30 PROCEDURE — 97110 THERAPEUTIC EXERCISES: CPT

## 2024-09-30 PROCEDURE — 97116 GAIT TRAINING THERAPY: CPT

## 2025-03-24 NOTE — PROGRESS NOTES
Hosp paged @ [id number]   Nutrition Assessment    Type and Reason for Visit: Reassess    Malnutrition Assessment:  · Malnutrition Status: No malnutrition    Nutrition Diagnosis:   · Problem: No nutrition diagnosis at this time    Nutrition Assessment:  · Subjective Assessment: Nutr follow up for po intake adequacy. Pt reports good appetite, but dislikes menu restrictions. RD review rationale for low sodium and CVA. Reviewed verbally label reading, sodium restrictions and foods high in salt to avoid. Pt accepted info. No additonal questions at this time. Nutrition Risk Level   Risk Level: Low    Nutrition Intervention  Food and/or Delivery: Continue current diet  Nutrition Education/Counseling/Coordination of Care:  Continued Inpatient Monitoring, Education Initiated    Patient assessed for nutrition risk. Deemed to be at low risk at this time. Will continue to follow patient.       Electronically signed by Emanuel Mccormick RD, DENISE on 8/7/18 at 2:27 PM    Contact Number: [phone] tender